# Patient Record
Sex: FEMALE | Race: WHITE | NOT HISPANIC OR LATINO | Employment: FULL TIME | URBAN - METROPOLITAN AREA
[De-identification: names, ages, dates, MRNs, and addresses within clinical notes are randomized per-mention and may not be internally consistent; named-entity substitution may affect disease eponyms.]

---

## 2017-03-07 ENCOUNTER — MYC MEDICAL ADVICE (OUTPATIENT)
Dept: FAMILY MEDICINE | Facility: CLINIC | Age: 52
End: 2017-03-07

## 2017-03-08 NOTE — TELEPHONE ENCOUNTER
Writer notes patient response    Closing encounter - no further actions needed at this time    Shawn Metzger RN

## 2017-03-08 NOTE — TELEPHONE ENCOUNTER
Please give her information on zipnosis (inexpensive about $30) .     I would also be willing to do e-visits if she desires.  Also about $30 usually I believe.     Joel Wegener,MD]

## 2017-03-08 NOTE — TELEPHONE ENCOUNTER
Pt not seen on clinic since 9/9/14. Has had many MyChart encounters since.   See note I sent her and if you have further comments please let us know.  I did tell her she stills needs to be seen once a year at a minimum in the clinic.    Jaylene Joseph, RN, BSN

## 2017-10-22 ENCOUNTER — HEALTH MAINTENANCE LETTER (OUTPATIENT)
Age: 52
End: 2017-10-22

## 2018-10-02 ENCOUNTER — TRANSFERRED RECORDS (OUTPATIENT)
Dept: HEALTH INFORMATION MANAGEMENT | Facility: CLINIC | Age: 53
End: 2018-10-02

## 2018-10-02 ENCOUNTER — SURGERY - HEALTHEAST (OUTPATIENT)
Dept: CARDIOLOGY | Facility: CLINIC | Age: 53
End: 2018-10-02

## 2018-10-02 ASSESSMENT — MIFFLIN-ST. JEOR
SCORE: 1423.25
SCORE: 1423.25

## 2018-10-03 ASSESSMENT — MIFFLIN-ST. JEOR
SCORE: 1422.33
SCORE: 1422.33

## 2018-10-04 ASSESSMENT — MIFFLIN-ST. JEOR
SCORE: 1434.12
SCORE: 1434.12

## 2018-10-05 ENCOUNTER — ANESTHESIA - HEALTHEAST (OUTPATIENT)
Dept: SURGERY | Facility: CLINIC | Age: 53
End: 2018-10-05

## 2018-10-06 ASSESSMENT — MIFFLIN-ST. JEOR
SCORE: 1444.1
SCORE: 1444.1

## 2018-10-07 ASSESSMENT — MIFFLIN-ST. JEOR
SCORE: 1423.69
SCORE: 1423.69

## 2018-10-08 ENCOUNTER — SURGERY - HEALTHEAST (OUTPATIENT)
Dept: SURGERY | Facility: CLINIC | Age: 53
End: 2018-10-08

## 2018-10-08 ASSESSMENT — MIFFLIN-ST. JEOR
SCORE: 1421.42
SCORE: 1421.42

## 2018-10-09 ENCOUNTER — TRANSFERRED RECORDS (OUTPATIENT)
Dept: HEALTH INFORMATION MANAGEMENT | Facility: CLINIC | Age: 53
End: 2018-10-09

## 2018-10-09 ASSESSMENT — MIFFLIN-ST. JEOR
SCORE: 1482.66
SCORE: 1482.66

## 2018-10-10 ASSESSMENT — MIFFLIN-ST. JEOR
SCORE: 1503.98
SCORE: 1503.98

## 2018-10-12 ASSESSMENT — MIFFLIN-ST. JEOR
SCORE: 1435.03
SCORE: 1435.03

## 2018-10-13 ASSESSMENT — MIFFLIN-ST. JEOR
SCORE: 1421.42
SCORE: 1421.42

## 2018-10-14 ASSESSMENT — MIFFLIN-ST. JEOR
SCORE: 1401.92
SCORE: 1401.92

## 2018-10-15 ASSESSMENT — MIFFLIN-ST. JEOR
SCORE: 1401.01
SCORE: 1401.01

## 2018-10-16 ENCOUNTER — COMMUNICATION - HEALTHEAST (OUTPATIENT)
Dept: ADMINISTRATIVE | Facility: CLINIC | Age: 53
End: 2018-10-16

## 2018-10-17 ASSESSMENT — MIFFLIN-ST. JEOR
SCORE: 1378.33
SCORE: 1378.33

## 2018-10-18 ENCOUNTER — HOSPITAL ENCOUNTER (INPATIENT)
Facility: CLINIC | Age: 53
LOS: 7 days | Discharge: HOME | End: 2018-10-25
Attending: PHYSICAL MEDICINE & REHABILITATION | Admitting: PHYSICAL MEDICINE & REHABILITATION
Payer: MEDICAID

## 2018-10-18 DIAGNOSIS — I10 HYPERTENSION GOAL BP (BLOOD PRESSURE) < 140/90: ICD-10-CM

## 2018-10-18 DIAGNOSIS — Z95.1 S/P CABG (CORONARY ARTERY BYPASS GRAFT): ICD-10-CM

## 2018-10-18 DIAGNOSIS — E78.5 HYPERLIPIDEMIA WITH TARGET LDL LESS THAN 100: ICD-10-CM

## 2018-10-18 DIAGNOSIS — K59.00 CONSTIPATION, UNSPECIFIED CONSTIPATION TYPE: ICD-10-CM

## 2018-10-18 DIAGNOSIS — E11.21 TYPE 2 DIABETES MELLITUS WITH DIABETIC NEPHROPATHY, WITHOUT LONG-TERM CURRENT USE OF INSULIN (H): ICD-10-CM

## 2018-10-18 DIAGNOSIS — I63.49 CEREBROVASCULAR ACCIDENT (CVA) DUE TO EMBOLISM OF OTHER CEREBRAL ARTERY (H): Primary | ICD-10-CM

## 2018-10-18 PROBLEM — I63.9 STROKE (H): Status: ACTIVE | Noted: 2018-10-18

## 2018-10-18 LAB
GLUCOSE BLDC GLUCOMTR-MCNC: 252 MG/DL (ref 70–99)
GLUCOSE BLDC GLUCOMTR-MCNC: 292 MG/DL (ref 70–99)

## 2018-10-18 PROCEDURE — 00000146 ZZHCL STATISTIC GLUCOSE BY METER IP

## 2018-10-18 PROCEDURE — 12800006 ZZH R&B REHAB

## 2018-10-18 PROCEDURE — 25000132 ZZH RX MED GY IP 250 OP 250 PS 637: Performed by: PHYSICAL MEDICINE & REHABILITATION

## 2018-10-18 RX ORDER — ACETAMINOPHEN 325 MG/1
325-650 TABLET ORAL EVERY 6 HOURS PRN
Status: DISCONTINUED | OUTPATIENT
Start: 2018-10-18 | End: 2018-10-25 | Stop reason: HOSPADM

## 2018-10-18 RX ORDER — FUROSEMIDE 20 MG
20 TABLET ORAL
Status: DISCONTINUED | OUTPATIENT
Start: 2018-10-18 | End: 2018-10-18

## 2018-10-18 RX ORDER — POTASSIUM CHLORIDE 750 MG/1
10 TABLET, EXTENDED RELEASE ORAL 2 TIMES DAILY
Status: DISCONTINUED | OUTPATIENT
Start: 2018-10-18 | End: 2018-10-18

## 2018-10-18 RX ORDER — POLYETHYLENE GLYCOL 3350 17 G/17G
17 POWDER, FOR SOLUTION ORAL DAILY
Status: DISCONTINUED | OUTPATIENT
Start: 2018-10-18 | End: 2018-10-18

## 2018-10-18 RX ORDER — LOSARTAN POTASSIUM 100 MG/1
100 TABLET ORAL DAILY
Status: DISCONTINUED | OUTPATIENT
Start: 2018-10-19 | End: 2018-10-25 | Stop reason: HOSPADM

## 2018-10-18 RX ORDER — HYDRALAZINE HYDROCHLORIDE 10 MG/1
10 TABLET, FILM COATED ORAL EVERY 6 HOURS PRN
Status: DISCONTINUED | OUTPATIENT
Start: 2018-10-18 | End: 2018-10-25 | Stop reason: HOSPADM

## 2018-10-18 RX ORDER — IPRATROPIUM BROMIDE AND ALBUTEROL SULFATE 2.5; .5 MG/3ML; MG/3ML
3 SOLUTION RESPIRATORY (INHALATION) EVERY 4 HOURS PRN
Status: DISCONTINUED | OUTPATIENT
Start: 2018-10-18 | End: 2018-10-25 | Stop reason: HOSPADM

## 2018-10-18 RX ORDER — FUROSEMIDE 20 MG
20 TABLET ORAL
Status: DISCONTINUED | OUTPATIENT
Start: 2018-10-18 | End: 2018-10-20

## 2018-10-18 RX ORDER — ASPIRIN 81 MG/1
81 TABLET, CHEWABLE ORAL DAILY
Status: DISCONTINUED | OUTPATIENT
Start: 2018-10-19 | End: 2018-10-25 | Stop reason: HOSPADM

## 2018-10-18 RX ORDER — POLYETHYLENE GLYCOL 3350 17 G/17G
17 POWDER, FOR SOLUTION ORAL DAILY PRN
Status: DISCONTINUED | OUTPATIENT
Start: 2018-10-18 | End: 2018-10-25 | Stop reason: HOSPADM

## 2018-10-18 RX ORDER — ONDANSETRON 4 MG/1
4 TABLET, FILM COATED ORAL EVERY 6 HOURS PRN
Status: DISCONTINUED | OUTPATIENT
Start: 2018-10-18 | End: 2018-10-25 | Stop reason: HOSPADM

## 2018-10-18 RX ORDER — BISACODYL 10 MG
10 SUPPOSITORY, RECTAL RECTAL DAILY PRN
Status: DISCONTINUED | OUTPATIENT
Start: 2018-10-18 | End: 2018-10-25 | Stop reason: HOSPADM

## 2018-10-18 RX ORDER — DOCUSATE SODIUM 100 MG/1
100 CAPSULE, LIQUID FILLED ORAL 2 TIMES DAILY PRN
Status: DISCONTINUED | OUTPATIENT
Start: 2018-10-18 | End: 2018-10-25 | Stop reason: HOSPADM

## 2018-10-18 RX ORDER — WARFARIN SODIUM 3 MG/1
6 TABLET ORAL
Status: COMPLETED | OUTPATIENT
Start: 2018-10-18 | End: 2018-10-18

## 2018-10-18 RX ORDER — POTASSIUM CHLORIDE 750 MG/1
10 TABLET, EXTENDED RELEASE ORAL 2 TIMES DAILY
Status: DISCONTINUED | OUTPATIENT
Start: 2018-10-18 | End: 2018-10-20

## 2018-10-18 RX ORDER — ATORVASTATIN CALCIUM 80 MG/1
80 TABLET, FILM COATED ORAL AT BEDTIME
Status: DISCONTINUED | OUTPATIENT
Start: 2018-10-18 | End: 2018-10-25 | Stop reason: HOSPADM

## 2018-10-18 RX ORDER — SENNOSIDES 8.6 MG
2 TABLET ORAL 2 TIMES DAILY PRN
Status: DISCONTINUED | OUTPATIENT
Start: 2018-10-18 | End: 2018-10-25 | Stop reason: HOSPADM

## 2018-10-18 RX ORDER — AMLODIPINE BESYLATE 10 MG/1
10 TABLET ORAL DAILY
Status: DISCONTINUED | OUTPATIENT
Start: 2018-10-19 | End: 2018-10-25 | Stop reason: HOSPADM

## 2018-10-18 RX ADMIN — POTASSIUM CHLORIDE 10 MEQ: 750 TABLET, EXTENDED RELEASE ORAL at 21:44

## 2018-10-18 RX ADMIN — WARFARIN SODIUM 6 MG: 3 TABLET ORAL at 18:07

## 2018-10-18 RX ADMIN — FUROSEMIDE 20 MG: 20 TABLET ORAL at 16:37

## 2018-10-18 RX ADMIN — ATORVASTATIN CALCIUM 80 MG: 80 TABLET, FILM COATED ORAL at 21:44

## 2018-10-18 RX ADMIN — METOPROLOL TARTRATE 125 MG: 50 TABLET, FILM COATED ORAL at 21:44

## 2018-10-18 ASSESSMENT — MIFFLIN-ST. JEOR
SCORE: 1370.62
SCORE: 1370.62

## 2018-10-18 NOTE — IP AVS SNAPSHOT
[facility]    [address] [place] [id number]    Phone:  [phone]                                       After Visit Summary   10/18/2018    Christina Sanchez    MRN: [id number]           After Visit Summary Signature Page     I have received my discharge instructions, and my questions have been answered. I have discussed any challenges I see with this plan with the nurse or doctor.    ..........................................................................................................................................  Patient/Patient Representative Signature      ..........................................................................................................................................  Patient Representative Print Name and Relationship to Patient    ..................................................               ................................................  Date                                   Time    ..........................................................................................................................................  Reviewed by Signature/Title    ...................................................              ..............................................  Date                                               Time          22EPIC Rev 08/18

## 2018-10-18 NOTE — H&P
"    [facility]   Acute Rehabilitation Unit  Admission History and Physical    chief complaint   \"Not myself but getting better\"    REHAB DIAGNOSIS  Bilateral embolic strokes    History of Present illness  Christina Sanchez is a 53 year old right hand dominant woman with a PMH including but not limited to HTN, uncontrolled diabetes (HbA1c 9.1), CAD, CHF, bilateral foot drop (pt states due to Atenolol, prior notes state could be due to medication side effect vs. Viral infection sequelae), and medication non-compliance who presented to [facility] on 10/1/18 for shortness of breath, chest pain, productive cough, and nausea.  She was diagnosed with a CHF exacerbation and hypertensive emergency, as BP was 221/95.  Treatment was initiated, and was also started on Zosyn for bacteremia and UTI.  Troponins were then found to be elevated and uptrending with subsequent EKG changes, and she was then transferred to [facility] for further evaluation.  At [facility], a LHC was performed which showed severe 2 vessel disease and she subsequently underwent a CABG x3 on 10/8 by Dr. Whitney.  Intra-operatively she required pressor support and transfusion of 3 units PRBCs.  Post-operative developed SILVERIO and hyponatremia for which nephrology was consulted and felt was due to diabetic nephropathy and fluid overload, and responded well to diuretics.  On POD 5 she complained of left arm numbness and other non-specific neurological symptoms, and an MRI showed multiple bilateral cerebral embolic infarctions.  She was started on ASA and Coumadin for this, with Plavix discontinued.  Workup included a CTA of the head and neck which showed occlusion of the left PCA and 50-70% stenosis of the proximal R ICA, and an echo showed an EF of 65-70% without vegetations.  Other notable events include surgical consultation for a large umbilical hernia, which will be re-evaluated in 1-2 months.  Of note, " the patient is very particular about diabetes medications including refusal of insulin and Metformin due to prior adverse effects, and also blames her current deficits on insulin she received during her hospitalization.  She says she is very brittle, and sensitive to medications in general.  She is currently on Januvia only.  She also complained of L arm numbness, and an MRI C-spine showed moderate to severe spinal canal and left foraminal narrowing at C5-6.  Outpatient EMG was recommended.    Currently she feels like she is at her baseline cognitive status since insulin was discontinued.  She denies pain or trouble swallowing.  Has baseline decreased vision and was worse a few days ago, but now back to baseline.  No problems with bowel or bladder and appetite is good.  Per nursing staff has expressed feelings of depression and would like to see health psychology.      FUNCTIONAL HISTORY  Current Functional Status:  Transfers  Bed Mobility: Min A, Assist of 1  Bed to Chair: CGA, Assist of 1  Chair to Stand: CGA, Assist of 1  Transfer Comments: Needs reminders to follow sternal precautions    Walking/Marching  Distance: 200 ft  Met Level: 3  Peak HR: 90  Peak BP: 148/70  Ambulation Status: CGA, Assist of 1  Device: Walker  Comment: bilateral foot drop    Prior Functional Status:  Ambulates with a Rolling walker.  Has bilateral AFOs, but does not use them.  Independent with all ADLs and most IADLs, but does not drive due to her foot drop.     PAST Medical History   Reviewed and updated in Epic.  Past Medical History:   Diagnosis Date     Idiopathic progressive polyneuropathy 8/14/2014       Surgical History  Reviewed and updated in Epic.  No past surgical history on file.    SOCIAL HISTORY  Reviewed and updated in Epic.  Marital Status:   Living situation: Lives in a house with 2 KJ.  All needs met on the main level.  Has a basement, but she does not go down there.  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     Social History Main Topics     Smoking status: Never Smoker     Smokeless tobacco: Never Used     Alcohol use No     Drug use: No     Sexual activity: Not on file     Other Topics Concern     Parent/Sibling W/ Cabg, Mi Or Angioplasty Before 65f 55m? Yes     Social History Narrative       FAMILY HISTORY  Reviewed and updated in Epic.  Family History   Problem Relation Age of Onset     Neurologic Disorder No family hx of            Medications  Scheduled meds  Prescriptions Prior to Admission   Medication Sig Dispense Refill Last Dose     ACETAMINOPHEN PO Take 650 mg by mouth every 6 hours as needed for pain   Not Taking     azithromycin (ZITHROMAX) 250 MG tablet Two tablets first day, then one tablet daily for four days. 6 tablet 0      Blood Glucose Monitoring Suppl (BLOOD GLUCOSE METER) KIT 1 kit 6 times daily Meter covered by insurance 1 kit 1 Not Taking     glucagon 1 MG injection Inject 1 mg into the muscle once for 1 dose 1 mg 0      Glucose Blood (BLOOD GLUCOSE TEST STRIPS) STRP Strips that go with meter covered by insurance test 6-8 times dailiy 200 strip 11 Not Taking     LANCETS ULTRA THIN 30G MISC Lancets that go with meter covered by insurance test 6-8 times daily 200 each 3 Not Taking     ORDER FOR DME Equipment being ordered: Walker with four wheels, brakes and seat with basket. 1 each 0        ALLERGIES     Allergies   Allergen Reactions     Shrimp Anaphylaxis     Atenolol Other (See Comments)     Loss of muscle and ability to walk     Dust Mites      Mold      Other [Seasonal Allergies]      Pollens and Flowers     Perfume      Peanuts [Nuts] GI Disturbance         Review of Systems  A 10 point ROS was performed and negative unless otherwise noted in  "HPI.     Constitutional: Negative for fevers, chills  Eyes: +Decreased vision  Ears, Nose, Throat: Negative  Cardiovascular: +dyspnea, CAD, s/p CABG  Respiratory: +BERMUDEZ  Gastrointestinal: Negative for constipation or diarrhea  Genitourinary: Negative for urinary frequency or incontinence  Musculoskeletal: +bilateral foot drop  Neurologic: +embolic strokes  Psychiatric: Depressed mood      Physical Exam  VITAL SIGNS:  /63 (BP Location: Left arm)  Pulse 70  Temp 97.5  F (36.4  C) (Oral)  Resp 14  Ht 1.575 m (5' 2\")  Wt 82.6 kg (182 lb 3.2 oz)  SpO2 96%  BMI 33.32 kg/m2  BMI:  Estimated body mass index is 28.35 kg/(m^2) as calculated from the following:    Height as of 8/21/14: 1.575 m (5' 2\").    Weight as of 9/9/14: 70.3 kg (155 lb).     General: NAD  HEENT: NC/AT  Pulmonary: Non-labored breathing, CTA b/l, no w/r/r  Cardiovascular: RRR, S1+S2, no m/r/g  Abdominal: Soft, NT/ND, BS+  Lower Extremities: No LE edema or calf tenderness b/l  MSK/neuro:   Mental Status:  alert and oriented x3   Cranial Nerves: grossly normal   1. 2nd CN: Pupils equal, round, reactive to light and accomodation. and visual fields intact to confrontation.   2. 3rd,4th,6th CN:  EOMI, appropriate pupillary responses  3. 5th CN: facial sensation intact   4. 7th CN: face symmetrical   5. 8th CN: functional hearing bilaterally  6. 9th, 10th CN: palate elevates symmetrically   7. 11th CN: sternocleidomastoids and trapezii strong   8. 12th CN: tongue midline and without fasciculations    Sensory: Normal to light touch in bilateral upper and lower extremities except the ulnar aspect of left hand and forearm which is decreased.   Strength:   Shoulder abd  EF  WE  EE    Finger abd  R NT 5 5 5 5 5  L NT 5 5 5 5 4     HF  KE  DF  EHL  PF   R  5 5 1 1 5  L  5  5 1 1 5     Reflexes:    Biceps BR Triceps Patella Achilles  R 2+ 2+ 2+ 2+ 0  L 2+ 2+ 2+ 2+ 0     Avalos's test: negative bilaterally    Coordination: No dysmetria on finger to nose " b/l     Cognition:   Orientation: AAOx3  President: Correctly named current and past president  WORLD <--> DLROW - Correctly spelled without difficulty  Memory: 1/3 with and without cues    Speech: Fluent  Comprehension: In tact  Repetition: In tact  -->: No aphasia    No evidence of neglect with finger extinguishing      Labs  BMP (10/17/18): Na 136, K 4, Cl 97, CO2 28, glu 230, BUN 22, Cr 1.00, Ca 8.6  Mg 2.0  CBC (10/17): Hgb 8.9, plts 342  INR (10/17): 1.19  Lipid panel (10/2/18): , Chol 197, , HDL 31     Imaging  CTA Head/Neck (10/15/18)  HEAD CT:  1.  Transcortical hypoattenuation in the right parietotemporal and left occipital temporal lobes as well as small foci of hypoattenuation in the bilateral frontoparietal cerebral white matter, which corresponded the acute infarction seen on the recent   MRI. No hemorrhagic transformation. No significant mass effect. No interval new acute large artery territory infarction. The multi-territorial distribution of infarction is suggestive of a central embolic source    HEAD CTA:   1.  Abrupt occlusion of the left V3 segment posterior cerebral artery.  2.  Mild narrowing of the bilateral supraclinoid internal carotid arteries and left cavernous internal carotid artery, likely due to atherosclerosis.    NECK CTA:  1.  Atherosclerosis causes approximately 50-70% stenosis in the proximal right internal carotid artery and less than 40% stenosis in the proximal left internal carotid artery    2.  Postoperative appearance prior CABG. Small left and trace right pleural effusions.    MRI C-Spine (10/14/18)  CONCLUSION:  1.  Moderate to severe spinal canal narrowing with flattening of the ventral cord and narrowing of the left lateral recess at C5-C6. No convincing associated cord edema.  2.  Mild spinal canal narrowing at C4-C5 and C6-C7.  3.  Moderate to severe left and mild to moderate right neuroforaminal narrowing at C5-C6.  4.  Moderate right and mild left  neuroforaminal narrowing at C4-C5.    MRI Brain (10/14/18)  CONCLUSION:  1.  There are several areas of acute to subacute ischemia involving both cerebral hemispheres as detailed above. The largest areas involve the right posterior temporal lobe and right occipital lobe, and the posteromedial left temporal lobe and occipital   lobe. Other areas of acute to subacute ischemia involve the medial left occipital lobe, and the bilateral frontoparietal corona radiata as well as couple of punctate foci of cortical acute to subacute ischemia in the posterior left frontal lobe and left   parietal lobe.  2.  Given the multiple vascular distributions, embolic phenomena is suspected.   3.  There are several small areas of patchy enhancement intracranially as detailed above. These are nonspecific but are favored to represent subacute ischemia. Recommend follow-up MRI in 6-8 weeks to document expected evolution.    IMPRESSION  Christina Sanchez is a 53 year old RHD womanwith a PMH including but not limited to HTN, uncontrolled diabetes (HbA1c 9.1), CAD, CHF, bilateral foot drop, and medication non-compliance who presented to [facility] on 10/1/18 for shortness of breath, chest pain, productive cough, and nausea, and found to be in CHF exacerbation and hypertensive emergency.  Hospital course was complicated by UTI and bacteremia, and found NSTEMI requiring transfer to [facility] for further treatment and workup.  Found to have 2 vessel disease, s/p CABG which was complicated by embolic CVA.  Now on Aspirin and coumadin.       Impairment group code: 01.4 No Paresis, bilateral hemispheric strokes following CABG x3    PLAN  1)Rehabiliation  -The patient has impairments in balance, sensation, coordination, and endurance, which are leading to activity limitations in ambulation, mobility, and ADLs.  She will be admitted into a comprehensive, interdisciplinary, inpatient rehabilitation program including  PT and OT.  Will ask speech  pathology to also evaluate the patient given stroke and memory deficits seen on exam.  She will receive 60 minutes of each on a daily basis.  The patient requires close supervision by a physiatrist for management and monitoring of active and chronic medical co-morbidities, and to ensure the patient's ability to fully participate and benefit from the rigorous program.  Additionally, the patient requires specialized rehabilitation nursing for monitoring of vital signs, medication administration, patient training and education, monitoring of bowel and bladder, and wound care.  The assistance of the social work team is also needed to collaborate and achieve the identified discharge goals.   A comprehensive individualized Care Plan will be formulated by the attending Physiatrist after initial evaluations by each Rehabilitation Team member at the initial team conference within four days of admission and updated/modified at subsequent team conferences.   This level of care and multidisciplinary approach is medically necessary and only available at the inpatient rehabilitation facility level of care.  The patient is willing to participate in 3 hours of therapy per day and has the potential for improvement.       2)Neurology  -Bilateral multiple hemispheric CVAs due to embolism in the setting of CABG   -MRI in 6-8 weeks to follow up evolution of strokes   -Follow up with neurology after discharge   -ASA 81 mg and Coumadin (goal 2-3) for secondary stroke prevention.  In addition will provide ongoing education regarding risk factor modification including controlling HTN and DM, medication compliance, lifestyle and diet changes, exercise, and physician follow up  -L arm numbness   -EMG as an outpatient.  MRI shows stenosis at the C5-6 level, however her symptoms are in the ulnar or C8/T1 distribution    3)Cardiology  -CAD s/p NSTEMI and CABG x3   -ASA 81 mg daily   -Sternal precautions 3 months, no lifting >10 lbs for 6-8  weeks   -May wash incision with soap   -Follow up with CV surgery (Olga Lidia Mcclendon NP, appointment on 11?/3)  -HTN/CHF   -Norvasc 10 mg daily, Cozaar 100 mg daily, Metoprolol 125 mg BID   -Lasix 20 mg BID with potassium supplementation x4 days.  Will monitor daily weights and re-evaluate need at that time.   -Hydralazine PRN for SBP >160 mmHg  -HLD: Lipitor 80 mg daily  -Follow up with cardiology made on December 6 with Dr. Cantrell    4)Pulm  -Acute respiratory failure post op due to volume overload, now off supplemental oxygen  -Chest tubes removed 10/5  -Duonebs q4h PRN  -Encourage incentive spirometry    5)FENGI  -Diet: Diabetic/Cardiac, regular consistency solids and thin liquids  -Monitor sodium and potassium levels  -Bowel meds PRN  -Zofran PRN  -Prilosec daily for GI prophylaxis and GERD    6)  -PVRs x3, IC if >250 ml  -Prior UTI and bacteremia, now completed treatment  -Prior acute on chronic kidney injury, now resolved.  Monitor kidney function.    7)DVT prophylaxis  -Coumadin, pharmacy to assist with INR monitoring (goal 2-3) and dosing    8)Pain  -Has not complained of pain, will order Tylenol PRN    9)Endocrine  -Uncontrolled DM  -Patient started on Januvia 50 mg daily, however has refused insulin and Metformin and insists this is the etiology of her symptoms.  Check glucose levels qAC and at bedtime, and if continues to be elevated will need Endocrine assistance for medication optimization.  Per report, had previously followed with an endocrinologist but has not seen one in several years    10)ID  -Prior bacteremia and UTI, now completed treatment  -Monitor for signs of infection    11)Psych  -Has expressed depression and difficulty coping.  Will ask health psychology to evaluate.    12)Heme  -Acute blood loss anemia post-operatively, Hgb 8.9 on last check.  Overall stable.  -No evidence of active bleeding, monitor peripherally    13)MSK  -Umbilical hernia: Needs follow up with general surgery (  [facility]) for repair in 1-2 months    14)Dispo:  -Anticipate discharge home  -Goals: Modified independent for ambulation with walker, and Mod I for all ADLs  -Rehab Prognosis: Good  -ELOS: 7 days    Code status: Full (discussed with patient)    Shan Merlos MD  Department of Rehabilitation Medicine  Pager: [phone]    Time Spent on this Encounter   I, Shan Merlos, spent a total of 75 minutes bedside and on the inpatient unit today managing the care of Christina Sanchez.  Over 50% of my time on the unit was spent counseling the patient and /or coordinating care. See note for details.

## 2018-10-18 NOTE — PHARMACY-ANTICOAGULATION SERVICE
Clinical Pharmacy - Warfarin Dosing Consult     Pharmacy has been consulted to manage this patient s warfarin therapy.  Indication: Other - specify in comments (CVA, s/p CABG)  Therapy Goal: INR 2-3  Warfarin Prior to Admission: Yes  Warfarin PTA Regimen: 10/15 5 mg, 10/16 5 mg, 10/17 6 mg while in [facility]  Significant drug interactions: Aspirin, atrovastatin    INR on 10/18 =1.42 per [facility]    Recommend warfarin 6 mg today.  Pharmacy will monitor Christina Sanchez daily and order warfarin doses to achieve specified goal.      Please contact pharmacy as soon as possible if the warfarin needs to be held for a procedure or if the warfarin goals change.

## 2018-10-18 NOTE — H&P
Post Admission Physician Evaluation:    I have compared Christina Sanchez's condition on admission to acute rehabilitation to that outlined in the preadmission screen. History and physical exam performed by me.  No significant differences are identified and the patient remains appropriate for an inpatient rehabilitation facility level of care to manage medical issues and address functional impairments due to bilateral embolic CVAs in the setting of CAD s/p CABG x3.    Comorbid medical conditions being managed: HTN, anticoagulation, HLD, diabetes mellitus, HLD, GERD, CHF/volume overload, hypokalemia, hyponatremia, SILVERIO, CKD    Current Functional Status:  Transfers  Bed Mobility: Min A, Assist of 1  Bed to Chair: CGA, Assist of 1  Chair to Stand: CGA, Assist of 1  Transfer Comments: Needs reminders to follow sternal precautions    Walking/Marching  Distance: 200 ft  Met Level: 3  Peak HR: 90  Peak BP: 148/70  Ambulation Status: CGA, Assist of 1  Device: Walker  Comment: bilateral foot drop     Prior Functional Status:  Ambulates with a Rolling walker.  Has bilateral AFOs, but does not use them.  Independent with all ADLs and most IADLs, but does not drive due to her foot drop.        Anticipated rehabilitation course: Anticipate will be able to discharge home with family.  Goals for modified independent for ambulation and all ADLs using a rolling walker.      Will benefit from intensive rehabilitation includin minutes each of PT, OT and SLP  Rehabilitation nursing  Close management by physiatry    Prognosis: Good    Estimated length of stay: 7 days    Shan Merlos MD  Department of Rehabilitation Medicine  Pager: [phone]

## 2018-10-18 NOTE — PROGRESS NOTES
Patient arrived to unit at approx 13:00 in private vehicle with significant other.  Writer and 2nd staff assisted patient out of back seat using gait belt and sternal precautions.  Patient sat in wheelchair and was wheeled onto unit into her room.  Patient denies pain.  Is continent of bowel and bladder per her report.  Declined flu and pneumonia vaccines.  Patient ordered on own lunch and ate 100%.  Will self select and order her own meals.  Patient has own rolling walker from home in room.  Was educated on importance of calling for help for any transfers or ambulation. Was able to use call light.  Patient was provided a chair in her room and she is sitting up in chair at this time visiting with significant other.  Patient agreed with all allergies listed and added Atenolol and writer updated in Epic.    Patient verbalized to writer her father passed a couple months ago and she also lost her job and was tearful while talking with writer about it.  MD notified.

## 2018-10-18 NOTE — PROGRESS NOTES
"   10/18/18 1500   Values Beliefs and Spiritual Care   C: Community: In support of your spiritual health, is there someone we may contact for you? (identify all that apply) (SHS/10-18)   Visit Information   Visit Made By Staff    Type of Visit Initial   Visited Patient;Family   Interventions   Plan of Care Review   With patient/family/proxy   Basic Spiritual Interventions    introduction/orientation to Spiritual Health Services;Assessment of spiritual needs/resources;Reflective conversation;Life Review   Advanced Assessments/Interventions   Presenting Concerns/Issues Spiritual/Mormonism/emotional support;Grief and adjustment issues;Challenged coping;Stress/self-care   SPIRITUAL HEALTH SERVICES   Spiritual Assessment Progress Note   [facility] ([facility] 5R   REFERRAL SOURCE: New on unit.   On this visit, introduced SH to Christina and her long time partner, Av.     EXPERIENCE OF ILLNESS/HOSPITALIZATION: Christina explained that she'd had a heart condition for a long time that she ignored. Then she had a heart attack, heart surgery and during the surgery she had a couple of small strokes.   MEANING-MAKING: Christina named as her primary spiritual pain right now is the recent loss of her father, who fell, hurt himself badly and  after 31 days in ICU at [facility].  Also, Av's dad is declining badly just now.  Christina became teary as she named these things.   SPIRITUALITY/VALUES/Presybeterian: Christina said that she is Mormon, but no Congregation. Av said that he is Zoroastrianism.  COPING/SPIRITUAL PRACTICES: n/d  Dr. Merlos came to welcome pt and we ended our visit.   SUPPORT SYSTEMS: Christina and Av have been together for fifteen years. \"We haven't gotten around to getting \", said Av. Christina said, \"He's the love of my life.\" Christina lives with Av, her mom, her daughter (32) and her granddtr (11)/  PLAN: Will see Christina next week on ARU.    Rev. Joshua-O Beatris-Cristina  Staff   Spiritual Health Services  Pager: " [phone]  Office: [phone]  * [facility] remains available 24/7 for emergent requests/referrals, either by having the switchboard page the on-call  or by entering an ASAP/STAT consult in Epic (this will also page the on-call ).*

## 2018-10-19 LAB
ANION GAP SERPL CALCULATED.3IONS-SCNC: 8 MMOL/L (ref 3–14)
BASOPHILS # BLD AUTO: 0.1 10E9/L (ref 0–0.2)
BASOPHILS NFR BLD AUTO: 0.9 %
BUN SERPL-MCNC: 28 MG/DL (ref 7–30)
CALCIUM SERPL-MCNC: 8.5 MG/DL (ref 8.5–10.1)
CHLORIDE SERPL-SCNC: 99 MMOL/L (ref 94–109)
CO2 SERPL-SCNC: 28 MMOL/L (ref 20–32)
CREAT SERPL-MCNC: 1.07 MG/DL (ref 0.52–1.04)
DIFFERENTIAL METHOD BLD: ABNORMAL
EOSINOPHIL # BLD AUTO: 0.7 10E9/L (ref 0–0.7)
EOSINOPHIL NFR BLD AUTO: 9.1 %
ERYTHROCYTE [DISTWIDTH] IN BLOOD BY AUTOMATED COUNT: 15.4 % (ref 10–15)
GFR SERPL CREATININE-BSD FRML MDRD: 54 ML/MIN/1.7M2
GLUCOSE BLDC GLUCOMTR-MCNC: 225 MG/DL (ref 70–99)
GLUCOSE BLDC GLUCOMTR-MCNC: 232 MG/DL (ref 70–99)
GLUCOSE BLDC GLUCOMTR-MCNC: 239 MG/DL (ref 70–99)
GLUCOSE SERPL-MCNC: 263 MG/DL (ref 70–99)
HCT VFR BLD AUTO: 30.4 % (ref 35–47)
HGB BLD-MCNC: 9.6 G/DL (ref 11.7–15.7)
IMM GRANULOCYTES # BLD: 0 10E9/L (ref 0–0.4)
IMM GRANULOCYTES NFR BLD: 0.4 %
INR PPP: 1.63 (ref 0.86–1.14)
LYMPHOCYTES # BLD AUTO: 2 10E9/L (ref 0.8–5.3)
LYMPHOCYTES NFR BLD AUTO: 24.3 %
MCH RBC QN AUTO: 29.4 PG (ref 26.5–33)
MCHC RBC AUTO-ENTMCNC: 31.6 G/DL (ref 31.5–36.5)
MCV RBC AUTO: 93 FL (ref 78–100)
MONOCYTES # BLD AUTO: 0.4 10E9/L (ref 0–1.3)
MONOCYTES NFR BLD AUTO: 5.3 %
NEUTROPHILS # BLD AUTO: 4.9 10E9/L (ref 1.6–8.3)
NEUTROPHILS NFR BLD AUTO: 60 %
NRBC # BLD AUTO: 0 10*3/UL
NRBC BLD AUTO-RTO: 0 /100
PLATELET # BLD AUTO: 333 10E9/L (ref 150–450)
POTASSIUM SERPL-SCNC: 4.1 MMOL/L (ref 3.4–5.3)
RBC # BLD AUTO: 3.27 10E12/L (ref 3.8–5.2)
SODIUM SERPL-SCNC: 135 MMOL/L (ref 133–144)
WBC # BLD AUTO: 8.1 10E9/L (ref 4–11)

## 2018-10-19 PROCEDURE — 97167 OT EVAL HIGH COMPLEX 60 MIN: CPT | Mod: GO | Performed by: OCCUPATIONAL THERAPIST

## 2018-10-19 PROCEDURE — 97112 NEUROMUSCULAR REEDUCATION: CPT | Mod: GP

## 2018-10-19 PROCEDURE — 97162 PT EVAL MOD COMPLEX 30 MIN: CPT | Mod: GP

## 2018-10-19 PROCEDURE — 97530 THERAPEUTIC ACTIVITIES: CPT | Mod: GO | Performed by: OCCUPATIONAL THERAPIST

## 2018-10-19 PROCEDURE — 97110 THERAPEUTIC EXERCISES: CPT | Mod: GO | Performed by: OCCUPATIONAL THERAPIST

## 2018-10-19 PROCEDURE — 85610 PROTHROMBIN TIME: CPT | Performed by: PHYSICAL MEDICINE & REHABILITATION

## 2018-10-19 PROCEDURE — 97530 THERAPEUTIC ACTIVITIES: CPT | Mod: GP

## 2018-10-19 PROCEDURE — G0463 HOSPITAL OUTPT CLINIC VISIT: HCPCS

## 2018-10-19 PROCEDURE — 97535 SELF CARE MNGMENT TRAINING: CPT | Mod: GO | Performed by: OCCUPATIONAL THERAPIST

## 2018-10-19 PROCEDURE — 40000133 ZZH STATISTIC OT WARD VISIT: Performed by: OCCUPATIONAL THERAPIST

## 2018-10-19 PROCEDURE — 80048 BASIC METABOLIC PNL TOTAL CA: CPT | Performed by: PHYSICAL MEDICINE & REHABILITATION

## 2018-10-19 PROCEDURE — 36415 COLL VENOUS BLD VENIPUNCTURE: CPT | Performed by: PHYSICAL MEDICINE & REHABILITATION

## 2018-10-19 PROCEDURE — 25000132 ZZH RX MED GY IP 250 OP 250 PS 637: Performed by: PHYSICAL MEDICINE & REHABILITATION

## 2018-10-19 PROCEDURE — 00000146 ZZHCL STATISTIC GLUCOSE BY METER IP

## 2018-10-19 PROCEDURE — 40000193 ZZH STATISTIC PT WARD VISIT

## 2018-10-19 PROCEDURE — 85025 COMPLETE CBC W/AUTO DIFF WBC: CPT | Performed by: PHYSICAL MEDICINE & REHABILITATION

## 2018-10-19 PROCEDURE — 12800006 ZZH R&B REHAB

## 2018-10-19 PROCEDURE — 40000225 ZZH STATISTIC SLP WARD VISIT

## 2018-10-19 PROCEDURE — 92523 SPEECH SOUND LANG COMPREHEN: CPT | Mod: GN

## 2018-10-19 RX ORDER — WARFARIN SODIUM 3 MG/1
6 TABLET ORAL
Status: COMPLETED | OUTPATIENT
Start: 2018-10-19 | End: 2018-10-19

## 2018-10-19 RX ADMIN — ATORVASTATIN CALCIUM 80 MG: 80 TABLET, FILM COATED ORAL at 20:42

## 2018-10-19 RX ADMIN — SITAGLIPTIN 50 MG: 50 TABLET, FILM COATED ORAL at 08:14

## 2018-10-19 RX ADMIN — LOSARTAN POTASSIUM 100 MG: 100 TABLET, FILM COATED ORAL at 08:14

## 2018-10-19 RX ADMIN — ASPIRIN 81 MG CHEWABLE TABLET 81 MG: 81 TABLET CHEWABLE at 08:16

## 2018-10-19 RX ADMIN — POTASSIUM CHLORIDE 10 MEQ: 750 TABLET, EXTENDED RELEASE ORAL at 08:13

## 2018-10-19 RX ADMIN — POTASSIUM CHLORIDE 10 MEQ: 750 TABLET, EXTENDED RELEASE ORAL at 20:43

## 2018-10-19 RX ADMIN — FUROSEMIDE 20 MG: 20 TABLET ORAL at 16:33

## 2018-10-19 RX ADMIN — METOPROLOL TARTRATE 125 MG: 50 TABLET, FILM COATED ORAL at 20:42

## 2018-10-19 RX ADMIN — METOPROLOL TARTRATE 125 MG: 50 TABLET, FILM COATED ORAL at 08:14

## 2018-10-19 RX ADMIN — WARFARIN SODIUM 6 MG: 3 TABLET ORAL at 18:11

## 2018-10-19 RX ADMIN — AMLODIPINE BESYLATE 10 MG: 10 TABLET ORAL at 08:13

## 2018-10-19 RX ADMIN — HYDRALAZINE HYDROCHLORIDE 10 MG: 10 TABLET, FILM COATED ORAL at 01:10

## 2018-10-19 RX ADMIN — FUROSEMIDE 20 MG: 20 TABLET ORAL at 08:13

## 2018-10-19 ASSESSMENT — ACTIVITIES OF DAILY LIVING (ADL): PREVIOUS_RESPONSIBILITIES: MEAL PREP;HOUSEKEEPING;LAUNDRY;SHOPPING;MEDICATION MANAGEMENT;FINANCES;WORK

## 2018-10-19 NOTE — PROGRESS NOTES
10/19/18 0800   Quick Adds   Type of Visit Initial Occupational Therapy Evaluation   Living Environment   Lives With spouse;grandchild(praveen);child(praveen), adult;other (see comments)   Living Arrangements house   Home Accessibility stairs to enter home;grab bars present (bathtub)   Number of Stairs to Enter Home 2   Number of Stairs Within Home 0  (Stairs to basement but does not need to access)   Stair Railings at Home outside, present at both sides   Transportation Available family or friend will provide;car   Living Environment Comment OT: Pt lives at home with spouse, mom, daughter, and grand daughter.  2 KJ and all living spaces on main level. Bathrom set up: standard toilet, tub shower with grab bars no chair. Bedroom: Standard bed height, no rails or moveable features. Spouse working during the day but mom home during day. Pt reports family is very supportive and able to provide assist.   Self-Care   Dominant Hand right   Usual Activity Tolerance moderate   Current Activity Tolerance fair   Regular Exercise no   Equipment Currently Used at Home walker, rolling;grab bar   Activity/Exercise/Self-Care Comment OT: Pt works full time selling advertising on phone. Pt reports being active at home and work. Pt owns 4ww (present on the unit), cane, and bilateral AFOs. Pt reports not using any of the equipment.     Functional Level Prior   Ambulation 0-->independent   Transferring 0-->independent   Toileting 0-->independent   Bathing 0-->independent   Dressing 0-->independent   Eating 0-->independent   Communication 0-->understands/communicates without difficulty   Swallowing 0-->swallows foods/liquids without difficulty   Cognition 0 - no cognition issues reported   Which of the above functional risks had a recent onset or change? transferring;ambulation;toileting;bathing;dressing   Prior Functional Level Comment OT: Pt was IND transfers/mobility, IND ADLs, and shared responsibility with family for IADLs.        Present no   Language English   General Information   Onset of Illness/Injury or Date of Surgery - Date 10/01/18   Referring Physician Shan Merlos MD   Patient/Family Goals Statement Pt goal to get stronger and return home as soon as possible   Additional Occupational Profile Info/Pertinent History of Current Problem Per medical charting Christina Sanchez is a 53 year old RHD womanwith a PMH including but not limited to HTN, uncontrolled diabetes (HbA1c 9.1), CAD, CHF, bilateral foot drop, and medication non-compliance who presented to [facility] on 10/1/18 for shortness of breath, chest pain, productive cough, and nausea, and found to be in CHF exacerbation and hypertensive emergency.  Hospital course was complicated by UTI and bacteremia, and found NSTEMI requiring transfer to [facility] for further treatment and workup.  Found to have 2 vessel disease, s/p CABG which was complicated by embolic CVA.   Precautions/Limitations fall precautions   Weight-Bearing Status - LUE full weight-bearing   Weight-Bearing Status - RUE full weight-bearing   Weight-Bearing Status - LLE full weight-bearing   Weight-Bearing Status - RLE full weight-bearing   Heart Disease Risk Factors Medical history;Diabetes  (Medical non compliance)   Cognitive Status Examination   Orientation orientation to person, place and time   Level of Consciousness alert   Able to Follow Commands WNL/WFL   Personal Safety (Cognitive) at risk behaviors demonstrated   Memory intact   Attention No deficits were identified   Cognitive Comment Will continue to assess from functional stand point   Visual Perception   Visual Perception Wears glasses  (Glasses broken)   Visual Perception Comments Reports blurry vision from hospital, reports difficulties from taking insulin. Reports these issues have resolved since stopping taking insulin .   Sensory Examination   Sensory Comments L hand 4th/5th digit mild numbness but improving.    Pain  Assessment   Patient Currently in Pain No   Integumentary/Edema   Integumentary/Edema no deficits were identifed   Range of Motion (ROM)   ROM Comment Tightness in LUE 4th/5th digit and wrist extension, otherwise demonstrating AROM WFL.   Strength   Strength Comments Mild LUE weakness, more distal than proximal. Weakness especially with intrinsic hand muscles.   Hand Strength   Left hand  (pounds) 20 pounds   Right hand  (pounds) 51 pounds   Muscle Tone Assessment   Muscle Tone Comments Bilateral foot drop. Increased tone L hand   Coordination   Upper Extremity Coordination Left UE impaired   Left hand, nine hole peg test (seconds) 37   Right hand, nine hole peg test (seconds) 26   ARC Assessment Only   Acute Rehab FIM See FIM scores for Mobility/ADL Assessment   Transfer Skill: Bed to Chair/Chair to Bed   Level of [place]: Bed to Chair contact guard   Physical Assist/Nonphysical Assist: Bed to Chair 1 person assist   Weight-Bearing Restrictions full weight-bearing   Assistive Device - Transfer Skill Bed to Chair Chair to Bed Rehab Eval rolling walker   Transfer Skill: Toilet Transfer   Level of [place]: Toilet contact guard   Physical Assist/Nonphysical Assist: Toilet 1 person assist   Weight-Bearing Restrictions: Toilet full weight-bearing   Assistive Device rolling walker   Upper Body Dressing   Level of [place]: Dress Upper Body stand-by assist   Physical Assist/Nonphysical Assist: Dress Upper Body set-up required   Lower Body Dressing   Level of [place]: Dress Lower Body moderate assist (50% patients effort)   Physical Assist/Nonphysical Assist: Dress Lower Body 1 person assist   Grooming   Level of [place]: Grooming stand-by assist   Physical Assist/Nonphysical Assist: Grooming supervision   Instrumental Activities of Daily Living (IADL)   Previous Responsibilities meal prep;housekeeping;laundry;shopping;medication management;finances;work   Activities of Daily Living Analysis    Impairments Contributing to Impaired Activities of Daily Living balance impaired;coordination impaired;fear and anxiety;muscle tone abnormal;post surgical precautions;ROM decreased;strength decreased   General Therapy Interventions   Planned Therapy Interventions ADL retraining;IADL retraining;balance training;fine motor coordination training;ROM;strengthening;stretching;transfer training;home program guidelines;progressive activity/exercise;motor coordination training;neuromuscular re-education   Clinical Impression   Criteria for Skilled Therapeutic Interventions Met yes, treatment indicated   OT Diagnosis Decreased IND ADLs/IADLs   Influenced by the following impairments Deconditioning, sternal precautions, mild LUE weakness, impaired balance, multiple medical co-morbidities, medical non compliance.   Assessment of Occupational Performance 5 or more Performance Deficits   Identified Performance Deficits Performance deficits include mobility, transfers, dressing, g/h, toileting, bathing, home management, community transportation, and work.   Clinical Decision Making (Complexity) High complexity   Therapy Frequency daily   Predicted Duration of Therapy Intervention (days/wks) 7 days   Anticipated Equipment Needs at Discharge other (see comments);dressing equipment;reacher;shower chair  (4ww)   Anticipated Discharge Disposition Home with Outpatient Therapy   Risks and Benefits of Treatment have been explained. Yes   Patient, Family & other staff in agreement with plan of care Yes   Clinical Impression Comments The pt will benefit from skilled OT intervention to address goals in POC and maximize functional IND and safety in d/c environment.   Total Evaluation Time   Total Evaluation Time (Minutes) 30

## 2018-10-19 NOTE — PLAN OF CARE
Orientation    Pt is alert and oriented X4 and able to make needs known     VS VSS.     at dinner  Denies chills and fever  No shortness of breath or chest pain reported    Pain Denies pain   Intake    Output Regular diet + 2gm NA diet, has good appetite    Voiding without difficulties in bathroom      Activity and    Equipment   Assist of one with walker     CMS and Neuro s   Intact, denies numbness and tingling in all extremities      Skin Sternal incision is WDL and ADWOA       GI Denies nausea/vomiting, abdominal discomfort  Passing flatus, LBM - yesterday    Plan  Stroke class tomorrow at 12:30 pm. Pt and family aware   Continue with POC

## 2018-10-19 NOTE — PROGRESS NOTES
10/19/18 1500   General Information, SLP   Type of Evaluation Cognitive-Linguistic;Speech and Language   Type of Visit Initial   Start of Care Date 10/18/18   Referring Physician Aide Merlos MD   Patient/Family Goals Statement Go home    Pertinent History of Current Problem Christina Sanchez is a 53 year old RHD womanwith a PMH including but not limited to HTN, uncontrolled diabetes (HbA1c 9.1), CAD, CHF, bilateral foot drop, and medication non-compliance who presented to [facility] on 10/1/18 for shortness of breath, chest pain, productive cough, and nausea, and found to be in CHF exacerbation and hypertensive emergency.  Hospital course was complicated by UTI and bacteremia, and found NSTEMI requiring transfer to [facility] for further treatment and workup.  Found to have 2 vessel disease, s/p CABG which was complicated by embolic CVA.  Now on Aspirin and coumadin.    General Info Comments Pt is alert and agreeable. She does not notice any signficant cognitive changes, but admits that when she was receiving insulin in the hospital she would become very fuzzy and was unable to concentrate or complete daily tasks. She notes this is much improved since stopping insulin. 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 She would like to get back to work at some point, not necessarily the same job but a job. She enjoys her 9 grandchildren, doing puzzles, watching hoarse racing and is very appreciative of her significant other of 15+ years    Speech   Deficits in Speech Respiration None   Deficits in Phonation None   Deficits in Articulation None   Deficits in Resonance None   Deficits in [place] None   Speech Comments Speech is within function limits across domains    Language: Auditory Comprehension (understanding of spoken language)   Two Step Commands (out of 5 total) 5    Yes/No Sentence and Simple Paragraph; [place] Diagnostic Aphasia Exam 3 short (out of 6 total) 6   Paragraph; Discourse Comprehension Test (out of 8 total; less than 7 is below mean) 4   Comments (Auditory Comprehension) Difficulty recalling information from paragraphs    Language: Verbal Expression (use of spoken language to express information)   Picture Description; [place] Diagnostic Aphasia Exam rating/[place] Test for Differential Diagnosis Of Aphasia Picture (out of 5 total) 6   Generative Naming Score; Cognitive Linguistic Quick Test (concrete)   Comments (Verbal Expression) Pt stated 12 items during concrete naming tasks and 16 during abstract tasks. She was able to describe a picture with fluent speech, no evidence of word finding, expressive language in tact based on informal evaluatoin    Reading Comprehension (understanding of written language)   Match Letters/Match Words (out of 8 total) 8   Commands (out of 5 total) 5   Comments (Reading Comprehension) pt able to read at the sentence level and short paragraph level. Pt is having some difficulty with vision given that at baseline she requires perscription glasses and they are currently broken.    Written Expression (use of writing to express information)   Comments (Written Expression) writing completed at the short phrase level, ongoing assessment warranted    Pragmatics (the social or functional use of a language)   Deficits noted in Nonverbal None   Deficits noted in Conversational Skills None   Deficits noted in the Use of Linguistic Context None   Comments (Pragmatics) Pt was appropriate t/o the session. Became emotional when talking about her s/o   Cognitive Status Examination   Attention intact   Behavioral Observations WFL   Orientation Fully oriented    Visual Impairments Include (glasses at baseline - not here - they are broken )   Short Term Memory impaired   Long Term Memory intact   Reasoning intact   Organization impaired   Executive  Function Deficits Noted organization;planning   Additional cognitive-linguistic evaluation indicated  yes;RBLOGAN   General Therapy Interventions   Planned Therapy Interventions Cognitive Treatment   Clinical Impression, SLP Eval   Criteria for Skilled Therapeutic Interventions Met Yes   SLP Diagnosis Cognitive/ling deficits below baseline    Rehab Potential Good, to achieve stated therapy goals   Therapy Frequency Daily   Predicted Duration of Therapy Intervention (days/wks) 2 weeks   Anticipated Discharge Disposition Home   Risks and Benefits of Treatment have been explained. Yes   Patient, Family & other staff in agreement with plan of care Yes   Clinical Impression Comments Pt completed cognitive/linguistic evaluation with SLP. Pt presented with adequate attention t/o evaluation, she admitted to being nervous intermittently and some anxiety related to testing may have impacted scores to some extend - regardless ongoing speech services are recommend to target cognitive deficits. Pt was fully oriented, provided a detailed and accurate hx of hospitalization. Pt presented with adequate comprehension across tasks, increased difficulty with longer paragraphs, however these deficits are felt related to memory vs comprehension. No expressive language errors identified, speech is appropriate, fluent, and without word finding errors or paraphasias. Cognitive deficits identified during memory tasks involving more complex information and in larger quantities, difficult also noted when being required to make inferences given information. Pt also had difficulty during reasoning tasks, she was able to identify the correct answer in 75% of trials, however was unable to explain her reasoning. Initiate speech services to address cognitive/linguistic deficits targeting short term memory, problem solving and reasoning (executive functions) and assessment of reading and writing    Total Evaluation Time      Total Evaluation Time  (Minutes) 60

## 2018-10-19 NOTE — PROGRESS NOTES
"  [facility]   Acute Rehabilitation Unit  Daily progress note    INTERVAL HISTORY  No acute events overnight.  This morning Christina has no concerns or complaints.  Therapy evaluations ongoing, was seen this morning ambulating with a rolling walker, CGA.  She says she is easily fatigued.  Denies chest pain or shortness of breath.  Glucose levels in the 200s.        MEDICATIONS  Scheduled:    amLODIPine  10 mg Oral Daily     aspirin  81 mg Oral Daily     atorvastatin  80 mg Oral At Bedtime     furosemide  20 mg Oral BID     losartan  100 mg Oral Daily     metoprolol tartrate  125 mg Oral BID     omeprazole  20 mg Oral QAM AC     potassium chloride  10 mEq Oral BID     sitagliptin  50 mg Oral Daily     warfarin  6 mg Oral ONCE at 18:00        PRN:  acetaminophen, bisacodyl, docusate sodium, hydrALAZINE, ipratropium - albuterol 0.5 mg/2.5 mg/3 mL, magnesium hydroxide, ondansetron, - MEDICATION INSTRUCTIONS -, polyethylene glycol, sennosides, Warfarin Therapy Reminder       PHYSICAL EXAM  Patient Vitals for the past 24 hrs:   BP Temp Temp src Pulse Resp SpO2 Height Weight   10/19/18 0812 135/58 98.4  F (36.9  C) Oral 70 14 97 % - -   10/19/18 0639 - - - - - - - 82 kg (180 lb 12.8 oz)   10/19/18 0339 160/67 - - 72 - - - -   10/18/18 2354 166/75 98.8  F (37.1  C) Oral 74 16 92 % - -   10/18/18 1917 152/67 98.4  F (36.9  C) Oral 71 14 96 % - -   10/18/18 1536 151/63 97.5  F (36.4  C) Oral 70 14 96 % - -   10/18/18 1300 162/68 97.4  F (36.3  C) Oral 66 12 96 % 1.575 m (5' 2\") 82.6 kg (182 lb 3.2 oz)       GEN: NAD, flat affect  HEENT: NC/AT  CVS: RRR, S1+S2, no m/r/g.  +Sternal incision healing well.  PULM: CTA b/l, no w/r/r  ABD: Soft, NT, ND, bowel sounds present.  +Umbilical hernia present.  EXT: No LE edema or calf tenderness b/l  Neuro: Answers appropriately, follows commands    LABS  CBC RESULTS:   Recent Labs   Lab Test  10/19/18   0532   WBC  8.1   RBC  3.27*   HGB  9.6*   HCT  " 30.4*   MCV  93   MCH  29.4   MCHC  31.6   RDW  15.4*   PLT  333       Last Comprehensive Metabolic Panel:  Sodium   Date Value Ref Range Status   10/19/2018 135 133 - 144 mmol/L Final     Potassium   Date Value Ref Range Status   10/19/2018 4.1 3.4 - 5.3 mmol/L Final     Chloride   Date Value Ref Range Status   10/19/2018 99 94 - 109 mmol/L Final     Carbon Dioxide   Date Value Ref Range Status   10/19/2018 28 20 - 32 mmol/L Final     Anion Gap   Date Value Ref Range Status   10/19/2018 8 3 - 14 mmol/L Final     Glucose   Date Value Ref Range Status   10/19/2018 263 (H) 70 - 99 mg/dL Final     Urea Nitrogen   Date Value Ref Range Status   10/19/2018 28 7 - 30 mg/dL Final     Creatinine   Date Value Ref Range Status   10/19/2018 1.07 (H) 0.52 - 1.04 mg/dL Final     GFR Estimate   Date Value Ref Range Status   10/19/2018 54 (L) >60 mL/min/1.7m2 Final     Comment:     Non  GFR Calc     Calcium   Date Value Ref Range Status   10/19/2018 8.5 8.5 - 10.1 mg/dL Final     INR   Date Value Ref Range Status   10/19/2018 1.63 (H) 0.86 - 1.14 Final          Recent Labs  Lab 10/19/18  1140 10/19/18  0532 10/18/18  2100 10/18/18  1718   GLC  --  263*  --   --    *  --  252* 292*       ASSESSMENT  Christina Sanchez is a 53 year old RHD womanwith a PMH including but not limited to HTN, uncontrolled diabetes (HbA1c 9.1), CAD, CHF, bilateral foot drop, and medication non-compliance who presented to [facility] on 10/1/18 for shortness of breath, chest pain, productive cough, and nausea, and found to be in CHF exacerbation and hypertensive emergency.  Hospital course was complicated by UTI and bacteremia, and found NSTEMI requiring transfer to [facility] for further treatment and workup.  Found to have 2 vessel disease, s/p CABG which was complicated by embolic CVA.  Now on Aspirin and coumadin.       Impairment group code: 01.4 No Paresis, bilateral hemispheric strokes following CABG  x3      PLAN  Rehabilitation  -Continue inpatient rehabilitation including PT, OT, and speech therapies.  Evaluations ongoing.    -Needs multidisciplinary approach including therapies, rehab nursing, social work, and physiatry.    Medical  1)Neurology  -Bilateral multiple hemispheric CVAs due to embolism in the setting of CABG                         -MRI in 6-8 weeks to follow up evolution of strokes                         -Follow up with neurology after discharge                         -ASA 81 mg and Coumadin (goal 2-3) for secondary stroke prevention.  In addition will provide ongoing education regarding risk factor modification including controlling HTN and DM, medication compliance, lifestyle and diet changes, exercise, and physician follow up  -L arm numbness                         -EMG as an outpatient.  MRI shows stenosis at the C5-6 level, however her symptoms are in the ulnar or C8/T1 distribution     2)Cardiology  -CAD s/p NSTEMI and CABG x3                         -ASA 81 mg daily                         -Sternal precautions 3 months, no lifting >10 lbs for 6-8 weeks                         -May wash incision with soap                         -Follow up with CV surgery (Olga Lidia Mcclendon NP, appointment on 11?/3)  -HTN/CHF                         -Norvasc 10 mg daily, Cozaar 100 mg daily, Metoprolol 125 mg BID                         -Lasix 20 mg BID with potassium supplementation x4 days.  Will monitor daily weights and re-evaluate need at that time.  Monitor kidney function, if worsens then will discontinue Lasix earlier.                         -Hydralazine PRN for SBP >160 mmHg  -HLD: Lipitor 80 mg daily  -Follow up with cardiology made on December 6 with Dr. Cantrell     3)Pulm  -Acute respiratory failure post op due to volume overload, now off supplemental oxygen  -Chest tubes removed 10/5  -Duonebs q4h PRN  -Encourage incentive spirometry     4)FENGI  -Diet: Diabetic/Cardiac, regular consistency  solids and thin liquids  -Monitor sodium and potassium levels  -Bowel meds PRN  -Zofran PRN  -Prilosec daily for GI prophylaxis and GERD     5)  -PVRs WNL, may discontinue checks.  -Prior UTI and bacteremia, now completed treatment  -Prior acute on chronic kidney injury, Creatinine slowly increasing again.  Will re-check BMP tomorrow and if continues to rise will discontinue Lasix.     6)DVT prophylaxis  -On Coumadin for embolic CVA, pharmacy to assist with INR monitoring (goal 2-3) and dosing  -INR this morning subtherapeutic at 1.63    7)Pain  -Has not complained of pain, will order Tylenol PRN     8)Endocrine  -Uncontrolled DM  -Patient started on Januvia 50 mg daily, however has refused insulin and Metformin and insists this is the etiology of her symptoms.  Check glucose levels qAC and at bedtime  -Glucose levels consistently in the 200s.  Will consult endocrinology for assistance with glucose control in the setting of patient's hesitance/refusal of many medications including insulin.     9)ID  -Prior bacteremia and UTI, now completed treatment  -Monitor for signs of infection     10)Psych  -Has expressed depression and difficulty coping.  Will ask health psychology to evaluate.     11)Heme  -Acute blood loss anemia post-operatively, Hgb 9.6 on 10/19.  Overall stable.  -No evidence of active bleeding, monitor peripherally     12)MSK  -Umbilical hernia: Needs follow up with general surgery (Dr. Ambrocio) for repair in 1-2 months     13)Dispo:  -Anticipate discharge home  -Goals: Modified independent for ambulation with walker, and Mod I for all ADLs  -Rehab Prognosis: Good  -ELOS: 7 days     Code status: Full (discussed with patient)     Shan Merlos MD  Department of Rehabilitation Medicine  Pager: [phone]      Time Spent on this Encounter   I, Shan Merlos, spent a total of 25 minutes bedside and on the inpatient unit today managing the care of Christina Sanchez.  Over 50% of my time on the unit was spent  counseling the patient and /or coordinating care regarding glucose levels, and medical and functional progress. See note for details.

## 2018-10-19 NOTE — PROGRESS NOTES
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 Apply a thin layer of Crit[employer] Paste. Do not scrub paste to remove! Use a soft cloth and baby/mineral oil and gently wipe away.    Orders Written  WOC Nurse follow-up plan:weekly  Nursing to notify the Provider(s) and re-consult the WOC Nurse if wound(s) deteriorates or new skin concern.    Patient History  According to provider note(s):  Christina Sanchez is a 53 year old right hand dominant woman with a PMH including but not limited to HTN, uncontrolled diabetes (HbA1c 9.1), CAD, CHF, bilateral foot drop (pt states due to Atenolol, prior notes state could be due to medication side effect vs. Viral infection sequelae), and medication non-compliance who presented to [facility] on 10/1/18 for shortness of breath, chest pain, productive cough, and nausea.  She was diagnosed with a CHF exacerbation and hypertensive emergency, as BP was 221/95.  Treatment was initiated, and was also started on Zosyn for bacteremia and UTI.  Troponins were then found to be elevated and uptrending with subsequent EKG changes, and she was then transferred to [facility] for further evaluation.  At [facility], a LHC was performed which showed severe 2 vessel disease and she subsequently underwent a CABG x3 on 10/8 by Dr. Whitney.  Intra-operatively she required pressor support and transfusion of 3 units PRBCs.  Post-operative developed SILVERIO and hyponatremia for which nephrology was consulted and felt was due to diabetic nephropathy and fluid overload, and responded well to diuretics.  On POD 5 she complained of left arm numbness and other non-specific neurological symptoms,  and an MRI showed multiple bilateral cerebral embolic infarctions.  She was started on ASA and Coumadin for this, with Plavix discontinued.  Workup included a CTA of the head and neck which showed occlusion of the left PCA and 50-70% stenosis of the proximal R ICA, and an echo showed an EF of 65-70% without vegetations.  Other notable events include surgical consultation for a large umbilical hernia, which will be re-evaluated in 1-2 months.  Of note, the patient is very particular about diabetes medications including refusal of insulin and Metformin due to prior adverse effects, and also blames her current deficits on insulin she received during her hospitalization.  She says she is very brittle, and sensitive to medications in general.  She is currently on Januvia only.  She also complained of L arm numbness, and an MRI C-spine showed moderate to severe spinal canal and left foraminal narrowing at C5-6.  Outpatient EMG was recommended.     Currently she feels like she is at her baseline cognitive status since insulin was discontinued.  She denies pain or trouble swallowing.  Has baseline decreased vision and was worse a few days ago, but now back to baseline.  No problems with bowel or bladder and appetite is good.  Per nursing staff has expressed feelings of depression and would like to see health psychology.    Patient transferred to ARU on 10/18/18.    Objective Data  Containment of urine/stool: Incontinence Protocol    Active Diet Order    Active Diet Order      Combination Diet Regular Diet Adult; 4928-5267 Calories: Moderate Consistent CHO (4-6 CHO units/meal); 2 gm NA Diet    Output:   I/O last 3 completed shifts:  In: 150 [P.O.:150]  Out: -     Risk Assessment:   Sensory Perception: 3-->slightly limited  Moisture: 4-->rarely moist  Activity: 3-->walks occasionally  Mobility: 3-->slightly limited  Nutrition: 3-->adequate  Friction and Shear: 3-->no apparent problem  Mirza Score: 19                           Labs:   Recent Labs  Lab 10/19/18  0532   HGB 9.6*   INR 1.63*   WBC 8.1       Physical Exam  Skin inspection: focused buttock, perianal, perineal    Wound Location:  Perianal, jimenez cleft  Date of last photo 10/19/18      Wound History: Patient is incontinent of urine and smears of stool throughout the day. Patient not always aware of incontinence. Photo shows mild irritation on buttock and in the  cleft. More extensive injury close to the anal opening.   Measurements (length x width x depth, in cm) not measured today   Wound Base: dermis and mucosal membrane close to the anal opening, healing epidermis on buttock  Palpation of the wound bed: normal   Periwound skin: denuded  Color: pink  Temperature: normal   Drainage:, none  Description of drainage: none  Odor: none  Pain: pain during cleansing, burning    Interventions  Current support surface: Standard  Atmos Air mattress  Current off-loading measures: Chair cushion available in room  Visual inspection of wound(s) completed  Wound Care: done per plan of care  Supplies: floor stock  Education provided: plan of care  Discussed plan of care with Patient and Nurse    Sara Rao RN, CWOCN

## 2018-10-19 NOTE — CONSULTS
Please consult endocrinology for work up of hypoglycemia disorder. Discussed with endo fellow .  They would rather it be worked up in clinic because not hypoglycemic at this time   She will try sitagliptin for now.for PP hyperglcemia; but will not take any agents that may cause hypoglycemia  Ms. Siu is very focused on her hypoglycemic history rather than the hyperglycemia she is experiencing now after discussing how sitagliptin works and very low likelihood of causing sitagliptin she is willing to try this for now she is very interested in trying to have a workup for her hypoglycemic issues that occur when she does not take medication and has been present for years she described an episode in which she felt like she was going to convulse and that this went on for several hours also states that her daughter and sister suffer from the same phenomena    Full consult to follow tomorrow continue with Januvia 50 today will consider increase in next couple of days  Continue with current blood glucose monitoring schedule      Griselda Javier CNP   No charge for this visit

## 2018-10-19 NOTE — PLAN OF CARE
Problem: Patient Care Overview  Goal: Plan of Care/Patient Progress Review  Outcome: No Change  A & O x 4. Denies SOB. Lung sounds clear. /75, prn Hydralazine given. BP reassessed, 160/67 and prn medication parameters not met - continued to monitor, patient asymptomatic upon assessments. Denies pain. L hand numbness on 4th and 5th fingers. Foot drop on both ft. Chest incision ADWOA, no drainage noted. 2 g sodium diet. LBM 10/18/2018. Moves w/ gait belt and FWW. Call light in reach.

## 2018-10-19 NOTE — PLAN OF CARE
Problem: Patient Care Overview  Goal: Plan of Care/Patient Progress Review  SLP: Cognitive/linguistic evaluation completed - Pt completed cognitive/linguistic evaluation with SLP. Pt presented with adequate attention t/o evaluation, she admitted to being nervous intermittently and some anxiety related to testing may have impacted scores to some extend - regardless ongoing speech services are recommend to target cognitive deficits. Pt was fully oriented, provided a detailed and accurate hx of hospitalization. Pt presented with adequate comprehension across tasks, increased difficulty with longer paragraphs, however these deficits are felt related to memory vs comprehension. No expressive language errors identified, speech is appropriate, fluent, and without word finding errors or paraphasias. Cognitive deficits identified during memory tasks involving more complex information and in larger quantities, difficult also noted when being required to make inferences given information. Pt also had difficulty during reasoning tasks, she was able to identify the correct answer in 75% of trials, however was unable to explain her reasoning.     Initiate speech services to address cognitive/linguistic deficits targeting short term memory, problem solving and reasoning (executive functions) and additional assessment of reading and writing

## 2018-10-19 NOTE — PLAN OF CARE
Problem: Goal/Outcome  Goal: Goal Outcome Summary  Patient A&O x4, bowel sound active, denied CP, lightheadedness, dizziness and SOB. BG monitored, continent of bowel and bladder, voiding spontaneously without difficulties, LBM yesterday, no complain of pain during the shift, upright in chair, self repositioned and turned in bed, able to use call light appropriately and make needs known, pt will have stroke class tomorrow at 1230 and pt updated. Will continue to monitor patient as POC.

## 2018-10-19 NOTE — PHARMACY-MEDICATION REGIMEN REVIEW
Pharmacy Medication Regimen Review  Christina Sanchez is a 53 year old female who is currently in the Acute Rehab Unit.    Assessment: All medications have an appropriate indications, durations and no unnecessary use was found  INR goal 2-3    Plan:   Continue current therapy. Blood glucose is not controlled due to patient medication preferences- provider is aware.  Attending provider will be sent this note for review.  If there are any emergent issues noted above, pharmacist will contact provider directly by phone.      Pharmacy will periodically review the resident's medication regimen for any PRN medications not administered in > 72 hours and discontinue them. The pharmacist will discuss gradual dose reductions of psychopharmacologic medications with interdisciplinary team on a regular basis.    Please contact pharmacy if the above does not answer specific medication questions/concerns.    Background:  A pharmacist has reviewed all medications and pertinent medical history today.  Medications were reviewed for appropriate use and any irregularities found are listed with recommendations.      Current Facility-Administered Medications:      acetaminophen (TYLENOL) tablet 325-650 mg, 325-650 mg, Oral, Q6H PRN, Aide Merlos MD     amLODIPine (NORVASC) tablet 10 mg, 10 mg, Oral, Daily, Aide Merlos MD, 10 mg at 10/19/18 0813     aspirin chewable tablet 81 mg, 81 mg, Oral, Daily, Aide Merlos MD, 81 mg at 10/19/18 0816     atorvastatin (LIPITOR) tablet 80 mg, 80 mg, Oral, At Bedtime, Aide Merlos MD, 80 mg at 10/18/18 2144     bisacodyl (DULCOLAX) Suppository 10 mg, 10 mg, Rectal, Daily PRN, Aide Merlos MD     docusate sodium (COLACE) capsule 100 mg, 100 mg, Oral, BID PRN, Aide Merlos MD     furosemide (LASIX) tablet 20 mg, 20 mg, Oral, BID, Aide Merlos MD, 20 mg at 10/19/18 0813     hydrALAZINE (APRESOLINE) tablet 10 mg, 10 mg, Oral, Q6H PRN, Aide Merlos  MD Shan, 10 mg at 10/19/18 0110     ipratropium - albuterol 0.5 mg/2.5 mg/3 mL (DUONEB) neb solution 3 mL, 3 mL, Nebulization, Q4H PRN, Aide Merlos MD     losartan (COZAAR) tablet 100 mg, 100 mg, Oral, Daily, Aide Merlos MD, 100 mg at 10/19/18 0814     magnesium hydroxide (MILK OF MAGNESIA) suspension 30 mL, 30 mL, Oral, Daily PRN, Aide Merlos MD     metoprolol tartrate (LOPRESSOR) tablet 125 mg, 125 mg, Oral, BID, Aide Merlos MD, 125 mg at 10/19/18 0814     omeprazole (priLOSEC) CR capsule 20 mg, 20 mg, Oral, QAM AC, Aide Merlos MD     ondansetron (ZOFRAN) tablet 4 mg, 4 mg, Oral, Q6H PRN, Aide Merlos MD     Patient is already receiving anticoagulation with heparin, enoxaparin (LOVENOX), warfarin (COUMADIN)  or other anticoagulant medication, , Does not apply, Continuous PRN, Aide Merlos MD     polyethylene glycol (MIRALAX/GLYCOLAX) Packet 17 g, 17 g, Oral, Daily PRN, Aide Merlos MD     potassium chloride SA (K-DUR/KLOR-CON M) CR tablet 10 mEq, 10 mEq, Oral, BID, Aide Merlos MD, 10 mEq at 10/19/18 0813     sennosides (SENOKOT) tablet 2 tablet, 2 tablet, Oral, BID PRN, Aide Merlos MD     sitagliptin (JANUVIA) tablet 50 mg, 50 mg, Oral, Daily, Aide Merlos MD, 50 mg at 10/19/18 0814     warfarin (COUMADIN) tablet 6 mg, 6 mg, Oral, ONCE at 18:00, Aide Merlos MD     Warfarin Therapy Reminder (Check START DATE - warfarin may be starting in the FUTURE), 1 each, Does not apply, Continuous PRN, Aide Merlos MD  No current outpatient prescriptions on file.   PMH: CAD s/p NSTEMI, CABG x3 on 10/8/18, Bilateral multiple hemispheric CVAs due to embolism in the setting of CABG, HTN, HLD, uncontrolled diabetes (HbA1c 9.1), CAD, CHF, bilateral foot drop (pt states due to Atenolol, prior notes state could be due to medication side effect vs. Viral infection sequelae), and medication non-compliance

## 2018-10-19 NOTE — PLAN OF CARE
Problem: Goal/Outcome  Goal: Goal Outcome Summary  Outcome: No Change  FOCUS/GOAL  Medical management, Mobility and Skin integrity    ASSESSMENT, INTERVENTIONS AND CONTINUING PLAN FOR GOAL:  Pt used call light appropriately to make needs known.   Denies pain, chest pain. Pt eports occasional SOB with exertion. Ambulated to bathroom with CGA and walker without SOB. Using IS with encouragement.   BP elevated but no prn hydralazine needed per parameter. Scheduled metoprolol given at HS.    before supper and  at HS. Not on insulin since pt had s/e per pt.   Voiding spontaneously. PVR 5cc. LBM today per pt.   Sternal incision CDI, ADWOA. Coccyx area has erythema, pt reports it is related with pressure ulcer at hospital. WOCN ordered to see the area tomorrow.   Bed alarm on.

## 2018-10-19 NOTE — PROGRESS NOTES
10/19/18 1300   Signing Clinician's Name / Credentials   Signing clinician's name / credentials Razia Collazo DPT   Payton Balance Scale (MADELINE PEREZ, RAKESH S, ADIA URIARTE, SAVANNAH BECK: MEASURING BALANCE IN THE ELDERLY: VALIDATION OF AN INSTRUMENT. CAN. J. PUB. HEALTH, JULY/AUGUST SUPPLEMENT 2:S7-11, 1992.)   Sit To Stand 4   Standing Unsupported 4   Sitting Unsupported 4   Stand to Sit 4   Transfers 3   Standing with Eyes Closed 3   Standing Unsupported, Feet Together 4   Reach Forward With Outstretched Arm 2   Retrieve Object From Floor 1   Turning to Look Behind 4   Turn 360 Degrees 0   Placing Alternate Foot on Stool (4-6 inches) 0   Unsupported Tandem Stand (Demonstrate to Subject) 0   One Leg Stand 1   Total Score (A score of 45 or less has been correlated with an increased risk of falls)   Total Score (out of 56) 34   Payton Balance Scale (BBS) Cutoff Scores: A score of ? 45/56 indicates an increased risk for falls.   Patient Score: 34/56    The BBS is a measure of static and dynamic standing balance that has been validated in community dwelling elderly individuals and individuals who have Parkinson's Disease, MS, and those who are s/p CVA and TBI. The test is administered without an assistive device. Scores from the Payton are used to determine the probability of falling based on the patient's previous history of falls and their test performance.     Minimal Detectable Change = 6.5   & Minimal Detectable Change (Parkinson's Disease) = 5 according to Robert & Sammyey 2008    Assessment (rationale for performing, application to patient s function & care plan): Assessment performed to determine static balance w/o support. Pt scored 34/56, indicating a medium risk for falls. Pt reports inconsistent use of 4WW at home prior to admission and hospital course. This test combined with clinical reasoning based on observation of functional movement indicate need for assistive device at this time for safe  mobility.

## 2018-10-19 NOTE — PLAN OF CARE
Problem: Goal/Outcome  Goal: Goal Outcome Summary  Discharge Planner OT   Patient plan for discharge: Home with OP OT/PT  Current status: CGA functional transfers/mobility with 4ww, SBA standing g/h, Mod A LB dressing, s/u UB dressing, and Min A toileting.   Barriers to return to prior living situation: Current level of assistance and medical non-compliance  Recommendations for discharge: Anticipate 7 days to address goals in POC. Goal to progress to Mod IND/IND ADLs and IADLs.   Rationale for recommendations: Prior level of function is IND ADL/IADL and working full time. Pt has good family support and appropriate home set up. Pt will benefit from skilled OT intervention to address deconditioning, balance, LUE strengthening, and safety awareness with ADL/IADL.       Entered by: Jael Busby 10/19/2018 12:43 PM

## 2018-10-19 NOTE — PLAN OF CARE
Problem: Patient Care Overview  Goal: Plan of Care/Patient Progress Review  Discharge Planner PT   Patient plan for discharge: home w/ assist and OP PT  Current status: pt amb w/ 4WW 150' x2, sit<>stands Espinoza-CGA depending on surface height and cardiac pillow for sternal precautions. Pt presents w/ some balance deficits w/o UE support. Recommend use of 4WW at this time. Pt uses B AFOs for foot drop.  Barriers to return to prior living situation: mobility, precautions, safety   Recommendations for discharge: home w/ assist and OP PT  Rationale for recommendations: estimate 7 day LOS w/ return home w/ support of family as needed. Potential for pt to progress to no assistive device depending on progress and safety.        Entered by: Razia Collazo 10/19/2018 5:45 PM

## 2018-10-19 NOTE — PROGRESS NOTES
10/19/18 1009   Quick Adds   Type of Visit Initial PT Evaluation       Present no   Language english   Living Environment   Lives With spouse;grandchild(praveen);child(praveen), adult;other (see comments)   Living Arrangements house   Home Accessibility stairs to enter home;grab bars present (bathtub)   Number of Stairs to Enter Home 2   Number of Stairs Within Home 0  (flight to basement, does not need to use)   Stair Railings at Home outside, present at both sides   Transportation Available family or friend will provide   Self-Care   Dominant Hand right   Usual Activity Tolerance moderate   Current Activity Tolerance fair   Regular Exercise no   Equipment Currently Used at Home walker, rolling;grab bar   Activity/Exercise/Self-Care Comment PT: pt works, reports using 4WW at work but not at home. Self reports furniture walking/ holding on to  for support at times   Functional Level Prior   Ambulation 0-->independent   Transferring 0-->independent   Toileting 0-->independent   Bathing 0-->independent   Dressing 0-->independent   Eating 0-->independent   Communication 0-->understands/communicates without difficulty   Swallowing 0-->swallows foods/liquids without difficulty   Cognition 0 - no cognition issues reported   Fall history within last six months no   Which of the above functional risks had a recent onset or change? transferring;ambulation;toileting;bathing;dressing   Prior Functional Level Comment PT: Pt was IND transfers/mobility, IND ADLs, and shared responsibility with family for IADLs.   General Information   Onset of Illness/Injury or Date of Surgery - Date 10/01/18   Referring Physician Aide Merlos MD   Patient/Family Goals Statement PT: to return home and get back to normal again   Pertinent History of Current Problem (include personal factors and/or comorbidities that impact the POC) PT: pt presented to 10/01/18 to hospital w/ SOB, HTN and found to have CHF  exacerbation and hypertensive emergency. Pt underwent CABG x3 on 10/08/18. POD 5 pt sustained bilateral emoblic strokes (L PCA, R ICA). Pt w/ significant PMH of DM w/ insulin and medical non-compliance, uncontrolled HTN, Bilateral foot drop (pt reports this is from Atenolol, prior notes state could be due to medication side effect vs. Viral infection sequelae).   Precautions/Limitations sternal precautions;fall precautions   General Observations PT: pt generally untrusting of medical cares, frequently statind that her deficits are from diabetes, medications and general medical care. Pt has B foot drop and uses posterior leaf spring AFOs. Pt reports that she just recently started wearing them again, but has had them for 5 years   Cognitive Status Examination   Orientation orientation to person, place and time   Level of Consciousness alert   Follows Commands and Answers Questions 100% of the time   Personal Safety and Judgment intact   Memory intact   Pain Assessment   Patient Currently in Pain No   Integumentary/Edema   Integumentary/Edema other (describe)   Integumentary/Edema Comments PT: noted to have buttock wound being managed by nursing staff   Posture    Posture Protracted shoulders;Kyphosis   Range of Motion (ROM)   ROM Comment PT: mild heel cord decreased ROM bilaterally, otherwise WFL   MMT: Hip, Rehab Eval   Hip Flexion - Left Side (4-/5) good minus, left   Hip ABduction - Left Side (4/5) good, left   Hip ADduction - Left Side (4/5) good, left   Hip Flexion - Right Side (4/5) good, right   Hip ABduction - Right Side (4/5) good, right   Hip ADduction - Right Side (4/5) good, right   MMT: Knee, Rehab Eval   Knee Flexion - Left Side (5/5) normal,left   Knee Flexion - Right Side (5/5) normal,right   MMT: Ankle, Rehab Eval   Ankle Dorsiflexion - Left Side (0/5) zero, left   Ankle Dorsiflexion - Right Side (0/5) zero, left   ARC Assessment Only   Acute Rehab FIM See FIM scores for Mobility/ADL Assessment    Balance   Balance Comments PT: Payton Balance Assessment 34/56. Good dynamic balance when using device.    Sensory Examination   Sensory Perception Comments PT: WFL BLE light touch, deep pressure.    Coordination   Coordination Comments PT: WFL   Muscle Tone   Muscle Tone Comments PT: WFL   Modality Interventions   Planned Modality Interventions TENS;Microcurrent Electrical Stimulation   Planned Modality Interventions Comments PT: for sensation/ foot drop   General Therapy Interventions   Planned Therapy Interventions bed mobility training;gait training;neuromuscular re-education;strengthening;stretching;ROM;transfer training;risk factor education;home program guidelines;progressive activity/exercise   Clinical Impression   Criteria for Skilled Therapeutic Intervention yes, treatment indicated   PT Diagnosis PT: impaired gait, impaired functional mobility   Influenced by the following impairments PT: LE weakness, balance deficits, decreased activity tolerance   Functional limitations due to impairments PT: impaired gait, transfers. Decreased independence with functional mobility   Clinical Presentation Evolving/Changing   Clinical Presentation Rationale PT: multifocal and multifactorila deficits in all three domains of complexity. Pt w/ unsafe and complicated PMH and PLOF, current comorbidities, complex hospital course, mobilizing well   Clinical Decision Making (Complexity) Moderate complexity   Therapy Frequency` daily   Predicted Duration of Therapy Intervention (days/wks) 7 days   Anticipated Discharge Disposition Home with Outpatient Therapy   Risk & Benefits of therapy have been explained Yes   Patient, Family & other staff in agreement with plan of care Yes   Clinical Impression Comments PT: pt presents to ARU w/ strength, balance and activity tolerance deficits, as well as current and past extensive comorbidities impacting function. Pt would benefit from skilled therapy in order to improve functional  independence safety.    Total Evaluation Time   Total Evaluation Time (Minutes) 20

## 2018-10-20 LAB
ANION GAP SERPL CALCULATED.3IONS-SCNC: 8 MMOL/L (ref 3–14)
BUN SERPL-MCNC: 35 MG/DL (ref 7–30)
CALCIUM SERPL-MCNC: 8.5 MG/DL (ref 8.5–10.1)
CHLORIDE SERPL-SCNC: 99 MMOL/L (ref 94–109)
CO2 SERPL-SCNC: 29 MMOL/L (ref 20–32)
CREAT SERPL-MCNC: 1.27 MG/DL (ref 0.52–1.04)
GFR SERPL CREATININE-BSD FRML MDRD: 44 ML/MIN/1.7M2
GLUCOSE BLDC GLUCOMTR-MCNC: 208 MG/DL (ref 70–99)
GLUCOSE BLDC GLUCOMTR-MCNC: 218 MG/DL (ref 70–99)
GLUCOSE BLDC GLUCOMTR-MCNC: 235 MG/DL (ref 70–99)
GLUCOSE BLDC GLUCOMTR-MCNC: 239 MG/DL (ref 70–99)
GLUCOSE BLDC GLUCOMTR-MCNC: 247 MG/DL (ref 70–99)
GLUCOSE SERPL-MCNC: 254 MG/DL (ref 70–99)
INR PPP: 1.75 (ref 0.86–1.14)
POTASSIUM SERPL-SCNC: 4.3 MMOL/L (ref 3.4–5.3)
SODIUM SERPL-SCNC: 136 MMOL/L (ref 133–144)

## 2018-10-20 PROCEDURE — 36415 COLL VENOUS BLD VENIPUNCTURE: CPT | Performed by: PHYSICAL MEDICINE & REHABILITATION

## 2018-10-20 PROCEDURE — 25000132 ZZH RX MED GY IP 250 OP 250 PS 637: Performed by: PHYSICAL MEDICINE & REHABILITATION

## 2018-10-20 PROCEDURE — 40000193 ZZH STATISTIC PT WARD VISIT

## 2018-10-20 PROCEDURE — 97535 SELF CARE MNGMENT TRAINING: CPT | Mod: GO

## 2018-10-20 PROCEDURE — 12800006 ZZH R&B REHAB

## 2018-10-20 PROCEDURE — 00000146 ZZHCL STATISTIC GLUCOSE BY METER IP

## 2018-10-20 PROCEDURE — 97110 THERAPEUTIC EXERCISES: CPT | Mod: GP

## 2018-10-20 PROCEDURE — 40000225 ZZH STATISTIC SLP WARD VISIT: Performed by: SPEECH-LANGUAGE PATHOLOGIST

## 2018-10-20 PROCEDURE — 80048 BASIC METABOLIC PNL TOTAL CA: CPT | Performed by: PHYSICAL MEDICINE & REHABILITATION

## 2018-10-20 PROCEDURE — 40000133 ZZH STATISTIC OT WARD VISIT

## 2018-10-20 PROCEDURE — 97127 ZZHC SP THERAPEUTIC INTERVENTION W/FOCUS ON COGNITIVE FUNCTION,EA 15 MIN: CPT | Mod: GN | Performed by: SPEECH-LANGUAGE PATHOLOGIST

## 2018-10-20 PROCEDURE — 85610 PROTHROMBIN TIME: CPT | Performed by: PHYSICAL MEDICINE & REHABILITATION

## 2018-10-20 RX ADMIN — WARFARIN SODIUM 7.5 MG: 2.5 TABLET ORAL at 17:01

## 2018-10-20 RX ADMIN — POTASSIUM CHLORIDE 10 MEQ: 750 TABLET, EXTENDED RELEASE ORAL at 08:03

## 2018-10-20 RX ADMIN — ATORVASTATIN CALCIUM 80 MG: 80 TABLET, FILM COATED ORAL at 20:14

## 2018-10-20 RX ADMIN — ASPIRIN 81 MG CHEWABLE TABLET 81 MG: 81 TABLET CHEWABLE at 08:03

## 2018-10-20 RX ADMIN — LOSARTAN POTASSIUM 100 MG: 100 TABLET, FILM COATED ORAL at 08:03

## 2018-10-20 RX ADMIN — SITAGLIPTIN 50 MG: 50 TABLET, FILM COATED ORAL at 08:03

## 2018-10-20 RX ADMIN — METOPROLOL TARTRATE 125 MG: 50 TABLET, FILM COATED ORAL at 08:02

## 2018-10-20 RX ADMIN — METOPROLOL TARTRATE 125 MG: 50 TABLET, FILM COATED ORAL at 20:14

## 2018-10-20 RX ADMIN — AMLODIPINE BESYLATE 10 MG: 10 TABLET ORAL at 08:03

## 2018-10-20 RX ADMIN — FUROSEMIDE 20 MG: 20 TABLET ORAL at 08:02

## 2018-10-20 NOTE — PROGRESS NOTES
10/19/18 2200   Living Arrangements   Lives With grandchild(praveen);parent(s);significant other   Living Arrangements house   Home Safety   Patient Feels Safe Living in Home? yes   Discharge Needs Assessment   Equipment Currently Used at Home grab bar;walker, rolling   Transportation Available family or friend will provide   Values Beliefs and Spiritual Care   F: Flory: Do you have a connection with a flory community, Mormon, or Oriental orthodox group? yes   The name of the flory community, Mormon, or Oriental orthodox group is: (Taoism)     Social Work: Initial Assessment with Discharge Plan    Patient Name: Christina Sanchez  : 1965  Age: 53 year old  MRN: [id number]  Completed assessment with: patient, significant other & mother  Admitted to ARU: 10/19/18    Presenting Information   Date of SW assessment: 10/19/18  Health Care Directive: none  Primary Health Care Agent: next-of-kin (adult children) would be surrogate decision-maker if necessary  Secondary Health Care Agent: n/a  Living Situation: lives in house with significant other, daughter, mother & 11 yr old granddaughter  Previous Functional Status: independent, working  DME available: see above  Patient and family understanding of hospitalization: heart surgery  Cultural/Language/Spiritual Considerations: speaks English, Taoism    Physical Health  Reason for admission: bilateral hemispheric strokes following CABG x 3, DMII (stopped taking medications about 5 years ago)    Provider Information   Primary Care Physician: Dr. 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  Income Source: none; applying for unemployment benefits  Education: high school  Financial Concerns: relying on significant other's wages currently  Insurance: MA recently eligible    Discharge Plan   Patient and family discharge goal: home with significant other & family with recommended services  Provided education on discharge plan: discussed home care/therapy services, waiver services  Patient agreeable to discharge plan: tbd  Provided education and attained signature for Medicare IM and IRF Patient Rights and Privacy Information provided to patient : n/a  Provided patient with [place] [employer] resources: have information  Barriers to discharge: none identified    Discharge Recommendations   Disposition: home with significant other & family with recommended services  Transportation Needs: family of significant other  Name of Transportation Company and Phone: n/a    Additional comments   D:  See H&P for full medical background.  I:  Met with patient, significant other and mother to complete assessment and social work consult.  A:  Patient is doing well and has good support.    P:  SW will follow and assist as needed.    Please invite to Care Conference:  Av (s.o.) [phone]      [facility]  Weekend   Phone: [phone]  Pager: [phone]

## 2018-10-20 NOTE — PLAN OF CARE
Problem: Goal/Outcome  Goal: Goal Outcome Summary  FOCUS/GOAL  Medical management    ASSESSMENT, INTERVENTIONS AND CONTINUING PLAN FOR GOAL:  Patient is alert and oriented and able to make her needs known. She transfers with CGA and a walker and is continent of bowel and bladder. She denies difficulty with pain. This morning, patient's blood glucose level was 218 prior to breakfast. She did not call for her blood glucose to be taken prior to lunch. Writer modified room service order so staff are aware when patient gets her meal. She is only taking oral medications for Diabetes. Patient did meet with Endocrinology NP this morning and the regimen will stay as is for now. Writer noted new order to discontinue Lasix and Potassium. Cr 1.27 this morning. Patient has medical paperwork that needs to be completed for work; provider is gone for the day, so paperwork to be completed tomorrow. Patient's appetite is poor and she needs encouragement and reminders to order her meals. Patient participated in PLC classes for CVA and heart failure today. Writer noted that Coumadin is new for her, so writer entered PLC for that as well. Staff to continue with plan of care .

## 2018-10-20 NOTE — PROGRESS NOTES
"PM&R PROGRESS NOTE     Patient seen and examined today.  Coordinated with team.      HPI/ACTIVE ISSUES   Christina Sanchez is a 53 year old female, admitted to [facility] ARU on 10/18/2018    53 year old woman s/p CABG which was complicated by embolic CVA.    Now on Aspirin and coumadin.    I had a long discussion with Bell about her blood sugars, her blood sugar is elevated today. We talked about complications of diabetes, she states that she is limited by brittle sugar levels, which either are low in 30-40's or too high. I am concerned that there is mild cognitive deficits which contribute to the poor insight as well   Creatinine increased from 1.07 to 1.27 today. Will hold off lasix for now. hols potassium supplements as well. And monitor closely       Functionally, Christina Sanchez is participating in the therapies in a motivated fashion. She is making good progress. She is CGA with transfers with nursing. Amb with WW going upto 300 feet with rest breaks.       REVIEW OF THE SYSTEMS   Total of ten systems reviewed, pertinent positives and negatives as follows  Denies chest pain fever chills rigors  Denies any shortness of breath   Denies any nausea or vomiting   Appetite good, drank orange juice this morning   Denies any lightheadedness or dizziness   On regular diet with thins  Denies any pain   Denies any new rashes   Voiding without any problems, no incontinence   Slept well last night   Remainder of the review of the systems was negative      Physical exam    Vital signs:  Temp: 97.2  F (36.2  C) Temp src: Oral BP: 152/71 Pulse: 70 Heart Rate: 72 Resp: 16 SpO2: 96 % O2 Device: None (Room air)   Height: 157.5 cm (5' 2\") Weight: 82 kg (180 lb 12.8 oz)  Estimated body mass index is 33.07 kg/(m^2) as calculated from the following:    Height as of this encounter: 1.575 m (5' 2\").    Weight as of this encounter: 82 kg (180 lb 12.8 oz).  HEENT NC AT PRTL EOM good   Neck supple  Heart S1S2  Lungs CTA  Abdomen  benign BS positive " NT NR   LE no edema            REVIEWED THE MEDICATIONS BELOW   Current Facility-Administered Medications   Medication     acetaminophen (TYLENOL) tablet 325-650 mg     amLODIPine (NORVASC) tablet 10 mg     aspirin chewable tablet 81 mg     atorvastatin (LIPITOR) tablet 80 mg     bisacodyl (DULCOLAX) Suppository 10 mg     docusate sodium (COLACE) capsule 100 mg     furosemide (LASIX) tablet 20 mg     hydrALAZINE (APRESOLINE) tablet 10 mg     ipratropium - albuterol 0.5 mg/2.5 mg/3 mL (DUONEB) neb solution 3 mL     losartan (COZAAR) tablet 100 mg     magnesium hydroxide (MILK OF MAGNESIA) suspension 30 mL     metoprolol tartrate (LOPRESSOR) tablet 125 mg     omeprazole (priLOSEC) CR capsule 20 mg     ondansetron (ZOFRAN) tablet 4 mg     Patient is already receiving anticoagulation with heparin, enoxaparin (LOVENOX), warfarin (COUMADIN)  or other anticoagulant medication     polyethylene glycol (MIRALAX/GLYCOLAX) Packet 17 g     potassium chloride SA (K-DUR/KLOR-CON M) CR tablet 10 mEq     sennosides (SENOKOT) tablet 2 tablet     sitagliptin (JANUVIA) tablet 50 mg     Warfarin Therapy Reminder (Check START DATE - warfarin may be starting in the FUTURE)       Results for orders placed or performed during the hospital encounter of 10/18/18 (from the past 24 hour(s))   Basic metabolic panel   Result Value Ref Range    Sodium 135 133 - 144 mmol/L    Potassium 4.1 3.4 - 5.3 mmol/L    Chloride 99 94 - 109 mmol/L    Carbon Dioxide 28 20 - 32 mmol/L    Anion Gap 8 3 - 14 mmol/L    Glucose 263 (H) 70 - 99 mg/dL    Urea Nitrogen 28 7 - 30 mg/dL    Creatinine 1.07 (H) 0.52 - 1.04 mg/dL    GFR Estimate 54 (L) >60 mL/min/1.7m2    GFR Estimate If Black 65 >60 mL/min/1.7m2    Calcium 8.5 8.5 - 10.1 mg/dL   CBC with platelets differential   Result Value Ref Range    WBC 8.1 4.0 - 11.0 10e9/L    RBC Count 3.27 (L) 3.8 - 5.2 10e12/L    Hemoglobin 9.6 (L) 11.7 - 15.7 g/dL    Hematocrit 30.4 (L) 35.0 - 47.0 %    MCV 93 78 - 100 fl    MCH  29.4 26.5 - 33.0 pg    MCHC 31.6 31.5 - 36.5 g/dL    RDW 15.4 (H) 10.0 - 15.0 %    Platelet Count 333 150 - 450 10e9/L    Diff Method Automated Method     % Neutrophils 60.0 %    % Lymphocytes 24.3 %    % Monocytes 5.3 %    % Eosinophils 9.1 %    % Basophils 0.9 %    % Immature Granulocytes 0.4 %    Nucleated RBCs 0 0 /100    Absolute Neutrophil 4.9 1.6 - 8.3 10e9/L    Absolute Lymphocytes 2.0 0.8 - 5.3 10e9/L    Absolute Monocytes 0.4 0.0 - 1.3 10e9/L    Absolute Eosinophils 0.7 0.0 - 0.7 10e9/L    Absolute Basophils 0.1 0.0 - 0.2 10e9/L    Abs Immature Granulocytes 0.0 0 - 0.4 10e9/L    Absolute Nucleated RBC 0.0    INR   Result Value Ref Range    INR 1.63 (H) 0.86 - 1.14   Glucose by meter   Result Value Ref Range    Glucose 239 (H) 70 - 99 mg/dL   Glucose by meter   Result Value Ref Range    Glucose 225 (H) 70 - 99 mg/dL   Glucose by meter   Result Value Ref Range    Glucose 232 (H) 70 - 99 mg/dL       Total of 25 min spent in the encounter, more than 50% in coordination and counseling on topics listed in the HPI

## 2018-10-20 NOTE — PROGRESS NOTES
"CLINICAL NUTRITION SERVICES - ASSESSMENT NOTE     Nutrition Prescription    RECOMMENDATIONS FOR MDs/PROVIDERS TO ORDER:  None today    Malnutrition Status:    Patient does not meet two of the above criteria necessary for diagnosing malnutrition    Recommendations already ordered by Registered Dietitian (RD):  None today    Future/Additional Recommendations:  Continue to encourage a moderate carb diet/2g Na+ diet post discharge. Consider carb counting education pending insulin plan?     REASON FOR ASSESSMENT  Christina Sanchez is a/an 53 year old female assessed by the dietitian for Admission Nutrition Risk Screen for reduced oral intake over the last month    NUTRITION HISTORY  - Pt was recently admitted at [facility] where she disliked the food and had a poor appetite (RD notes report she was eating 25-75% of meals). She felt the food tasted like medicine and oxygen. Boost Glucose Control was ordered for pt but she reports she never drank it d/t it raising her BG in the past.   - She follows a regular diet, stating she eats meat and veggies. When asked about her consistent carb and low sodium diet, she stated she avoids potatoes, bread and rice d/t increasing BG. She also doesn't use salt. See endo note from 10/20 for more information pertaining to pts T2DM.     CURRENT NUTRITION ORDERS  Diet: 2 g Sodium and Moderate Consistent Carbohydrate, RSA  Intake/Tolerance: 75% of meals per flowsheet. Pt ate 100% of lunch today, reporting it \"actually tasted really good\"    LABS  Labs reviewed  - BG x24 hrs: 218-254  - A1C 11.8 (10/2 -- [facility])    MEDICATIONS  Medications reviewed  - Januvia (started 10/19)    ANTHROPOMETRICS  Ht Readings from Last 1 Encounters:   10/18/18 1.575 m (5' 2\")   Most Recent Weight: 77.1 kg (170 lb)  IBW: 50 kg (154% IBW)  BMI: Obesity Grade I BMI 30-34.9  Weight History: pt gained ~17 lbs between during her admission to [facility] and edema was noted by RD on 10/11. Difficult to assess " true wt loss given unknown dry wt surrounding CHF. Wt has now dropped below previous admit wt and pt may have lost 23 lbs (12%), true wt loss vs fluid?  Wt Readings from Last 10 Encounters:   10/20/18  10/18/18  10/15/18  10/11/18  10/02/18 77.1 kg (170 lb)  82.2 kg (181 lb 4.8 oz) -- OSH  85.3 kg (188 lb) -- OSH  95.6 kg (210 lb 11.2 oz) -- OSH  87.5 kg (193 lb) -- OSH   09/09/14 70.3 kg (155 lb)     Dosing Weight: 57 kg - adjusted from most recent/driest wt    ASSESSED NUTRITION NEEDS  Estimated Energy Needs: 0213-2381 kcals/day (25 - 30 kcals/kg)  Justification: Maintenance  Estimated Protein Needs: 57-68 grams protein/day (1 - 1.2 grams of pro/kg)  Justification: Increased needs  Estimated Fluid Needs: 1 mL/kcal  Justification: Per provider pending fluid status    PHYSICAL FINDINGS  See malnutrition section below.    MALNUTRITION  % Intake: Decreased intake does not meet criteria  % Weight Loss: Weight loss does not meet criteria - likely d/t fluid  Subcutaneous Fat Loss: None observed  Muscle Loss: None observed  Fluid Accumulation/Edema: None noted  Malnutrition Diagnosis: Patient does not meet two of the above criteria necessary for diagnosing malnutrition    NUTRITION DIAGNOSIS  Predicted inadequate protein-energy intake related to variable appetite and altered taste as evidenced by pt reliant on PO intakes to meet 100% of nutritional needs with potential for variation     INTERVENTIONS  Implementation  Nutrition Education: discussed a mod carb and low Na+ diet and pt reports she is familiar with these diets and follows them at home. She denies any difficulty ordering and denies any education needs at this time. Feels she is eating better and declined BGC. No questions or concerns at this time.      Goals  1. DM med compliant.   2. Patient to consume % of nutritionally adequate meal trays TID, or the equivalent with supplements/snacks.     Monitoring/Evaluation  Progress toward goals will be monitored  and evaluated per protocol.      Shayna Lim RD, LD  Unit Pager: [phone]

## 2018-10-20 NOTE — PLAN OF CARE
Problem: Goal/Outcome  Goal: Goal Outcome Summary  Outcome: Improving  FOCUS/GOAL  Medical management    ASSESSMENT, INTERVENTIONS AND CONTINUING PLAN FOR GOAL:  Patient slept well, no c/o pain. She called for assistance to the bathroom, she transferred and ambulated with a walker and CGA.  She is compliant with sternal precautions. Encouraged her to reposition to her sided to promote coccyx sores healing, she agreed, She was able to turn to her sides with SBA, staff placed pillows behind her back and buttocks. Bed alarm on.

## 2018-10-20 NOTE — CONSULTS
"Diabetes/Hyperglycemia Management Consult    Chief Complaint: uncontrolled type 2 diabetes  Consult requested by: Dr. Shan Merlos  History of Present Illness Ms. Christina Sanchez is a 52 yo woman with a history of uncontrolled type 2 diabetes, HTN, HLD, CHF, CKD, CAD with recent CABG x3 on 10/8/18, post operative course complicated by multiple bilateral cerebral embolic infarctions, she transferred to  [facility] on 10/18/18.  Pt is very particular about her diabetes treatment and reports several adverse reactions to different DM agents in the past (metformin, glipizide, pioglitazone and insulin).  We were consulted to help determine an appropriate regimen for pt to control glucoses.    Christina was diagnosed with type 2 diabetes ~20 years ago.  She denies having complications but per past notes she has severe peripheral neuropathy (she attributes this to adverse reaction to atenolol- not diabetes), diabetic nephropathy, and she admits that she has not had a dilated eye exam in several years.  She has been on metformin in the past and she reports that glucoses stayed in the 200s-300s and it made her \"feel aggressive, my muscles grew, and my chest was tight\". She was on pioglitazone for some of this time and associates these symptoms with that medication as well. She was on glipizide in the past and experienced sudden drops in glucose that would last for several hours.  She has been on insulin in the past and reports: \"I can't think when I am on insulin and I feel horrible... My body feels buzzy\".  She received insulin while in the OR for CABG and reports that she felt horrible after the surgery because of the insulin.  She experienced some visual hallucinations following surgery that she attributes to the insulin.  On review of past notes in chart, she did follow with Dr. Thakkar at [facility] in 2014 and he started her on glargine 10 units, aspart 1unit/15g CHO and medium correction.  Her hemoglobin A1c on this " "regimen dropped from 8.9% to 7.5%.  In Dr. Thakkar's note from 5/28/14 he included the following:     \"She believes that she has a metabolic disorder contributing to her diabetes and hypertension, and that several family members have the same undiagnosed disorder.  I explained to her that even though she did not have suspicion of either Cushing's or a pheochromocytoma, at her request, I screened her for these, both the screens did not suggest presence of these.  I further explained, that I did not believe that she has an undiagnosed metabolic disorder other than uncontrolled type 2 diabetes, hypertension and dyslipidemia, and urged her to use treatment for these.  After a lengthy discussion, she agreed to continue the treatment offered.\"    However, a telephone message in 6/2014 documented that they had a conversation via phone where she explained that she stopped taking insulin because she felt bad while using it.  Dr. Thakkar again reiterated his belief that her symptoms were related to years of untreated diabetes and that they would improve eventually with better glucose control.  She had asked him to check her insulin levels, but he informed her that this would not provide any information to help her symptoms.  She did not return to the clinic after this conversation, per our conversation today she says that her insurance changed, thus she did not follow-up.    Today she repeated much of the same information that she had discussed with Dr. Thakkar in the past.  She continues to believe that she and her family have an undiagnosed disorder that causes hypoglycemia but eventually also leads to hyperglycemia.  Her hypoglycemia is not consistent, she has not had an episode in over 12 months.  Sometimes it seems to occur after she has a meal of simple carbohydrates.  She has no history of gastric bypass surgery.  At other times she has had low blood sugars after eating a burger.  She says that the low blood sugars can last " for several hours even if she eats a large amount of food.  We have no record of these hypoglycemic episodes, and Dr. Thakkar noted in 2014 that she had no hypoglycemic readings on her glucometer.  She stopped taking all diabetes medications in June 2014.  She has checked her blood sugar occasionally since then.  Readings are always in the 200s.  Her hemoglobin A1c on admission to outside hospital was 11.8%, postoperatively (when anemic) it was down to 9.1%.    She did agree to try  Januvia 50 mg daily.  She got the first dose yesterday.  Today her blood sugar in the morning was down to 215, somewhat lower than yesterday morning.  Given her current renal function we cannot go up on the dose of Januvia.  I checked the test claims for Victoza, she does have full coverage.      Christina reports that her appetite is below baseline but is improving now.  She complains that she has been losing a lot of weight since surgery.  She brought up the severe neuropathy she developed after being on atenolol, and says that this had finally improved in the past months, now she feels like it is worse again.      Recent Labs  Lab 10/20/18  0808 10/20/18  0623 10/20/18  0305 10/19/18  2236 10/19/18  1641 10/19/18  1319 10/19/18  1140 10/19/18  0532   GLC  --  254*  --   --   --   --   --  263*   *  --  239* 247* 232* 225* 239*  --          Diabetes Type:   Type 2 Diabetes  Diabetes Duration: ~20 years  Usual Diabetes Regimen:   No medication since 6/2014.    In the past she tried (and did not tolerate):  Metformin  Pioglitazone  Glipizide  Insulin    Occasionally checks glucoses.    Ability to [place] Prescribed Regimen: poor- due to perceived adverse reactions.  Diabetes Control: 11.8% on 10/2/18 (prior to CABG)    Diabetes Complications: overdue for dilated eye exame, peripheral neuropathy, nephropathy  Able to Detect Hypoglycemia: yes- symptomatic with  or less  Usual Diabetes Care Provider: Seen by Dr. Thakkar in the  "remote past- she has not seen anyone for diabetes since 6/2014  Factors Impacting Glucose Control: Pt's perception of adverse reactions to most medications, CKD      Review of Systems  10 point ROS completed with pertinent positives and negatives noted in the HPI    Past medical, family and social histories are reviewed and updated.    Past Medical History  Past Medical History:   Diagnosis Date     Idiopathic progressive polyneuropathy 8/14/2014   Type 2 diabetes- with peripheral neuropathy and nephropathy  HLD  HTN    Family History  Family History   Problem Relation Age of Onset     Neurologic Disorder No family hx of     type 2 diabetes- father, brother, sister    Social History  Social History     Social History     Marital status: Single     Spouse name: N/A     Number of children: N/A     Years of education: N/A     Social History Main Topics     Smoking status: Never Smoker     Smokeless tobacco: Never Used     Alcohol use No     Drug use: No     Sexual activity: Not on file     Other Topics Concern     Parent/Sibling W/ Cabg, Mi Or Angioplasty Before 65f 55m? Yes     Social History Narrative   Christina lives with her significant other in [place]. She has 3 children.  She works in advertising sales.      Physical Exam  /62 (BP Location: Left arm)  Pulse 73  Temp 98.3  F (36.8  C) (Oral)  Resp 16  Ht 1.575 m (5' 2\")  Wt 77.1 kg (170 lb)  SpO2 96%  BMI 31.09 kg/m2    General:  Pleasant woman, sitting up in chair, in no distress.   HEENT: NC/AT, PER and anicteric, non-injected, oral mucous membranes moist.   Lungs: unlabored respiration, no cough  Skin: warm and dry, no obvious lesions  MSK:  fluid movement of all extremities  Lymp: bilateral trace LE edema   Mental status:  alert, oriented x3, communicating clearly  Psych:  Mood is somewhat depressed.    Laboratory  Recent Labs   Lab Test  10/20/18   0623  10/19/18   0532   NA  136  135   POTASSIUM  4.3  4.1   CHLORIDE  99  99   CO2  29  28 "   ANIONGAP  8  8   GLC  254*  263*   BUN  35*  28   CR  1.27*  1.07*   ADIEL  8.5  8.5     CBC RESULTS:   Recent Labs   Lab Test  10/19/18   0532   WBC  8.1   RBC  3.27*   HGB  9.6*   HCT  30.4*   MCV  93   MCH  29.4   MCHC  31.6   RDW  15.4*   PLT  333       Liver Function Studies -   Recent Labs   Lab Test  07/28/14   1658   PROTTOTAL  7.1   ALBUMIN  4.0   BILITOTAL  0.5   ALKPHOS  56   AST  21   ALT  28       Active Medications  Current Facility-Administered Medications   Medication     acetaminophen (TYLENOL) tablet 325-650 mg     amLODIPine (NORVASC) tablet 10 mg     aspirin chewable tablet 81 mg     atorvastatin (LIPITOR) tablet 80 mg     bisacodyl (DULCOLAX) Suppository 10 mg     docusate sodium (COLACE) capsule 100 mg     furosemide (LASIX) tablet 20 mg     hydrALAZINE (APRESOLINE) tablet 10 mg     ipratropium - albuterol 0.5 mg/2.5 mg/3 mL (DUONEB) neb solution 3 mL     losartan (COZAAR) tablet 100 mg     magnesium hydroxide (MILK OF MAGNESIA) suspension 30 mL     metoprolol tartrate (LOPRESSOR) tablet 125 mg     omeprazole (priLOSEC) CR capsule 20 mg     ondansetron (ZOFRAN) tablet 4 mg     Patient is already receiving anticoagulation with heparin, enoxaparin (LOVENOX), warfarin (COUMADIN)  or other anticoagulant medication     polyethylene glycol (MIRALAX/GLYCOLAX) Packet 17 g     potassium chloride SA (K-DUR/KLOR-CON M) CR tablet 10 mEq     sennosides (SENOKOT) tablet 2 tablet     sitagliptin (JANUVIA) tablet 50 mg     warfarin (COUMADIN) tablet 7.5 mg     Warfarin Therapy Reminder (Check START DATE - warfarin may be starting in the FUTURE)     No current outpatient prescriptions on file.       Current Diet    Active Diet Order      Combination Diet Regular Diet Adult; 1738-7277 Calories: Moderate Consistent CHO (4-6 CHO units/meal); 2 gm NA Diet      Assessment   Ms. Christina Sanchez is a 54 yo woman with a history of uncontrolled type 2 diabetes, HTN, HLD, CHF, CKD, CAD with recent CABG x3 on 10/8/18, post  operative course complicated by multiple bilateral cerebral embolic infarctions, she transferred to  [facility] on 10/18/18.    Type 2 diabetes has been untreated for 4+ years.  Pt perceives adverse reactions to many diabetes agents (insulin, metformin, pioglitazone, glipizide).  Also has ongoing belief that she has a metabolic disorder that causes both hypoglycemia and hyperglycemia-- this was addressed by Dr. Thakkar outpatient in the past (see HPI).  Hypoglycemia is quite sporadic (last episode >12mo ago) and there was no record of this on glucometer in the past.  No obvious pattern with meals/food.      Plan  We had a long discussion about the medications she has tried in the past and what she has experienced on these medications.  We discussed the dangers of hypoglycemia but I then discussed the complications of long standing hyperglycemia and pointed out that she has now developed some of these complications (nephropathy, CAD), and she has not experienced hypoglycemia in over 12mo.  We discussed the need for improved glucose control in order for her to heal and decrease risk of infection.    Christina is ok with continuing on sitagliptin (Januvia) 50mg daily for now.  If glucoses are not improved (consistently <200) in the next 1-2 days then I will recommend we try switching to Victoza (she has full coverage with no copay per test claim).  Given recent CABG, and her nephropathy, this would be a good option that would likely be more effective than Januvia.    Continue monitoring BG ac, hs and 0200.  No ssi insulin-- pt refuses to be on any insulin at this time.    We will continue to follow.    Beatriz Collazo PA-C [phone]  Diabetes Management Team Job Code [id number]  I spent a total of 80 minutes bedside and on the inpatient unit managing the glycemic care of Christina Sanchez. Over 50% of my time on the unit was spent counseling the patient and/or coordinating care regarding treatment for uncontrolled type 2 diabetes in context  of recent CABG and stroke, and pt reporting many interactions with past medications.  See note for details.

## 2018-10-20 NOTE — PLAN OF CARE
Problem: Patient Care Overview  Goal: Plan of Care/Patient Progress Review  Compelted remainder of the complex reading comprehension portion of eval- pt at 1005 accuracy at paragraph level- takes a long time to read- but appears to b 2/2 that pt's glasses are broken and so vision impacting. Completed a written language assessment- generating sentences- 6/6 - 100% accuracy. Completed a mod to complex level problem solving- reordering letters to unscrmable words- pt 100% accuracy - able to complete independently. Completed a visual recall task- recall of a pictured scene- pt at 8/10 accuracy

## 2018-10-20 NOTE — PLAN OF CARE
"Problem: Patient Care Overview  Goal: Plan of Care/Patient Progress Review  Discharge Planner PT   Patient plan for discharge: home with intermittent assist at OP PT. Pt has 4WW to use at home.  Current status: STS with 4WW at [facility], amb 150+ ft with 4WW and B AFOs at SBA. Up/down 12x6\" steps with B rails at CGA. Need to progress independence with STSs and stairs.  Barriers to return to prior living situation: independence with stairs, STS consistently from home surfaces  Recommendations for discharge: continue to improve BLE strength, endurance, and technique with transfers to be able to safely navigate room at [facility] mod I before returning home.  Rationale for recommendations: to safely navigate home environment with 4WW and B AFOs       Entered by: Alirio Lynch 10/20/2018 5:11 PM           "

## 2018-10-21 LAB
GLUCOSE BLDC GLUCOMTR-MCNC: 226 MG/DL (ref 70–99)
GLUCOSE BLDC GLUCOMTR-MCNC: 227 MG/DL (ref 70–99)
GLUCOSE BLDC GLUCOMTR-MCNC: 249 MG/DL (ref 70–99)
GLUCOSE BLDC GLUCOMTR-MCNC: 263 MG/DL (ref 70–99)
INR PPP: 2.38 (ref 0.86–1.14)

## 2018-10-21 PROCEDURE — 97127 ZZHC SP THERAPEUTIC INTERVENTION W/FOCUS ON COGNITIVE FUNCTION,EA 15 MIN: CPT | Mod: GN | Performed by: SPEECH-LANGUAGE PATHOLOGIST

## 2018-10-21 PROCEDURE — 00000146 ZZHCL STATISTIC GLUCOSE BY METER IP

## 2018-10-21 PROCEDURE — 12800006 ZZH R&B REHAB

## 2018-10-21 PROCEDURE — 97530 THERAPEUTIC ACTIVITIES: CPT | Mod: GP

## 2018-10-21 PROCEDURE — 85610 PROTHROMBIN TIME: CPT | Performed by: PHYSICAL MEDICINE & REHABILITATION

## 2018-10-21 PROCEDURE — 97535 SELF CARE MNGMENT TRAINING: CPT | Mod: GO

## 2018-10-21 PROCEDURE — 25000132 ZZH RX MED GY IP 250 OP 250 PS 637: Performed by: PHYSICAL MEDICINE & REHABILITATION

## 2018-10-21 PROCEDURE — 97110 THERAPEUTIC EXERCISES: CPT | Mod: GP

## 2018-10-21 PROCEDURE — 40000225 ZZH STATISTIC SLP WARD VISIT: Performed by: SPEECH-LANGUAGE PATHOLOGIST

## 2018-10-21 PROCEDURE — 36415 COLL VENOUS BLD VENIPUNCTURE: CPT | Performed by: PHYSICAL MEDICINE & REHABILITATION

## 2018-10-21 PROCEDURE — 40000133 ZZH STATISTIC OT WARD VISIT

## 2018-10-21 PROCEDURE — 97110 THERAPEUTIC EXERCISES: CPT | Mod: GO

## 2018-10-21 PROCEDURE — 40000193 ZZH STATISTIC PT WARD VISIT

## 2018-10-21 RX ORDER — WARFARIN SODIUM 5 MG/1
5 TABLET ORAL
Status: COMPLETED | OUTPATIENT
Start: 2018-10-21 | End: 2018-10-21

## 2018-10-21 RX ORDER — LIRAGLUTIDE 6 MG/ML
0.6 INJECTION SUBCUTANEOUS DAILY
Status: DISCONTINUED | OUTPATIENT
Start: 2018-10-22 | End: 2018-10-25 | Stop reason: HOSPADM

## 2018-10-21 RX ADMIN — ATORVASTATIN CALCIUM 80 MG: 80 TABLET, FILM COATED ORAL at 20:59

## 2018-10-21 RX ADMIN — METOPROLOL TARTRATE 125 MG: 50 TABLET, FILM COATED ORAL at 20:59

## 2018-10-21 RX ADMIN — ASPIRIN 81 MG CHEWABLE TABLET 81 MG: 81 TABLET CHEWABLE at 07:47

## 2018-10-21 RX ADMIN — METOPROLOL TARTRATE 125 MG: 50 TABLET, FILM COATED ORAL at 07:47

## 2018-10-21 RX ADMIN — LOSARTAN POTASSIUM 100 MG: 100 TABLET, FILM COATED ORAL at 07:47

## 2018-10-21 RX ADMIN — WARFARIN SODIUM 5 MG: 5 TABLET ORAL at 17:05

## 2018-10-21 RX ADMIN — SITAGLIPTIN 50 MG: 50 TABLET, FILM COATED ORAL at 07:47

## 2018-10-21 RX ADMIN — AMLODIPINE BESYLATE 10 MG: 10 TABLET ORAL at 07:47

## 2018-10-21 NOTE — PLAN OF CARE
Problem: Goal/Outcome  Goal: Goal Outcome Summary  FOCUS/GOAL  Medical management    ASSESSMENT, INTERVENTIONS AND CONTINUING PLAN FOR GOAL:  Patient is alert and oriented, but forgetful. She utilizes the call light appropriately for her needs. Patient transfers with CGA and a walker. She is continent of bowel and bladder and did have a bowel movement today. This morning, patient had a blood pressure of 169/75 prior to morning medications. Upon re-check, /59. Patient believes that this blood pressure is too low for her, and she feels dizzy when her SBP is in the 130s. Blood glucose this morning was 226 and it was 263 prior to lunch. Patient received her Januvia this morning, but did meet with Endo NP again today. Plan will be to discontinue Januvia and start Victoza tomorrow; patient is agreeable to this plan. Patient's sternal incision and drain sites are open to air and healing. She denies any discomfort to areas. She denies overall pain as well. Patient will require MAP prior to discharge, as well as Diabetic teaching. She has not called for her blood glucose to be obtained; nursing staff has monitored when meals arrive. Victoza will be a new medication for her as well. Patient remains on sternal precautions and does maintain this well with her pillow. Staff will continue with plan of care.

## 2018-10-21 NOTE — PROGRESS NOTES
Diabetes Consult Daily  Progress Note          Assessment/Plan:     Ms. Christina Sanchez is a 52 yo woman with a history of uncontrolled type 2 diabetes, HTN, HLD, CHF, CKD, CAD with recent CABG x3 on 10/8/18, post operative course complicated by multiple bilateral cerebral embolic infarctions, she transferred to  [facility] on 10/18/18.  Pt refuses to take several diabetic agents (including insulin) because of past adverse reactions.    BG in the low to mid 200s on Januvia 50mg daily.  Renal impairment inhibits further dose increase.    Plan:  -sitagliptin (Januvia) 50mg today- then discontinue  -liraglutide (Victoza) 0.6mg daily to start tomorrow.  -monitor glucose ac, hs and 0200       Outpatient diabetes follow up: TBD-- recommend she establish with PCP who can regularly follow for diabetes.  Plan discussed with patient, bedside RN.           Interval History:     The last 24 hours progress and nursing notes reviewed.  Christina continues to feel better and appetite is improving.  She is tolerating Januvia 50mg and wonders if dose should be increased to 100mg.  I re-explained that given current GFR (44) we need to keep her at the 50mg dose.  BG are running low to mid 200s lately- so only slight improvement with Januvia.  We discussed Victoza and GLP-1 agonists in detail (how they work, common adverse effects), and I explained that I think this would be more effective at lowering her glucose than Januvia and doesn't need to be renally dosed.  Also the LEADER study showed that Victoza can have cardiovascular and renal benefits for diabetics.  Christina is interested in trying this- she would like to start tomorrow.  Test claim yesterday showed 100% coverage with no copay.  She has not personal history of pancreatitis and no personal or family history of thyroid cancer.    PTA Regimen:  No medication since 6/2014.    In the past she tried (and did not  "tolerate):  Metformin  Pioglitazone  Glipizide  Insulin     Occasionally checks glucoses.      Recent Labs  Lab 10/20/18  2054 10/20/18  1709 10/20/18  0808 10/20/18  0623 10/20/18  0305 10/19/18  2236 10/19/18  1641  10/19/18  0532   GLC  --   --   --  254*  --   --   --   --  263*   * 208* 218*  --  239* 247* 232*  < >  --    < > = values in this interval not displayed.            Review of Systems:   See interval hx          Medications:       Active Diet Order      Combination Diet Regular Diet Adult; 4186-6001 Calories: Moderate Consistent CHO (4-6 CHO units/meal); 2 gm NA Diet    Physical Exam:  Gen: Alert, sitting up in chair, in NAD   HEENT: NC/AT, mucous membranes are moist  Resp: Unlabored  Neuro:oriented x3, communicating clearly  /75 (BP Location: Left arm)  Pulse 63  Temp 98  F (36.7  C) (Oral)  Resp 12  Ht 1.575 m (5' 2\")  Wt 82.1 kg (181 lb)  SpO2 93%  BMI 33.11 kg/m2           Data:   No results found for: A1C           CBC RESULTS:   Recent Labs   Lab Test  10/19/18   0532   WBC  8.1   RBC  3.27*   HGB  9.6*   HCT  30.4*   MCV  93   MCH  29.4   MCHC  31.6   RDW  15.4*   PLT  333     Recent Labs   Lab Test  10/20/18   0623  10/19/18   0532   NA  136  135   POTASSIUM  4.3  4.1   CHLORIDE  99  99   CO2  29  28   ANIONGAP  8  8   GLC  254*  263*   BUN  35*  28   CR  1.27*  1.07*   ADIEL  8.5  8.5     GFR Estimate   Date Value Ref Range Status   10/20/2018 44 (L) >60 mL/min/1.7m2 Final     Comment:     Non  GFR Calc   10/19/2018 54 (L) >60 mL/min/1.7m2 Final     Comment:     Non  GFR Calc   07/28/2014 >90 >60 mL/min/1.7m2 Final     Liver Function Studies -   Recent Labs   Lab Test  07/28/14   1658   PROTTOTAL  7.1   ALBUMIN  4.0   BILITOTAL  0.5   ALKPHOS  56   AST  21   ALT  28     Lab Results   Component Value Date    INR 2.38 10/21/2018    INR 1.75 10/20/2018    INR 1.63 10/19/2018     I spent a total of 35 minutes bedside and on the inpatient unit " managing the glycemic care of Christina Bartholomewy. Over 50% of my time on the unit was spent counseling the patient and/or coordinating care regarding glucose management- discussing Januvia and Victoza in detail.  See note for details.    Beatriz Collazo PA-C [phone]  Diabetes Management job code [id number]

## 2018-10-21 NOTE — PLAN OF CARE
Problem: Goal/Outcome  Goal: Goal Outcome Summary  FOCUS/GOAL  Medication management, Pain management and Skin integrity    ASSESSMENT, INTERVENTIONS AND CONTINUING PLAN FOR GOAL:  Pt alert and oriented x4, no complaints of nausea or SOB. Pt ambulating with CGA and walker. Pt asking about her Januvia dosage she is receiving and wondering if it needs to be increased to better manage her diabetes. Nurse left sticky note for MD to assess this with pt. Pt stating she has some soreness to her neck, heat pack given with good effect. Pt's coccyx is red and blanchable with dry skin, barrier cream applied and pt encouraged to reposition and offered assistance if needed. BP elevated to 165 systolic prior to receiving scheduled metoprolol, recheck 1 hour later it was 142 systolic.

## 2018-10-21 NOTE — PLAN OF CARE
Problem: Goal/Outcome  Goal: Goal Outcome Summary  Outcome: Improving  FOCUS/GOAL  Medical management    ASSESSMENT, INTERVENTIONS AND CONTINUING PLAN FOR GOAL:  Patient slept well, she called for assistance to the bathroom at 0430, she need minimal assistance from Lying to sitting at the edge of the bed, she needs both legs lifted up in bed due to sternal precautions, she is compliant. She stood up and ambulated with CGA.  No c/o pain. Bed alarm is on.

## 2018-10-21 NOTE — PLAN OF CARE
Problem: Patient Care Overview  Goal: Plan of Care/Patient Progress Review  Outcome: Therapy, progress toward functional goals as expected  OT: pt was CGA for functional mobility with 4WW, pt reported feeling light headed during session but otherwise unsymptomatic.

## 2018-10-21 NOTE — PLAN OF CARE
Problem: Patient Care Overview  Goal: Plan of Care/Patient Progress Review  Completed deductive reasoning puzzle-- pt able to compelte with increased time and occasaional cues to aid in attention to detail with this complex task; Compelted complex numerical reasoning- word porblems - some occasional  cues to aid in setting up the problem. Compelted memory recallt ask involving learning 12 new words- when attempted to recall them randomly- pt recalled with 8/12 accuracy but then when reordering the words into categories- pt able to recall with 111/2 accuracy

## 2018-10-21 NOTE — PROGRESS NOTES
"PM&R PROGRESS NOTE     Patient seen and examined today.  Coordinated with team.      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    Now on Aspirin and coumadin.   Appreciate Endo note and recommendations. Blood sugars are in 200 range.       Functionally, Christina Sanchez is participating in the therapies in a motivated fashion. She is making good progress. She is CGA with transfers with nursing. Amb with WW going upto 300 feet with rest breaks. Her assistance is variable from min assist to CGA for transfers and mobility.     She reports that at baseline, she tends to be hypertensive. Reviewed blood pressures, she ranges from 130-150/50-70. Continue amlodipine, losartan, and metoprolol.       REVIEW OF THE SYSTEMS   Total of ten systems reviewed, pertinent positives and negatives as follows  Denies chest pain fever chills rigors  Denies any shortness of breath   Denies any nausea or vomiting   Appetite good, drank orange juice this morning   Denies any lightheadedness or dizziness   On regular diet with thins  Denies any new rashes    Slept well last night   Remainder of the review of the systems was negative      Physical exam    Vital signs:  Temp: 98  F (36.7  C) Temp src: Oral BP: 138/59 Pulse: 63 Heart Rate: 70 Resp: 12 SpO2: 93 % O2 Device: None (Room air)   Height: 157.5 cm (5' 2\") Weight: 82.1 kg (181 lb)  Estimated body mass index is 33.11 kg/(m^2) as calculated from the following:    Height as of this encounter: 1.575 m (5' 2\").    Weight as of this encounter: 82.1 kg (181 lb).  HEENT NC AT PRTL EOM good   Neck supple  Heart S1S2  Lungs CTA  Abdomen  benign BS positive NT NR   LE no edema            REVIEWED THE MEDICATIONS BELOW   Current Facility-Administered Medications   Medication     acetaminophen (TYLENOL) tablet 325-650 mg     amLODIPine (NORVASC) tablet 10 mg     aspirin " chewable tablet 81 mg     atorvastatin (LIPITOR) tablet 80 mg     bisacodyl (DULCOLAX) Suppository 10 mg     docusate sodium (COLACE) capsule 100 mg     hydrALAZINE (APRESOLINE) tablet 10 mg     ipratropium - albuterol 0.5 mg/2.5 mg/3 mL (DUONEB) neb solution 3 mL     [START ON 10/22/2018] liraglutide (VICTOZA) injection 0.6 mg     losartan (COZAAR) tablet 100 mg     magnesium hydroxide (MILK OF MAGNESIA) suspension 30 mL     metoprolol tartrate (LOPRESSOR) tablet 125 mg     omeprazole (priLOSEC) CR capsule 20 mg     ondansetron (ZOFRAN) tablet 4 mg     Patient is already receiving anticoagulation with heparin, enoxaparin (LOVENOX), warfarin (COUMADIN)  or other anticoagulant medication     polyethylene glycol (MIRALAX/GLYCOLAX) Packet 17 g     sennosides (SENOKOT) tablet 2 tablet     warfarin (COUMADIN) tablet 5 mg     Warfarin Therapy Reminder (Check START DATE - warfarin may be starting in the FUTURE)       Results for orders placed or performed during the hospital encounter of 10/18/18 (from the past 24 hour(s))   Glucose by meter   Result Value Ref Range    Glucose 208 (H) 70 - 99 mg/dL   Glucose by meter   Result Value Ref Range    Glucose 235 (H) 70 - 99 mg/dL   INR   Result Value Ref Range    INR 2.38 (H) 0.86 - 1.14       Total of 25 min spent in the encounter, more than 50% in coordination and counseling on topics listed in the HPI

## 2018-10-22 LAB
ANION GAP SERPL CALCULATED.3IONS-SCNC: 7 MMOL/L (ref 3–14)
BUN SERPL-MCNC: 35 MG/DL (ref 7–30)
CALCIUM SERPL-MCNC: 8.5 MG/DL (ref 8.5–10.1)
CHLORIDE SERPL-SCNC: 103 MMOL/L (ref 94–109)
CO2 SERPL-SCNC: 27 MMOL/L (ref 20–32)
CREAT SERPL-MCNC: 1.03 MG/DL (ref 0.52–1.04)
GFR SERPL CREATININE-BSD FRML MDRD: 56 ML/MIN/1.7M2
GLUCOSE BLDC GLUCOMTR-MCNC: 260 MG/DL (ref 70–99)
GLUCOSE BLDC GLUCOMTR-MCNC: 281 MG/DL (ref 70–99)
GLUCOSE SERPL-MCNC: 243 MG/DL (ref 70–99)
INR PPP: 2.6 (ref 0.86–1.14)
POTASSIUM SERPL-SCNC: 3.8 MMOL/L (ref 3.4–5.3)
SODIUM SERPL-SCNC: 137 MMOL/L (ref 133–144)

## 2018-10-22 PROCEDURE — 97110 THERAPEUTIC EXERCISES: CPT | Mod: GP

## 2018-10-22 PROCEDURE — 80048 BASIC METABOLIC PNL TOTAL CA: CPT | Performed by: PHYSICAL MEDICINE & REHABILITATION

## 2018-10-22 PROCEDURE — 25000125 ZZHC RX 250: Performed by: PHYSICIAN ASSISTANT

## 2018-10-22 PROCEDURE — 40000193 ZZH STATISTIC PT WARD VISIT

## 2018-10-22 PROCEDURE — 36415 COLL VENOUS BLD VENIPUNCTURE: CPT | Performed by: PHYSICAL MEDICINE & REHABILITATION

## 2018-10-22 PROCEDURE — 85610 PROTHROMBIN TIME: CPT | Performed by: PHYSICAL MEDICINE & REHABILITATION

## 2018-10-22 PROCEDURE — 25000132 ZZH RX MED GY IP 250 OP 250 PS 637: Performed by: PHYSICAL MEDICINE & REHABILITATION

## 2018-10-22 PROCEDURE — 40000133 ZZH STATISTIC OT WARD VISIT

## 2018-10-22 PROCEDURE — 00000146 ZZHCL STATISTIC GLUCOSE BY METER IP

## 2018-10-22 PROCEDURE — 12800006 ZZH R&B REHAB

## 2018-10-22 PROCEDURE — 40000225 ZZH STATISTIC SLP WARD VISIT

## 2018-10-22 PROCEDURE — 97535 SELF CARE MNGMENT TRAINING: CPT | Mod: GO

## 2018-10-22 PROCEDURE — 97530 THERAPEUTIC ACTIVITIES: CPT | Mod: GP

## 2018-10-22 PROCEDURE — 97127 ZZHC SP THERAPEUTIC INTERVENTION W/FOCUS ON COGNITIVE FUNCTION,EA 15 MIN: CPT | Mod: GN

## 2018-10-22 RX ORDER — WARFARIN SODIUM 5 MG/1
5 TABLET ORAL
Status: COMPLETED | OUTPATIENT
Start: 2018-10-22 | End: 2018-10-22

## 2018-10-22 RX ADMIN — LIRAGLUTIDE 0.6 MG: 6 INJECTION SUBCUTANEOUS at 09:20

## 2018-10-22 RX ADMIN — METOPROLOL TARTRATE 125 MG: 50 TABLET, FILM COATED ORAL at 21:09

## 2018-10-22 RX ADMIN — LOSARTAN POTASSIUM 100 MG: 100 TABLET, FILM COATED ORAL at 08:12

## 2018-10-22 RX ADMIN — AMLODIPINE BESYLATE 10 MG: 10 TABLET ORAL at 08:11

## 2018-10-22 RX ADMIN — ATORVASTATIN CALCIUM 80 MG: 80 TABLET, FILM COATED ORAL at 21:08

## 2018-10-22 RX ADMIN — ASPIRIN 81 MG CHEWABLE TABLET 81 MG: 81 TABLET CHEWABLE at 08:11

## 2018-10-22 RX ADMIN — WARFARIN SODIUM 5 MG: 5 TABLET ORAL at 18:26

## 2018-10-22 RX ADMIN — METOPROLOL TARTRATE 125 MG: 50 TABLET, FILM COATED ORAL at 08:11

## 2018-10-22 NOTE — PROGRESS NOTES
"  [facility]   Acute Rehabilitation Unit  Daily progress note    INTERVAL HISTORY  Over the weekend Lasix was discontinued as creatinine had increased to 1.27.  This morning is improved to 1.03.  She says her blood pressure is \"low\" for her at 130s/60s and when this happens her \"body gets cold and she can't think\".  Symptoms are improved if systolic is in the 140s.  I explained to her that targeting 140/90 or below is ideal to prevent another stroke, and hypertension is in fact the number one risk factor.  At this time she denies any shortness of breath, but says she has had some intermittent episodes.  She has been refusing to take Prilosec.  She denies any GI history and was not taking it prior to admission.  She tells me she is eating well.             MEDICATIONS  Scheduled:    amLODIPine  10 mg Oral Daily     aspirin  81 mg Oral Daily     atorvastatin  80 mg Oral At Bedtime     liraglutide  0.6 mg Subcutaneous Daily     losartan  100 mg Oral Daily     metoprolol tartrate  125 mg Oral BID     omeprazole  20 mg Oral QAM AC        PRN:  acetaminophen, bisacodyl, docusate sodium, hydrALAZINE, ipratropium - albuterol 0.5 mg/2.5 mg/3 mL, magnesium hydroxide, ondansetron, - MEDICATION INSTRUCTIONS -, polyethylene glycol, sennosides, Warfarin Therapy Reminder       PHYSICAL EXAM  Patient Vitals for the past 24 hrs:   BP Temp Temp src Pulse Resp SpO2 Weight   10/22/18 0811 145/62 - - 70 - - -   10/22/18 0621 152/70 97.7  F (36.5  C) Oral 69 18 92 % -   10/22/18 0410 - - - - - 98 % 83.4 kg (183 lb 14.4 oz)   10/21/18 2058 149/69 - - - - - -   10/21/18 1605 147/64 97.2  F (36.2  C) Oral 68 16 96 % -   10/21/18 1057 138/59 - - - - - -       GEN: NAD, flat affect  HEENT: NC/AT  CVS: RRR, S1+S2, no m/r/g.  +Sternal incision healing well.  PULM: CTA b/l.  No crackles auscultated.  ABD: Soft, NT, ND, bowel sounds present.  +Umbilical hernia present.  EXT: No LE edema or calf tenderness " b/l  Neuro: Answers appropriately, follows commands    LABS  CBC RESULTS:   Recent Labs   Lab Test  10/19/18   0532   WBC  8.1   RBC  3.27*   HGB  9.6*   HCT  30.4*   MCV  93   MCH  29.4   MCHC  31.6   RDW  15.4*   PLT  333       Last Comprehensive Metabolic Panel:  Sodium   Date Value Ref Range Status   10/22/2018 137 133 - 144 mmol/L Final     Potassium   Date Value Ref Range Status   10/22/2018 3.8 3.4 - 5.3 mmol/L Final     Chloride   Date Value Ref Range Status   10/22/2018 103 94 - 109 mmol/L Final     Carbon Dioxide   Date Value Ref Range Status   10/22/2018 27 20 - 32 mmol/L Final     Anion Gap   Date Value Ref Range Status   10/22/2018 7 3 - 14 mmol/L Final     Glucose   Date Value Ref Range Status   10/22/2018 243 (H) 70 - 99 mg/dL Final     Urea Nitrogen   Date Value Ref Range Status   10/22/2018 35 (H) 7 - 30 mg/dL Final     Creatinine   Date Value Ref Range Status   10/22/2018 1.03 0.52 - 1.04 mg/dL Final     GFR Estimate   Date Value Ref Range Status   10/22/2018 56 (L) >60 mL/min/1.7m2 Final     Comment:     Non  GFR Calc     Calcium   Date Value Ref Range Status   10/22/2018 8.5 8.5 - 10.1 mg/dL Final     INR   Date Value Ref Range Status   10/22/2018 2.60 (H) 0.86 - 1.14 Final          Recent Labs  Lab 10/22/18  0551 10/21/18  2119 10/21/18  1709 10/21/18  1235 10/21/18  0750 10/20/18  2054 10/20/18  1709  10/20/18  0623  10/19/18  0532   *  --   --   --   --   --   --   --  254*  --  263*   BGM  --  227* 249* 263* 226* 235* 208*  < >  --   < >  --    < > = values in this interval not displayed.    ASSESSMENT  Christina Sanchez is a 53 year old RHD womanwith a PMH including but not limited to HTN, uncontrolled diabetes (HbA1c 9.1), CAD, CHF, bilateral foot drop, and medication non-compliance who presented to [facility] on 10/1/18 for shortness of breath, chest pain, productive cough, and nausea, and found to be in CHF exacerbation and hypertensive emergency.  Hospital  course was complicated by UTI and bacteremia, and found NSTEMI requiring transfer to [facility] for further treatment and workup.  Found to have 2 vessel disease, s/p CABG which was complicated by embolic CVA.  Now on Aspirin and coumadin.       Impairment group code: 01.4 No Paresis, bilateral hemispheric strokes following CABG x3      PLAN  Rehabilitation  -Continue inpatient rehabilitation including PT, OT, and speech therapies.  -Needs multidisciplinary approach including therapies, rehab nursing, social work, and physiatry.    Medical  1)Neurology  -Bilateral multiple hemispheric CVAs due to embolism in the setting of CABG                         -MRI in 6-8 weeks to follow up evolution of strokes                         -Follow up with neurology after discharge                         -ASA 81 mg and Coumadin (goal 2-3) for secondary stroke prevention.  In addition will provide ongoing education regarding risk factor modification including controlling HTN and DM, medication compliance, lifestyle and diet changes, exercise, and physician follow up  -L arm numbness                         -EMG as an outpatient.  MRI shows stenosis at the C5-6 level, however her symptoms are in the ulnar or C8/T1 distribution     2)Cardiology  -CAD s/p NSTEMI and CABG x3                         -ASA 81 mg daily                         -Sternal precautions 3 months, no lifting >10 lbs for 6-8 weeks                         -May wash incision with soap                         -Follow up with CV surgery (Olga Lidia Mcclendon NP, appointment on 11?/3)  -HTN/CHF                         -Norvasc 10 mg daily, Cozaar 100 mg daily, Metoprolol 125 mg BID                         -Lasix discontinued due to increasing creatinine.  Today is back down to 1.03.  She has reported intermittent dyspnea, but denies any at the current time.  She also expresses her concern or lowering her BP as she gets subjective symptoms.  Education provided for risk factor  modification including BP control.  We will target 140 systolic for her.  No crackles auscultated in lungs and no LE edema on exam.  Given not fluid overloaded on exam and her concern for lowering BP, will hold off from re-starting Lasix at this time.  Can re-start if needed at a later time if needed.                         -Hydralazine PRN for SBP >160 mmHg  -HLD: Lipitor 80 mg daily  -Follow up with cardiology made on December 6 with Dr. Cantrell     3)Pulm  -Acute respiratory failure post op due to volume overload, now off supplemental oxygen.  Monitor respiratory and volume status.  -Chest tubes removed 10/5  -Duonebs q4h PRN  -Encourage incentive spirometry     4)FENGI  -Diet: Diabetic/Cardiac, regular consistency solids and thin liquids  -Monitor sodium and potassium levels  -Bowel meds PRN  -Zofran PRN  -Discontinue Prilosec.  She denies any GI history and was not taking previously.      5)  -PVRs WNL, may discontinue checks.  -Prior UTI and bacteremia, now completed treatment  -Prior acute on chronic kidney injury, Creatinine slowly increasing again.  Will re-check BMP tomorrow and if continues to rise will discontinue Lasix.     6)DVT prophylaxis  -On Coumadin for embolic CVA, pharmacy to assist with INR monitoring (goal 2-3) and dosing  -INR now therapeutic at 2.60 this morning    7)Pain  -Has not complained of pain, continue Tylenol PRN     8)Endocrine  -Uncontrolled DM  -Endocrine assistance greatly appreciated.  Januvia now discontinued and Liraglutide (Victoza) is now started.  Continue to monitor glucose levels, but not covering with sliding scale due to patient refusal.     9)ID  -Prior bacteremia and UTI, now completed treatment  -Monitor for signs of infection     10)Psych  -Has expressed depression and difficulty coping.  Have asked health psychology to evaluate.     11)Heme  -Acute blood loss anemia post-operatively, Hgb 9.6 on 10/19.  Overall stable.  -No evidence of active bleeding, monitor  peripherally     12)MSK  -Umbilical hernia: Needs follow up with general surgery (Dr. Ambrocio) for repair in 1-2 months     13)Dispo:  -Anticipate discharge home  -Goals: Modified independent for ambulation with walker, and Mod I for all ADLs  -Rehab Prognosis: Good  -ELOS: 7 days     Code status: Full (discussed with patient)     Shan Merlos MD  Department of Rehabilitation Medicine  Pager: [phone]      Time Spent on this Encounter   I, Shan Merlos, spent a total of 35 minutes bedside and on the inpatient unit today managing the care of Christina Sanchez.  Over 50% of my time on the unit was spent counseling the patient and /or coordinating care regarding blood pressure, modification of stroke risk factors, medication side effects, pulmonary symptoms, medications, kidney function, and medical and functional progress. See note for details.

## 2018-10-22 NOTE — PLAN OF CARE
Problem: Goal/Outcome  Goal: Goal Outcome Summary  Outcome: Improving  FOCUS/GOAL  Medical management    ASSESSMENT, INTERVENTIONS AND CONTINUING PLAN FOR GOAL:  Patient slept until around 0400 when she called for assistance to the bathroom. She transferred in and out of bed with supervision, compliant with sternal precaution, She ambulated to and from the bathroom, transferred to toilet with a walker and CGA. Transferred off the toilet, performed perineal hygiene, clothing management with supervision. After transferring to bed, she said she felt bloated, dry mouth. Hands and feet are swollen, occasional SOB, O2 sat % on room air. 2 lbs weight gain since yesterday, no cough. She asked if she was taking Lasix, she is not.

## 2018-10-22 NOTE — PLAN OF CARE
Problem: Patient Care Overview  Goal: Plan of Care/Patient Progress Review    Patient engaged in variety of moderate to complex problem solving tasks. Patient had mild to moderate difficulties completing tasks and needed intermittent clinician support. Noted that during tasks, patient was unaware of errors but was able to self-correct errors when told there were errors on the task. Patient able to verbalize different strategies for tasks that she found beneficial.  Patient stated that she noticed these types of tasks are more challenging now, compared to her baseline.

## 2018-10-22 NOTE — PROGRESS NOTES
"                          Diabetes Consult Daily  Progress Note          Assessment/Plan:     Ms. Christina Sanchez is a 54 yo woman with a history of uncontrolled type 2 diabetes, HTN, HLD, CHF, CKD, CAD with recent CABG x3 on 10/8/18, post operative course complicated by multiple bilateral cerebral embolic infarctions, she transferred to  ARU on 10/18/18.  Pt refuses to take several diabetic agents (including insulin) because of past adverse reactions.    BG staying in the 200s on sitaglitpin, we will try switching to liraglutide today (pt ok with this switch- discussed GLP-1 agonists with pt at length on 10/21).    Plan:  -sitagliptin discontinued  -liraglutide (Victoza) 0.6mg daily starting today.  -monitor glucose ac, hs and 0200       Outpatient diabetes follow up: TBD-- recommend she establish with PCP who can regularly follow for diabetes.  Plan discussed with patient, bedside RN.           Interval History:     The last 24 hours progress and nursing notes reviewed.  Christina reports that she is feeling pretty good \"so far\" today.  Appetite is good.  She was willing to switch from Januvia to Victoza today.  Glucoses on Januvia 50mg stayed in the low to high 200s- only minimal improvement at times from baseline.  We did not go up on dose b/c GFR was in the 40s over weekend.  Today creatinine is improved.  She has 100% coverage for Victoza.      PTA Regimen:  No medication since 6/2014.    In the past she tried (and did not tolerate):  Metformin  Pioglitazone  Glipizide  Insulin     Occasionally checks glucoses.      Recent Labs  Lab 10/22/18  1235 10/22/18  0818 10/22/18  0551 10/21/18  2119 10/21/18  1709 10/21/18  1235 10/21/18  0750  10/20/18  0623  10/19/18  0532   GLC  --   --  243*  --   --   --   --   --  254*  --  263*   * 260*  --  227* 249* 263* 226*  < >  --   < >  --    < > = values in this interval not displayed.            Review of Systems:   See interval hx          Medications:       Active " "Diet Order      Combination Diet Regular Diet Adult; [id number] Calories: Moderate Consistent CHO (4-6 CHO units/meal); 2 gm NA Diet    Physical Exam:  Gen: Alert, sitting up in chair, in NAD   HEENT: NC/AT, mucous membranes are moist  Resp: Unlabored  Neuro:oriented x3, communicating clearly  /62 (BP Location: Left arm)  Pulse 70  Temp 97.7  F (36.5  C) (Oral)  Resp 18  Ht 1.575 m (5' 2\")  Wt 83.4 kg (183 lb 14.4 oz)  SpO2 92%  BMI 33.64 kg/m2           Data:   No results found for: A1C         Recent Labs   Lab Test  10/22/18   0551  10/20/18   0623   NA  137  136   POTASSIUM  3.8  4.3   CHLORIDE  103  99   CO2  27  29   ANIONGAP  7  8   GLC  243*  254*   BUN  35*  35*   CR  1.03  1.27*   ADIEL  8.5  8.5     CBC RESULTS:   Recent Labs   Lab Test  10/19/18   0532   WBC  8.1   RBC  3.27*   HGB  9.6*   HCT  30.4*   MCV  93   MCH  29.4   MCHC  31.6   RDW  15.4*   PLT  333       Beatriz Collazo PA-C [phone]  Diabetes Management job code 0243              "
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
## 2018-10-22 NOTE — PLAN OF CARE
Problem: Goal/Outcome  Goal: Goal Outcome Summary  FOCUS/GOAL  Medication management and Medical management    ASSESSMENT, INTERVENTIONS AND CONTINUING PLAN FOR GOAL:  Patient is alert and oriented and able to make her needs known. She is now modified independent with her rollator in her room. She is continent of bowel and bladder and did have a bowel movement today. This morning, it was noted that patient gained two pounds and was reporting increased shortness of breath. Patient has non-pitting edema to BLE. Patient declined compression stockings, but was encouraged to elevate BLE throughout the day. Of note, Lasix was discontinued over the weekend. Upon auscultation, patient's lungs clear bilaterally throughout, but diminished in LLL. Writer left note for provider, who did meet with patient. Lasix not re-started, but patient was encouraged to let staff know if symptoms worsened. Prilosec discontinued this shift per patient request. Patient's first dose of Victoza was administered this morning. Blood glucose prior to breakfast was 260 and it was 281 prior to lunch. Patient will need to practice obtaining own blood glucose with her glucometer, which is located in her room. Writer will pass onto oncoming shift and also encouraged patient to do so. Patient will start MAP tomorrow, 10/23. Writer provided medication list and explained process to patient. Writer provided education on Coumadin and INR; patient has yet to participate in Coumadin PLC. Incision to sternum is open to air, as well as drain sites. Patient has denied any difficulty with pain or discomfort this shift. Staff will continue to provide discharge education in relation to medications and Diabetes management.

## 2018-10-22 NOTE — PLAN OF CARE
Problem: Patient Care Overview  Goal: Plan of Care/Patient Progress Review  OT: pt is mod I using rollator. Updated board and requested orders. Alarms only at night time.

## 2018-10-22 NOTE — PLAN OF CARE
Problem: Goal/Outcome  Goal: Goal Outcome Summary  FOCUS/GOAL  Wound care management, Medical management and Skin integrity    ASSESSMENT, INTERVENTIONS AND CONTINUING PLAN FOR GOAL:  Pt alert and oriented, no complaints of pain, VSS. Pt states she feels light headed and not well when her blood pressure is down around 130/50. States that she feels the diastolic is too low at that point. Nurse educated pt that a blood pressure of that reading is not particularly low, but her body could have become accustomed to high blood pressures and needs to readjust. Pt had a shower this evening. After the shower when the pt was in her room she told another nurse she felt like she couldn't catch her breath and asked that nurse to spot check her oxygen saturation. Oxygen at 97% per nurse. Pt encouraged to do some deep breathing and incentive spirometry. Symptoms resolved with rest per pt. Pt noted blood on toilet paper when wiping, perianal slightly excoriated, barrier cream applied. No other concerns at this time.

## 2018-10-23 ENCOUNTER — AMBULATORY - HEALTHEAST (OUTPATIENT)
Dept: ADMINISTRATIVE | Facility: REHABILITATION | Age: 53
End: 2018-10-23

## 2018-10-23 DIAGNOSIS — I63.9 STROKE (H): ICD-10-CM

## 2018-10-23 LAB
ANION GAP SERPL CALCULATED.3IONS-SCNC: 9 MMOL/L (ref 3–14)
BUN SERPL-MCNC: 30 MG/DL (ref 7–30)
CALCIUM SERPL-MCNC: 8.4 MG/DL (ref 8.5–10.1)
CHLORIDE SERPL-SCNC: 103 MMOL/L (ref 94–109)
CO2 SERPL-SCNC: 26 MMOL/L (ref 20–32)
CREAT SERPL-MCNC: 1.03 MG/DL (ref 0.52–1.04)
ERYTHROCYTE [DISTWIDTH] IN BLOOD BY AUTOMATED COUNT: 16 % (ref 10–15)
GFR SERPL CREATININE-BSD FRML MDRD: 56 ML/MIN/1.7M2
GLUCOSE BLDC GLUCOMTR-MCNC: 158 MG/DL (ref 70–99)
GLUCOSE BLDC GLUCOMTR-MCNC: 167 MG/DL (ref 70–99)
GLUCOSE BLDC GLUCOMTR-MCNC: 169 MG/DL (ref 70–99)
GLUCOSE BLDC GLUCOMTR-MCNC: 179 MG/DL (ref 70–99)
GLUCOSE BLDC GLUCOMTR-MCNC: 191 MG/DL (ref 70–99)
GLUCOSE BLDC GLUCOMTR-MCNC: 226 MG/DL (ref 70–99)
GLUCOSE SERPL-MCNC: 187 MG/DL (ref 70–99)
HCT VFR BLD AUTO: 28.5 % (ref 35–47)
HGB BLD-MCNC: 9.3 G/DL (ref 11.7–15.7)
INR PPP: 2.76 (ref 0.86–1.14)
MCH RBC QN AUTO: 29.6 PG (ref 26.5–33)
MCHC RBC AUTO-ENTMCNC: 32.6 G/DL (ref 31.5–36.5)
MCV RBC AUTO: 91 FL (ref 78–100)
PLATELET # BLD AUTO: 307 10E9/L (ref 150–450)
POTASSIUM SERPL-SCNC: 3.8 MMOL/L (ref 3.4–5.3)
RBC # BLD AUTO: 3.14 10E12/L (ref 3.8–5.2)
SODIUM SERPL-SCNC: 138 MMOL/L (ref 133–144)
WBC # BLD AUTO: 8.3 10E9/L (ref 4–11)

## 2018-10-23 PROCEDURE — 97535 SELF CARE MNGMENT TRAINING: CPT | Mod: GO

## 2018-10-23 PROCEDURE — 40000133 ZZH STATISTIC OT WARD VISIT

## 2018-10-23 PROCEDURE — 85027 COMPLETE CBC AUTOMATED: CPT | Performed by: PHYSICAL MEDICINE & REHABILITATION

## 2018-10-23 PROCEDURE — 99366 TEAM CONF W/PAT BY HC PROF: CPT

## 2018-10-23 PROCEDURE — 97535 SELF CARE MNGMENT TRAINING: CPT | Mod: GO | Performed by: STUDENT IN AN ORGANIZED HEALTH CARE EDUCATION/TRAINING PROGRAM

## 2018-10-23 PROCEDURE — 97110 THERAPEUTIC EXERCISES: CPT | Mod: GP

## 2018-10-23 PROCEDURE — 80048 BASIC METABOLIC PNL TOTAL CA: CPT | Performed by: PHYSICAL MEDICINE & REHABILITATION

## 2018-10-23 PROCEDURE — 97530 THERAPEUTIC ACTIVITIES: CPT | Mod: GO

## 2018-10-23 PROCEDURE — 97530 THERAPEUTIC ACTIVITIES: CPT | Mod: GP

## 2018-10-23 PROCEDURE — 36415 COLL VENOUS BLD VENIPUNCTURE: CPT | Performed by: PHYSICAL MEDICINE & REHABILITATION

## 2018-10-23 PROCEDURE — 40000133 ZZH STATISTIC OT WARD VISIT: Performed by: STUDENT IN AN ORGANIZED HEALTH CARE EDUCATION/TRAINING PROGRAM

## 2018-10-23 PROCEDURE — 85610 PROTHROMBIN TIME: CPT | Performed by: PHYSICAL MEDICINE & REHABILITATION

## 2018-10-23 PROCEDURE — 12800006 ZZH R&B REHAB

## 2018-10-23 PROCEDURE — 00000146 ZZHCL STATISTIC GLUCOSE BY METER IP

## 2018-10-23 PROCEDURE — 40000225 ZZH STATISTIC SLP WARD VISIT

## 2018-10-23 PROCEDURE — 25000132 ZZH RX MED GY IP 250 OP 250 PS 637: Performed by: PHYSICAL MEDICINE & REHABILITATION

## 2018-10-23 PROCEDURE — 40000193 ZZH STATISTIC PT WARD VISIT

## 2018-10-23 PROCEDURE — 97127 ZZHC SP THERAPEUTIC INTERVENTION W/FOCUS ON COGNITIVE FUNCTION,EA 15 MIN: CPT | Mod: GN

## 2018-10-23 RX ORDER — WARFARIN SODIUM 5 MG/1
5 TABLET ORAL
Status: COMPLETED | OUTPATIENT
Start: 2018-10-23 | End: 2018-10-23

## 2018-10-23 RX ADMIN — ATORVASTATIN CALCIUM 80 MG: 80 TABLET, FILM COATED ORAL at 19:59

## 2018-10-23 RX ADMIN — DOCUSATE SODIUM 100 MG: 100 CAPSULE, LIQUID FILLED ORAL at 10:20

## 2018-10-23 RX ADMIN — LOSARTAN POTASSIUM 100 MG: 100 TABLET, FILM COATED ORAL at 08:10

## 2018-10-23 RX ADMIN — LIRAGLUTIDE 0.6 MG: 6 INJECTION SUBCUTANEOUS at 08:13

## 2018-10-23 RX ADMIN — METOPROLOL TARTRATE 125 MG: 50 TABLET, FILM COATED ORAL at 08:10

## 2018-10-23 RX ADMIN — WARFARIN SODIUM 5 MG: 5 TABLET ORAL at 17:29

## 2018-10-23 RX ADMIN — ASPIRIN 81 MG CHEWABLE TABLET 81 MG: 81 TABLET CHEWABLE at 08:10

## 2018-10-23 RX ADMIN — ACETAMINOPHEN 650 MG: 325 TABLET, FILM COATED ORAL at 23:31

## 2018-10-23 RX ADMIN — AMLODIPINE BESYLATE 10 MG: 10 TABLET ORAL at 08:10

## 2018-10-23 RX ADMIN — METOPROLOL TARTRATE 125 MG: 50 TABLET, FILM COATED ORAL at 19:59

## 2018-10-23 NOTE — PLAN OF CARE
Problem: Goal/Outcome  Goal: Goal Outcome Summary  Outcome: Improving  FOCUS/GOAL  Medical management    ASSESSMENT, INTERVENTIONS AND CONTINUING PLAN FOR GOAL:  Pt. Is alert and oriented X4. Slept well overnight. Mod I in the room with a walker. Uses call light appropriately. Denies pain or SOB. Will start MAP in the AM. Indep. With repositioning. Incision to sternum healing well, edges or incision approximated, no warmth or redness.

## 2018-10-23 NOTE — PLAN OF CARE
Problem: Patient Care Overview  Goal: Plan of Care/Patient Progress Review    Patient engaged in variety of cognitive tasks (memory, attention, problem solving, etc.). Patient had little to no difficulties with basic math problems. Patient had moderate to severe difficulties with higher level problem solving tasks and benefited from increased time to complete task. Observed that patient had increased difficulties completing tasks as tasks became increasingly difficult. Noted that patient also benefited from stating useful strategies aloud. Noted that compared to yesturday, patient demonstrated increased awareness of errors during task and needed little to no clinician feedback to self-correct errors. Note that some errors maybe related to vision difficulties.

## 2018-10-23 NOTE — PLAN OF CARE
Problem: Goal/Outcome  Goal: Goal Outcome Summary  Patient alert and oriented. VSS. Denies pain/SOB/nausea/vomting/numbness. Patient to start MAP in the morning, med list made, and medications bottle present. Continues to be MOD I in room during the day and bed alarm at night. patient continent of bowel and bladder in toilet. Bed in low position and call light within patient reach.

## 2018-10-23 NOTE — PLAN OF CARE
"Problem: Goal/Outcome  Goal: Goal Outcome Summary  FOCUS/GOAL  Medication management and Discharge planning    ASSESSMENT, INTERVENTIONS AND CONTINUING PLAN FOR GOAL:  Patient is alert and oriented and able to make her needs known. She is modified independent in her room with a rollator. Patient is continent of bowel and bladder and did report having a bowel movement this shift. She requested PRN Colace, as she stated her stool was hard. Patient started MAP this shift. She did call appropriately for her 0800 medications and identified the correct medications and dosages with minimal assistance due to eyesight. Patient states that she needs \"readers\" and utilizes them in speech therapy. SLP stated that they would leave the glasses in patient's room so she could use them. She obtained her own blood glucose twice this shift. This morning, blood glucose was 191 on unit's glucometer and it was 213 on patient's glucometer. This afternoon, patient's blood glucose was 190 on her own glucometer, but did not alert staff that she obtained it. She declined to have it checked again. Weight stable today. She denied shortness of breath and lungs are clear bilaterally throughout upon auscultation. LLL and RLL diminished. Patient had a care conference this shift. Patient has participated in CVA, HF, and Coumadin PLC classes while on unit. Patient needs to practice administering Victoza prior to discharge. Patient will discharge on 10/25 to home and participate in outpatient therapy. Writer encouraged patient to obtain and track blood pressures, blood glucose, and weights at home to provide to primary care provider. Patient has denied any difficulty with pain or discomfort this shift.         "

## 2018-10-23 NOTE — PROGRESS NOTES
"                          Diabetes Consult Daily  Progress Note          Assessment/Plan:     Ms. Christina Sanchez is a 54 yo woman with a history of uncontrolled type 2 diabetes, HTN, HLD, CHF, CKD, CAD with recent CABG x3 on 10/8/18, post operative course complicated by multiple bilateral cerebral embolic infarctions, she transferred to  [facility] on 10/18/18.  Pt refuses to take several diabetic agents (including insulin) because of past adverse reactions.    No glucose values obtained later in the day yesterday, but morning numbers are encouraging.    Plan:    -continue iraglutide (Victoza) 0.6mg daily  -monitor glucose ac, hs       Outpatient diabetes follow up: TBD-- recommend she establish with PCP who can regularly follow for diabetes.  Plan discussed with patient           Interval History:     The last 24 hours progress and nursing notes reviewed.  Christina starting MAP today and has care conference.  Hoping for discharge home Thursday    No documented reason for omitted supper, HS BGs-- perhaps theyhaven't loaded yet?  No documentation of change in GI state, following start lirag yesterday.  Christina explains she does feel \"buzzy\", which is how she previously described insulin made her feel.  She also says she feels too much energy, like she could put her foot through a wall.  She is trying to manage with self-relaxation techniques.  She also redevelopled some neck pain last evening, which is bothersome, but was responsive to heat/ice.  She says all medications make her feel \"off\" in some way.  So, she took the liraglutide again this morning and willing to keep observing the impact.  She is eating well- likes the food here.      She has 100% coverage for Victoza.  Pt ok with this switch- discussed GLP-1 agonists with pt at length on 10/21.--IKER Collazo PA-C       PTA Regimen:  No medication since 6/2014.    In the past she tried (and did not tolerate):  Metformin  Pioglitazone  Glipizide  Insulin     Occasionally " "checks glucoses.      Recent Labs  Lab 10/23/18  0609 10/23/18  0213 10/22/18  1235 10/22/18  0818 10/22/18  0551 10/21/18  2119 10/21/18  1709 10/21/18  1235  10/20/18  0623  10/19/18  0532   *  --   --   --  243*  --   --   --   --  254*  --  263*   BGM  --  158* 281* 260*  --  227* 249* 263*  < >  --   < >  --    < > = values in this interval not displayed.            Review of Systems:   See interval hx          Medications:       Active Diet Order      Combination Diet Regular Diet Adult; [id number] Calories: Moderate Consistent CHO (4-6 CHO units/meal); 2 gm NA Diet    Physical Exam:  Gen: Alert, sitting up in chair, in NAD, wearing own clothes  HEENT: NC/AT, mucous membranes are moist  Resp: Unlabored  Neuro:oriented x3, communicating clearly  /73 (BP Location: Left arm)  Pulse 88  Temp 97.7  F (36.5  C) (Oral)  Resp 16  Ht 1.575 m (5' 2\")  Wt 83.1 kg (183 lb 1.6 oz)  SpO2 95%  BMI 33.49 kg/m2           Data:   No results found for: A1C         Recent Labs   Lab Test  10/23/18   0609  10/22/18   0551   NA  138  137   POTASSIUM  3.8  3.8   CHLORIDE  103  103   CO2  26  27   ANIONGAP  9  7   GLC  187*  243*   BUN  30  35*   CR  1.03  1.03   ADIEL  8.4*  8.5     CBC RESULTS:   Recent Labs   Lab Test  10/23/18   0609   WBC  8.3   RBC  3.14*   HGB  9.3*   HCT  28.5*   MCV  91   MCH  29.6   MCHC  32.6   RDW  16.0*   PLT  307     Shelby Shirley APRN -[id number]  Diabetes Management Team job code: [id number]            "

## 2018-10-23 NOTE — PROGRESS NOTES
[facility]   Acute Rehabilitation Unit  Daily progress note    INTERVAL HISTORY  No acute events overnight.  Today has no new concerns or complaints.  Denies chest pain or shortness of breath.  With therapies, has now been advanced to independent in the room at all times with the Rollator.  Care conference held today, please see note for further details.         MEDICATIONS  Scheduled:    - Medication Assessment Program - Rehab Services   Does not apply See Admin Instructions     amLODIPine  10 mg Oral Daily     aspirin  81 mg Oral Daily     atorvastatin  80 mg Oral At Bedtime     liraglutide  0.6 mg Subcutaneous Daily     losartan  100 mg Oral Daily     metoprolol tartrate  125 mg Oral BID     warfarin  5 mg Oral ONCE at 18:00        PRN:  acetaminophen, bisacodyl, docusate sodium, hydrALAZINE, ipratropium - albuterol 0.5 mg/2.5 mg/3 mL, magnesium hydroxide, ondansetron, - MEDICATION INSTRUCTIONS -, polyethylene glycol, sennosides, Warfarin Therapy Reminder       PHYSICAL EXAM  Patient Vitals for the past 24 hrs:   BP Temp Temp src Pulse Heart Rate Resp SpO2 Weight   10/23/18 1014 143/58 - - 96 - - - -   10/23/18 0759 138/57 - - 86 - - 96 % -   10/23/18 0613 163/73 97.7  F (36.5  C) Oral 88 - 16 95 % 83.1 kg (183 lb 1.6 oz)   10/22/18 2105 170/73 - - - 76 - - -   10/22/18 1543 143/68 97.8  F (36.6  C) Oral 72 - 18 93 % -       GEN: NAD, flat affect  HEENT: NC/AT  CVS: RRR, S1+S2, no m/r/g.  +Sternal incision healing well.  PULM: CTA b/l, no w/r/r  ABD: Soft, NT, ND, bowel sounds present.  +Umbilical hernia present.  EXT: No LE edema or calf tenderness b/l  Neuro: Answers appropriately, follows commands    LABS  Lab Results   Component Value Date    WBC 8.3 10/23/2018     Lab Results   Component Value Date    RBC 3.14 10/23/2018     Lab Results   Component Value Date    HGB 9.3 10/23/2018     Lab Results   Component Value Date    HCT 28.5 10/23/2018     No components found for:  MCT  Lab Results   Component Value Date    MCV 91 10/23/2018     Lab Results   Component Value Date    MCH 29.6 10/23/2018     Lab Results   Component Value Date    MCHC 32.6 10/23/2018     Lab Results   Component Value Date    RDW 16.0 10/23/2018     Lab Results   Component Value Date     10/23/2018         Last Comprehensive Metabolic Panel:  Sodium   Date Value Ref Range Status   10/23/2018 138 133 - 144 mmol/L Final     Potassium   Date Value Ref Range Status   10/23/2018 3.8 3.4 - 5.3 mmol/L Final     Chloride   Date Value Ref Range Status   10/23/2018 103 94 - 109 mmol/L Final     Carbon Dioxide   Date Value Ref Range Status   10/23/2018 26 20 - 32 mmol/L Final     Anion Gap   Date Value Ref Range Status   10/23/2018 9 3 - 14 mmol/L Final     Glucose   Date Value Ref Range Status   10/23/2018 187 (H) 70 - 99 mg/dL Final     Urea Nitrogen   Date Value Ref Range Status   10/23/2018 30 7 - 30 mg/dL Final     Creatinine   Date Value Ref Range Status   10/23/2018 1.03 0.52 - 1.04 mg/dL Final     GFR Estimate   Date Value Ref Range Status   10/23/2018 56 (L) >60 mL/min/1.7m2 Final     Comment:     Non  GFR Calc     Calcium   Date Value Ref Range Status   10/23/2018 8.4 (L) 8.5 - 10.1 mg/dL Final     INR   Date Value Ref Range Status   10/23/2018 2.76 (H) 0.86 - 1.14 Final          Recent Labs  Lab 10/23/18  0804 10/23/18  0609 10/23/18  0213 10/22/18  1235 10/22/18  0818 10/22/18  0551 10/21/18  2119 10/21/18  1709  10/20/18  0623  10/19/18  0532   GLC  --  187*  --   --   --  243*  --   --   --  254*  --  263*   *  --  158* 281* 260*  --  227* 249*  < >  --   < >  --    < > = values in this interval not displayed.    ASSESSMENT  Christina Sanchez is a 53 year old RHD womanwith a PMH including but not limited to HTN, uncontrolled diabetes (HbA1c 9.1), CAD, CHF, bilateral foot drop, and medication non-compliance who presented to [facility] on 10/1/18 for shortness of breath, chest  pain, productive cough, and nausea, and found to be in CHF exacerbation and hypertensive emergency.  Hospital course was complicated by UTI and bacteremia, and found NSTEMI requiring transfer to [facility] for further treatment and workup.  Found to have 2 vessel disease, s/p CABG which was complicated by embolic CVA.  Now on Aspirin and coumadin.       Impairment group code: 01.4 No Paresis, bilateral hemispheric strokes following CABG x3      PLAN  Rehabilitation  -Continue inpatient rehabilitation including PT, OT, and speech therapies.  -Needs multidisciplinary approach including therapies, rehab nursing, social work, and physiatry.  -Now Modified independent in the room at all times with [facility]  1)Neurology  -Bilateral multiple hemispheric CVAs due to embolism in the setting of CABG                         -MRI in 6-8 weeks to follow up evolution of strokes                         -Follow up with neurology after discharge                         -ASA 81 mg and Coumadin (goal 2-3) for secondary stroke prevention.  In addition will provide ongoing education regarding risk factor modification including controlling HTN and DM, medication compliance, lifestyle and diet changes, exercise, and physician follow up  -L arm numbness                         -EMG as an outpatient.  MRI shows stenosis at the C5-6 level, however her symptoms are in the ulnar or C8/T1 distribution     2)Cardiology  -CAD s/p NSTEMI and CABG x3                         -ASA 81 mg daily                         -Sternal precautions 3 months, no lifting >10 lbs for 6-8 weeks                         -May wash incision with soap                         -Follow up with CV surgery (Olga Lidia Mcclendon NP, appointment on 11?/3)  -HTN/CHF                         -Norvasc 10 mg daily, Cozaar 100 mg daily, Metoprolol 125 mg BID                         -Lasix discontinued due to increasing creatinine which has now returned to her baseline.  No signs  of fluid overload, will keep                          -Hydralazine PRN for SBP >160 mmHg  -HLD: Lipitor 80 mg daily  -Follow up with cardiology made on December 6 with Dr. Cantrell     3)Pulm  -Acute respiratory failure post op due to volume overload, now off supplemental oxygen.  Monitor respiratory and volume status.  -Chest tubes removed 10/5  -Duonebs q4h PRN  -Encourage incentive spirometry     4)FENGI  -Diet: Diabetic/Cardiac, regular consistency solids and thin liquids  -Monitor sodium and potassium levels  -Bowel meds PRN  -Zofran PRN     5)  -PVRs WNL, may discontinue checks.  -Prior UTI and bacteremia, now completed treatment  -Prior acute on chronic kidney injury, Creatinine was increasing again but now returned to baseline after discontinuation of Lasix.  Creatinine this morning 1.03 which is stable.     6)DVT prophylaxis  -On Coumadin for embolic CVA, pharmacy to assist with INR monitoring (goal 2-3) and dosing  -INR now therapeutic at 2.76 this morning    7)Pain  -Has not complained of pain, continue Tylenol PRN     8)Endocrine  -Uncontrolled DM  -Endocrine assistance greatly appreciated.  Januvia now discontinued and Liraglutide (Victoza) started.  Continue to monitor glucose levels, but not covering with sliding scale due to patient refusal.     9)ID  -Prior bacteremia and UTI, now completed treatment  -Monitor for signs of infection     10)Psych  -Has expressed depression and difficulty coping.  Have asked health psychology to evaluate.     11)Heme  -Acute blood loss anemia post-operatively, Hgb 9.6 on 10/19.  Repeat today 9.3, overall stable.  -No evidence of active bleeding, monitor peripherally     12)MSK  -Umbilical hernia: Needs follow up with general surgery (Dr. Ambrocio) for repair in 1-2 months     13)Dispo:  -Plan for discharge home  -Rehab Prognosis: Good  -ELOS: Anticipated discharge date of 10/25 to home.  Care conference held today.      Code status: Full (discussed with  patient)     Shan Merlos MD  Department of Rehabilitation Medicine  Pager: [phone]      Time Spent on this Encounter   I, Shan Merlos, spent a total of 35 minutes bedside and on the inpatient unit today managing the care of Christina Sanchez.  Over 50% of my time on the unit was spent counseling the patient and /or coordinating care regarding medical and functional progress including care conference with family.  See above note and separate care conference note for details.

## 2018-10-23 NOTE — PLAN OF CARE
Problem: Patient Care Overview  Goal: Plan of Care/Patient Progress Review  Tried B compression socks to assist with edema management in BLE. Pt reporting tenderness around LLE incision site. Rolled sock down below incision on LLE, and pt reporting improved tolerance.    Will continue to progress strength and independence with stairs prior to discharge home.

## 2018-10-24 ENCOUNTER — TELEPHONE (OUTPATIENT)
Dept: ENDOCRINOLOGY | Facility: CLINIC | Age: 53
End: 2018-10-24

## 2018-10-24 LAB
GLUCOSE BLDC GLUCOMTR-MCNC: 149 MG/DL (ref 70–99)
GLUCOSE BLDC GLUCOMTR-MCNC: 157 MG/DL (ref 70–99)
GLUCOSE BLDC GLUCOMTR-MCNC: 158 MG/DL (ref 70–99)
INR PPP: 2.71 (ref 0.86–1.14)

## 2018-10-24 PROCEDURE — 97530 THERAPEUTIC ACTIVITIES: CPT | Mod: GP | Performed by: PHYSICAL THERAPIST

## 2018-10-24 PROCEDURE — 40000133 ZZH STATISTIC OT WARD VISIT

## 2018-10-24 PROCEDURE — 85610 PROTHROMBIN TIME: CPT | Performed by: PHYSICAL MEDICINE & REHABILITATION

## 2018-10-24 PROCEDURE — 12800006 ZZH R&B REHAB

## 2018-10-24 PROCEDURE — 36415 COLL VENOUS BLD VENIPUNCTURE: CPT | Performed by: PHYSICAL MEDICINE & REHABILITATION

## 2018-10-24 PROCEDURE — 40000225 ZZH STATISTIC SLP WARD VISIT: Performed by: SPEECH-LANGUAGE PATHOLOGIST

## 2018-10-24 PROCEDURE — 40000193 ZZH STATISTIC PT WARD VISIT: Performed by: PHYSICAL THERAPIST

## 2018-10-24 PROCEDURE — 25000132 ZZH RX MED GY IP 250 OP 250 PS 637: Performed by: PHYSICAL MEDICINE & REHABILITATION

## 2018-10-24 PROCEDURE — 00000146 ZZHCL STATISTIC GLUCOSE BY METER IP

## 2018-10-24 PROCEDURE — 97110 THERAPEUTIC EXERCISES: CPT | Mod: GP | Performed by: PHYSICAL THERAPIST

## 2018-10-24 PROCEDURE — 97535 SELF CARE MNGMENT TRAINING: CPT | Mod: GO

## 2018-10-24 PROCEDURE — 97127 ZZHC SP THERAPEUTIC INTERVENTION W/FOCUS ON COGNITIVE FUNCTION,EA 15 MIN: CPT | Mod: GN | Performed by: SPEECH-LANGUAGE PATHOLOGIST

## 2018-10-24 RX ORDER — POLYETHYLENE GLYCOL 3350 17 G/17G
17 POWDER, FOR SOLUTION ORAL DAILY PRN
Qty: 7 PACKET | Refills: 0 | Status: SHIPPED | OUTPATIENT
Start: 2018-10-24

## 2018-10-24 RX ORDER — DOCUSATE SODIUM 100 MG/1
100 CAPSULE, LIQUID FILLED ORAL 2 TIMES DAILY PRN
Qty: 60 CAPSULE | Refills: 0 | Status: SHIPPED | OUTPATIENT
Start: 2018-10-24

## 2018-10-24 RX ORDER — LOSARTAN POTASSIUM 100 MG/1
100 TABLET ORAL DAILY
Qty: 30 TABLET | Refills: 0 | Status: SHIPPED | OUTPATIENT
Start: 2018-10-25

## 2018-10-24 RX ORDER — ASPIRIN 81 MG/1
81 TABLET, CHEWABLE ORAL DAILY
Qty: 30 TABLET | Refills: 0 | Status: SHIPPED | OUTPATIENT
Start: 2018-10-25

## 2018-10-24 RX ORDER — ATORVASTATIN CALCIUM 80 MG/1
80 TABLET, FILM COATED ORAL AT BEDTIME
Qty: 30 TABLET | Refills: 0 | Status: SHIPPED | OUTPATIENT
Start: 2018-10-24 | End: 2018-10-25

## 2018-10-24 RX ORDER — METOPROLOL TARTRATE 50 MG
125 TABLET ORAL 2 TIMES DAILY
Qty: 150 TABLET | Refills: 0 | Status: SHIPPED | OUTPATIENT
Start: 2018-10-24

## 2018-10-24 RX ORDER — LIRAGLUTIDE 6 MG/ML
0.6 INJECTION SUBCUTANEOUS DAILY
Qty: 3 ML | Refills: 0 | Status: SHIPPED | OUTPATIENT
Start: 2018-10-25

## 2018-10-24 RX ORDER — WARFARIN SODIUM 5 MG/1
5 TABLET ORAL
Status: COMPLETED | OUTPATIENT
Start: 2018-10-24 | End: 2018-10-24

## 2018-10-24 RX ORDER — AMLODIPINE BESYLATE 10 MG/1
10 TABLET ORAL DAILY
Qty: 30 TABLET | Refills: 0 | Status: SHIPPED | OUTPATIENT
Start: 2018-10-25

## 2018-10-24 RX ORDER — BISACODYL 10 MG
10 SUPPOSITORY, RECTAL RECTAL DAILY PRN
Qty: 10 SUPPOSITORY | Refills: 0 | Status: SHIPPED | OUTPATIENT
Start: 2018-10-24

## 2018-10-24 RX ADMIN — LOSARTAN POTASSIUM 100 MG: 100 TABLET, FILM COATED ORAL at 08:20

## 2018-10-24 RX ADMIN — ACETAMINOPHEN 650 MG: 325 TABLET, FILM COATED ORAL at 21:58

## 2018-10-24 RX ADMIN — WARFARIN SODIUM 5 MG: 5 TABLET ORAL at 18:09

## 2018-10-24 RX ADMIN — LIRAGLUTIDE 0.6 MG: 6 INJECTION SUBCUTANEOUS at 08:22

## 2018-10-24 RX ADMIN — ATORVASTATIN CALCIUM 80 MG: 80 TABLET, FILM COATED ORAL at 20:02

## 2018-10-24 RX ADMIN — HYDRALAZINE HYDROCHLORIDE 10 MG: 10 TABLET, FILM COATED ORAL at 17:00

## 2018-10-24 RX ADMIN — ASPIRIN 81 MG CHEWABLE TABLET 81 MG: 81 TABLET CHEWABLE at 08:19

## 2018-10-24 RX ADMIN — METOPROLOL TARTRATE 125 MG: 50 TABLET, FILM COATED ORAL at 08:21

## 2018-10-24 RX ADMIN — METOPROLOL TARTRATE 125 MG: 50 TABLET, FILM COATED ORAL at 20:02

## 2018-10-24 RX ADMIN — AMLODIPINE BESYLATE 10 MG: 10 TABLET ORAL at 08:20

## 2018-10-24 NOTE — DISCHARGE SUMMARY
[facility]   Acute Rehabilitation Unit  Discharge summary     Date of Admission: 10/18/2018  Date of Discharge: 10/25/18  Disposition: Home  Primary Care Physician: Wegener, Joel Daniel Irwin  Attending physician: Aide Merlos MD  Other significant physician provider(s): Endocrinology team, JONA Benjamin MD on 10/25      discharge diagnosis      Embolic Stroke    Impairments in strength, ROM, balance, endurance, and cognition leading to functional limitations in ambulation, mobility, and ADLs    CAD s/p CABG, CHF, volume overload, hypokalemia, uncontrolled diabetes mellitus, HTN, HLD, acute on chronic kidney injury, bilateral foot drop    brief summary (per H+P)  Christina Sanchez is a 53 year old right hand dominant woman with a PMH including but not limited to HTN, uncontrolled diabetes (HbA1c 9.1), CAD, CHF, bilateral foot drop (pt states due to Atenolol, prior notes state could be due to medication side effect vs. Viral infection sequelae), and medication non-compliance who presented to [facility] on 10/1/18 for shortness of breath, chest pain, productive cough, and nausea.  She was diagnosed with a CHF exacerbation and hypertensive emergency, as BP was 221/95.  Treatment was initiated, and was also started on Zosyn for bacteremia and UTI.  Troponins were then found to be elevated and uptrending with subsequent EKG changes, and she was then transferred to [facility] for further evaluation.  At [facility], a LHC was performed which showed severe 2 vessel disease and she subsequently underwent a CABG x3 on 10/8 by Dr. Whitney.  Intra-operatively she required pressor support and transfusion of 3 units PRBCs.  Post-operative developed SILVERIO and hyponatremia for which nephrology was consulted and felt was due to diabetic nephropathy and fluid overload, and responded well to diuretics.  On POD 5 she complained of left arm numbness and other non-specific  neurological symptoms, and an MRI showed multiple bilateral cerebral embolic infarctions.  She was started on ASA and Coumadin for this, with Plavix discontinued.  Workup included a CTA of the head and neck which showed occlusion of the left PCA and 50-70% stenosis of the proximal R ICA, and an echo showed an EF of 65-70% without vegetations.  Other notable events include surgical consultation for a large umbilical hernia, which will be re-evaluated in 1-2 months.  Of note, the patient is very particular about diabetes medications including refusal of insulin and Metformin due to prior adverse effects, and also blames her current deficits on insulin she received during her hospitalization.  She says she is very brittle, and sensitive to medications in general.  She is currently on Januvia only.  She also complained of L arm numbness, and an MRI C-spine showed moderate to severe spinal canal and left foraminal narrowing at C5-6.  Outpatient EMG was recommended.    On day of discharge, patient reported refusing to take Atorvastatin due to diffuse muscle pain, nausea, and vomiting.  Patient reports that her Mother also this reaction to Atorvastatin but tolerates Simvastatin.  After extensive discussion, will transition to Simvastatin 40 mg PO at bedtime with close follow-up with PCP with recommendations to have BMP and LFT checked.     REHABILITATION COURSE  Christina Sanchez was admitted to the [facility] acute inpatient rehabilitation unit on 10/18/18 where she participated in PT, OT, and speech therapies.  She expressed some difficulty adjusting and anxiety and depression, so health psychology was consulted and followed along with the patient.  Her glucose levels remained elevated and Endocrinology was consulted for further assistance, especially given her reluctance to take insulin.  She was switched from Januvia to Victoza, and there was a noted improvement in glucose levels although still not ideally controlled.  BP was  "monitored and was generally well controlled with intermittently elevated values.  The patient was hesitant to lower her BP further as she says she becomes symptomatic (\"cold, can't think\") when BPs are in the 130s systolic and 50s diastolic.  Lasix was discontinued as creatinine levels were increasing, and returned to normal after cessation.  Fluid status was monitored and she did not have dyspnea or crackles on pulmonary exam.  Coumadin doses adjusted per pharmacy based on INRs, and upon discharge she was therapeutic. She progressed well throughout her time on rehab and at the time of discharge she was modified independent with a Rollator for ambulation, mobility, and all ADLs.  Continued to have some deficits with higher level cognition and memory.  She will continue with outpatient cardiac rehab, OT, and speech therapies.  She will need to follow up with her PCP, CV surgery, cardiology, neurology with MRI head and EMG, general surgery for hernia repair, and endocrinology.      dISCHARGE MEDICATIONS  Current Discharge Medication List      START taking these medications    Details   amLODIPine (NORVASC) 10 MG tablet Take 1 tablet (10 mg) by mouth daily  Qty: 30 tablet, Refills: 0    Associated Diagnoses: Cerebrovascular accident (CVA) due to embolism of other cerebral artery (H); Hypertension goal BP (blood pressure) < 140/90      aspirin 81 MG chewable tablet Take 1 tablet (81 mg) by mouth daily  Qty: 30 tablet, Refills: 0    Associated Diagnoses: Cerebrovascular accident (CVA) due to embolism of other cerebral artery (H)      bisacodyl (DULCOLAX) 10 MG Suppository Place 1 suppository (10 mg) rectally daily as needed for constipation  Qty: 10 suppository, Refills: 0    Associated Diagnoses: Constipation, unspecified constipation type; Cerebrovascular accident (CVA) due to embolism of other cerebral artery (H)      docusate sodium (COLACE) 100 MG capsule Take 1 capsule (100 mg) by mouth 2 times daily as needed for " constipation  Qty: 60 capsule, Refills: 0    Associated Diagnoses: Constipation, unspecified constipation type; Cerebrovascular accident (CVA) due to embolism of other cerebral artery (H)      liraglutide (VICTOZA) 18 MG/3ML soln Inject 0.6 mg Subcutaneous daily  Qty: 3 mL, Refills: 0    Associated Diagnoses: Cerebrovascular accident (CVA) due to embolism of other cerebral artery (H); Type 2 diabetes mellitus with diabetic nephropathy, without long-term current use of insulin (H)      losartan (COZAAR) 100 MG tablet Take 1 tablet (100 mg) by mouth daily  Qty: 30 tablet, Refills: 0    Associated Diagnoses: Cerebrovascular accident (CVA) due to embolism of other cerebral artery (H); Hypertension goal BP (blood pressure) < 140/90      metoprolol tartrate (LOPRESSOR) 50 MG tablet Take 2.5 tablets (125 mg) by mouth 2 times daily  Qty: 150 tablet, Refills: 0    Associated Diagnoses: Cerebrovascular accident (CVA) due to embolism of other cerebral artery (H); Hypertension goal BP (blood pressure) < 140/90      polyethylene glycol (MIRALAX/GLYCOLAX) Packet Take 17 g by mouth daily as needed for constipation  Qty: 7 packet, Refills: 0    Associated Diagnoses: Constipation, unspecified constipation type; Cerebrovascular accident (CVA) due to embolism of other cerebral artery (H)      simvastatin (ZOCOR) 40 MG tablet Take 1 tablet (40 mg) by mouth At Bedtime  Qty: 30 tablet, Refills: 0    Associated Diagnoses: Cerebrovascular accident (CVA) due to embolism of other cerebral artery (H)      warfarin (COUMADIN) 4 MG tablet Take 1 tablet (4 mg) by mouth daily for 4 days  Qty: 4 tablet, Refills: 0    Associated Diagnoses: Cerebrovascular accident (CVA) due to embolism of other cerebral artery (H); S/P CABG (coronary artery bypass graft)         CONTINUE these medications which have NOT CHANGED    Details   ACETAMINOPHEN PO Take 650 mg by mouth every 6 hours as needed for pain      Blood Glucose Monitoring Suppl (BLOOD GLUCOSE  METER) KIT 1 kit 6 times daily Meter covered by insurance  Qty: 1 kit, Refills: 1    Associated Diagnoses: Diabetes mellitus, type 2 (H)      Glucose Blood (BLOOD GLUCOSE TEST STRIPS) STRP Strips that go with meter covered by insurance test 6-8 times dailiy  Qty: 200 strip, Refills: 11    Associated Diagnoses: Diabetes mellitus, type 2 (H)      LANCETS ULTRA THIN 30G MISC Lancets that go with meter covered by insurance test 6-8 times daily  Qty: 200 each, Refills: 3    Associated Diagnoses: Diabetes mellitus, type 2 (H)      ORDER FOR DME Equipment being ordered: Walker with four wheels, brakes and seat with basket.  Qty: 1 each, Refills: 0    Associated Diagnoses: Idiopathic progressive polyneuropathy; Diabetic neuropathy (H)         STOP taking these medications       azithromycin (ZITHROMAX) 250 MG tablet Comments:   Reason for Stopping:         glucagon 1 MG injection Comments:   Reason for Stopping:                 DISCHARGE INSTRUCTIONS AND FOLLOW UP    Discharge Procedure Orders  Cardiac Rehab Referral   Standing Status: Future  Standing Exp. Date: 10/24/19   Referral Type: Rehab Therapy Cardiac Therapy     Speech Therapy Referral   Standing Status: Future  Standing Exp. Date: 10/24/19   Referral Type: Therapeutic Services     Occupational Therapy Referral   Standing Status: Future  Standing Exp. Date: 10/24/19   Referral Type: Occupational Therapy     Reason for your hospital stay   Order Comments: Rehabilitation after heart surgery and a stroke     Adult [facility] Follow-up and recommended labs and tests   Order Comments: Follow up with primary care provider, Joel Daniel Wegener, within 7 days to evaluate medication change, to evaluate after surgery and for hospital follow- up.  The following labs/tests are recommended: Potassium, Creatinine.      Appointments on [place] and/or [facility] (with [facility] or [facility] provider or service). Call [phone] if you haven't heard regarding these appointments  within 7 days of discharge.     Activity   Order Comments: Your activity upon discharge: activity as tolerated with Rollator and no driving for today   Order Specific Question Answer Comments   Is discharge order? Yes      Monitor and record   Order Comments: blood glucose 4 times a day, before meals and at bedtime  blood pressure daily     When to contact your care team   Order Comments: Call your primary doctor if you have any of the following: Chest pain, shortness of breath, worsening weakness, confusion, fevers, chills, difficulty speaking, facial droop, or any other symptoms you may find concerning     Wound care and dressings   Order Comments: Instructions to care for your wound at home: as directed.     Full Code   Order Specific Question Answer Comments   Code status determined by: Discussion with patient/legal decision maker      Diet   Order Comments: Combination Diet Regular Diet Adult; 6204-9546 Calories: Moderate Consistent CHO (4-6 CHO units/meal); 2 gm NA Diet   Order Specific Question Answer Comments   Is discharge order? Yes             physical examination    Most recent Vital Signs:   Vitals:    10/24/18 1609 10/24/18 1658 10/24/18 1751 10/25/18 0745   BP: 165/75 163/78 144/73 173/87   BP Location: Left arm Left arm Left arm Right arm   Pulse:    84   Resp: 16   16   Temp: 97.5  F (36.4  C)   97.8  F (36.6  C)   TempSrc: Oral   Oral   SpO2: 97%   95%   Weight:       Height:         General:  Middle aged, White female, seated in chair, no acute distress.  HEENT:  Normocephalic, atraumatic, nontender to palpation over posterior neck, no cervical lymphadenopathy.  CV:  S1, S2 , no murmurs, clicks, or rubs.  2+ radial pulses bilaterally.  PULM:  Vesicular breath sounds bilaterally, no wheezing, rales, or rhonchi.   GI:  Soft, nontender, nondistended, hyperactive bowel sounds.  Midline abdominal hernia, reducible.   Neuro:  Mental status:  Awake, alert, appropriately answering questions.  Orientation:  " To person, place \"Cyr\", and time.  Sensation:  Paresthesia in the left hand.  Gait:  Uses FWW, slowed gait.   MSK: Chest is nontender to palpation, midline incision clean, dry, and intact.        40 minutes spent in discharge, including >50% in counseling and coordination of care, medication review and plan of care recommended on follow up.     Discharge summary was forwarded to Wegener, Joel Daniel Irwin (PCP) at the time of discharge, so as to bridge from hospital to outpatient care.     It was our pleasure to care for Christina Sanchez during this hospitalization. Please do not hesitate to contact me should there be questions regarding the hospital course or discharge plan.      I, Dr. Benjamin, have seen and examined the patient today. I have reviewed the above resident's discharge summary and agree with content.  Total time: 25 min  Time with patient: 15 min    JONA Benjamin MD      "

## 2018-10-24 NOTE — PLAN OF CARE
Problem: Patient Care Overview  Goal: Plan of Care/Patient Progress Review  Occupational Therapy Discharge Summary    Reason for therapy discharge:    All goals and outcomes met, no further needs identified.    Progress towards therapy goal(s). See goals on Care Plan in [facility] electronic health record for goal details.  Goals met    Therapy recommendation(s):    Continued therapy is recommended.  Rationale/Recommendations:  ti improve for higher iadls such as medication management, cooking, finances. Pt is now mod I for basic adls and will go home with assist from  and mom. Outpatient OT at [facility]. .     DME//Ae: reacher, shower chair. Educated  and patient regarding purchasing shower chair as well as CGA for tub transfer using grab bar for safety.

## 2018-10-24 NOTE — PROGRESS NOTES
10/19/18 0632   PT Care Plan   PT Frequency daily, 60 min   PT goal: bed mobility Independent;Supine to/from sit;Rolling   PT goal: transfers Modified independent;Sit to/from stand;Bed to/from chair;Within precautions  (4WW)   PT goal: gait Modified independent;Greater than 200 feet;Rolling walker  (4WW)   PT goal: stairs Modified independent;Rail on both sides;3 stairs;Within precautions   PT goal: perform aerobic activity with stable cardiovascular response ambulation;15 minutes;continuous activity   PT goal 1 IND car transfer w/ sternal precautions   PT Goal 2 IND LE strengthening HEP

## 2018-10-24 NOTE — PROGRESS NOTES
[facility]   Acute Rehabilitation Unit  Daily progress note    INTERVAL HISTORY  No acute events overnight.  This morning has no new concerns or complaints.  Says last night her ankles were a little bit swollen, but now resolved and this has happened throughout her entire life.  She denies shortness of breath, chest pain, or nausea.  No issues with urination but is feeling constipated.  Her brother expressed concern about not being on Lasix.  I called him to discuss this.  I explained the plan upon transferring to rehab was to continue Lasix for 4 days after transfer and re-evaluating, however it was discontinued a little early due to rising creatinine.  The creatinine has since returned to normal and she has not had shortness of breath or crackles on exam so we will continue holding from it.  This could be re-evaluated with her cardiologist as an outpatient.  He was appreciative of the explanation and the call.  From a functional standpoint, Christina is doing well and on track for discharge tomorrow.      MEDICATIONS  Scheduled:    - Medication Assessment Program - Rehab Services   Does not apply See Admin Instructions     amLODIPine  10 mg Oral Daily     aspirin  81 mg Oral Daily     atorvastatin  80 mg Oral At Bedtime     liraglutide  0.6 mg Subcutaneous Daily     losartan  100 mg Oral Daily     metoprolol tartrate  125 mg Oral BID        PRN:  acetaminophen, bisacodyl, docusate sodium, hydrALAZINE, ipratropium - albuterol 0.5 mg/2.5 mg/3 mL, magnesium hydroxide, ondansetron, - MEDICATION INSTRUCTIONS -, polyethylene glycol, sennosides, Warfarin Therapy Reminder       PHYSICAL EXAM  Patient Vitals for the past 24 hrs:   BP Temp Temp src Pulse Heart Rate Resp SpO2   10/24/18 0821 - - - - 89 - -   10/24/18 0809 135/64 97.1  F (36.2  C) Oral - 89 16 98 %   10/23/18 1959 155/66 - - - 86 - -   10/23/18 1014 143/58 - - 96 - - -       GEN: NAD, flat affect  HEENT: NC/AT  CVS: RRR,  S1+S2, no m/r/g.  +Sternal incision healing well.  PULM: CTA b/l, no w/r/r  ABD: Soft, NT, ND, bowel sounds present.  +Umbilical hernia present.   EXT: Mild LE edema present, non-pitting, No calf tenderness b/l  Neuro: Answers appropriately, follows commands    LABS  Lab Results   Component Value Date    WBC 8.3 10/23/2018     Lab Results   Component Value Date    RBC 3.14 10/23/2018     Lab Results   Component Value Date    HGB 9.3 10/23/2018     Lab Results   Component Value Date    HCT 28.5 10/23/2018     No components found for: MCT  Lab Results   Component Value Date    MCV 91 10/23/2018     Lab Results   Component Value Date    MCH 29.6 10/23/2018     Lab Results   Component Value Date    MCHC 32.6 10/23/2018     Lab Results   Component Value Date    RDW 16.0 10/23/2018     Lab Results   Component Value Date     10/23/2018         Last Comprehensive Metabolic Panel:  Sodium   Date Value Ref Range Status   10/23/2018 138 133 - 144 mmol/L Final     Potassium   Date Value Ref Range Status   10/23/2018 3.8 3.4 - 5.3 mmol/L Final     Chloride   Date Value Ref Range Status   10/23/2018 103 94 - 109 mmol/L Final     Carbon Dioxide   Date Value Ref Range Status   10/23/2018 26 20 - 32 mmol/L Final     Anion Gap   Date Value Ref Range Status   10/23/2018 9 3 - 14 mmol/L Final     Glucose   Date Value Ref Range Status   10/23/2018 187 (H) 70 - 99 mg/dL Final     Urea Nitrogen   Date Value Ref Range Status   10/23/2018 30 7 - 30 mg/dL Final     Creatinine   Date Value Ref Range Status   10/23/2018 1.03 0.52 - 1.04 mg/dL Final     GFR Estimate   Date Value Ref Range Status   10/23/2018 56 (L) >60 mL/min/1.7m2 Final     Comment:     Non  GFR Calc     Calcium   Date Value Ref Range Status   10/23/2018 8.4 (L) 8.5 - 10.1 mg/dL Final     INR   Date Value Ref Range Status   10/24/2018 2.71 (H) 0.86 - 1.14 Final          Recent Labs  Lab 10/23/18  2214 10/23/18  1728 10/23/18  0804 10/23/18  0609  10/23/18  0213 10/22/18  2128 10/22/18  1723  10/22/18  0551  10/20/18  0623  10/19/18  0532   GLC  --   --   --  187*  --   --   --   --  243*  --  254*  --  263*   * 167* 191*  --  158* 179* 226*  < >  --   < >  --   < >  --    < > = values in this interval not displayed.    ASSESSMENT  Christina Sanchez is a 53 year old RHD womanwith a PMH including but not limited to HTN, uncontrolled diabetes (HbA1c 9.1), CAD, CHF, bilateral foot drop, and medication non-compliance who presented to [facility] on 10/1/18 for shortness of breath, chest pain, productive cough, and nausea, and found to be in CHF exacerbation and hypertensive emergency.  Hospital course was complicated by UTI and bacteremia, and found NSTEMI requiring transfer to [facility] for further treatment and workup.  Found to have 2 vessel disease, s/p CABG which was complicated by embolic CVA.  Now on Aspirin and coumadin.       Impairment group code: 01.4 No Paresis, bilateral hemispheric strokes following CABG x3      PLAN  Rehabilitation  -Continue inpatient rehabilitation including PT, OT, and speech therapies.  -Needs multidisciplinary approach including therapies, rehab nursing, social work, and physiatry.  -Now Modified independent in the room at all times with [facility]    Medical  1)Neurology  -Bilateral multiple hemispheric CVAs due to embolism in the setting of CABG                         -MRI in 6-8 weeks to follow up evolution of strokes                         -Follow up with neurology after discharge                         -ASA 81 mg and Coumadin (goal 2-3) for secondary stroke prevention.  In addition will provide ongoing education regarding risk factor modification including controlling HTN and DM, medication compliance, lifestyle and diet changes, exercise, and physician follow up  -L arm numbness                         -EMG as an outpatient.  MRI shows stenosis at the C5-6 level, however her symptoms are in the ulnar or  C8/T1 distribution     2)Cardiology  -CAD s/p NSTEMI and CABG x3                         -ASA 81 mg daily                         -Sternal precautions 3 months, no lifting >10 lbs for 6-8 weeks                         -May wash incision with soap                         -Follow up with CV surgery (Olga Lidia Mcclendon NP, appointment on 11?/3)  -HTN/CHF                         -Norvasc 10 mg daily, Cozaar 100 mg daily, Metoprolol 125 mg BID                         -Lasix discontinued due to increasing creatinine which has now returned to her baseline.  No signs of fluid overload, will continue to monitoring                         -Hydralazine PRN for SBP >160 mmHg  -HLD: Lipitor 80 mg daily  -Follow up with cardiology made on December 6 with Dr. Cantrell     3)Pulm  -Acute respiratory failure post op due to volume overload, now off supplemental oxygen.  Monitor respiratory and volume status.  -Chest tubes removed 10/5  -Duonebs q4h PRN  -Encourage incentive spirometry     4)FENGI  -Diet: Diabetic/Cardiac, regular consistency solids and thin liquids  -Sodium and potassium WNL on 10/23  -Bowel meds PRN  -Zofran PRN     5)  -PVRs WNL, may discontinue checks.  -Prior UTI and bacteremia, now completed treatment  -Prior acute on chronic kidney injury, Creatinine was increasing again but now returned to baseline after discontinuation of Lasix.  Creatinine on 10/23 1.03 which is stable.     6)DVT prophylaxis  -On Coumadin for embolic CVA, pharmacy to assist with INR monitoring (goal 2-3) and dosing  -INR continues to be therapeutic, 2.71 this morning    7)Pain  -Has not complained of pain, continue Tylenol PRN     8)Endocrine  -Uncontrolled DM  -Endocrine assistance greatly appreciated.  Januvia now discontinued and Liraglutide (Victoza) started.  Continue to monitor glucose levels, but not covering with sliding scale due to patient refusal.     9)ID  -Prior bacteremia and UTI, now completed treatment  -Monitor for signs of  infection     10)Psych  -Has expressed depression and difficulty coping.  Health psychology assistance greatly appreciated     11)Heme  -Acute blood loss anemia post-operatively, Hgb 9.6 on 10/19.  Repeat 10/23 was 9.3, overall stable.  -No evidence of active bleeding, monitor peripherally     12)MSK  -Umbilical hernia: Needs follow up with general surgery (Dr. Ambrocio) for repair in 1-2 months     13)Dispo:  -Plan for discharge home  -Rehab Prognosis: Good  -ELOS: Anticipated discharge date of 10/25 to home.     Code status: Full (discussed with patient)     Shan Merlos MD  Department of Rehabilitation Medicine  Pager: [phone]      Time Spent on this Encounter   I, Shan Merlos, spent a total of 25 minutes bedside and on the inpatient unit today managing the care of Christina Sanchez.  Over 50% of my time on the unit was spent counseling the patient and /or coordinating care regarding medication, and medical and functional progress including phone call with family.  See above note for details.

## 2018-10-24 NOTE — PLAN OF CARE
Problem: Goal/Outcome  Goal: Goal Outcome Summary  Outcome: Improving  FOCUS/GOAL  Medical management    ASSESSMENT, INTERVENTIONS AND CONTINUING PLAN FOR GOAL:  Patient slept well last night. Mod I in the room with walker. Continent of bladder and bowel. Requested tylenol early in the night for generalized pain. Medication was effective. Will cont. With MAP today.

## 2018-10-24 NOTE — PLAN OF CARE
Problem: Goal/Outcome  Goal: Goal Outcome Summary  FOCUS/GOAL  Medication management and Discharge planning    ASSESSMENT, INTERVENTIONS AND CONTINUING PLAN FOR GOAL:  Patient is alert and oriented, but forgetful. She is able to make her needs known without difficulty. She is modified independent in her room and continent of bowel and bladder. She last had a bowel movement on 10/23. Patient has been participating in MAP. She called for her medications appropriately this morning, but needed cues to place her glasses on to appropriately identify medications. She did miss one medication this morning, but corrected it when she was asked to review her medications. Patient will need oversight with medications upon discharge. She also did inject her Victoza this morning, and received education on dialing up the flex pen and injections. Patient will need to practice again tomorrow morning prior to discharge. Patient's blood glucose level this morning was 158 and it was 157 prior to lunch. She did not call for her lunch blood sugar, as she stated she did not believe that she needed to call anymore. Writer noted order later that stated that patient's blood glucose levels have been decreased to BID. This morning, writer was called because patient's drain site scab had been irritated and it was bleeding. Writer cleansed area and placed gauze dressing over both drain sites. Oncoming RN notified. She denied any difficulty with pain or discomfort this shift. Plan is for patient to discharge home tomorrow with spouse.

## 2018-10-24 NOTE — PLAN OF CARE
Problem: Goal/Outcome  Goal: Goal Outcome Summary  Patient alert and oriented, with some forgetfulness. VSS. Denies pain/SOB/numbness/nausea/dizziness. Continues to work on MAP. Patient didn't call for her 1800 med or blood sugar. Patient did call for her 2000 medication and was able to pull out correct dosage. MOD I in room. Continent of bowel and bladder. Blood glucose was 167 prior to dinner and 169 at bedtime. Bed in low position and call light within patient.

## 2018-10-24 NOTE — TELEPHONE ENCOUNTER
[facility] Call Center    Phone Message    May a detailed message be left on voicemail: no    Reason for Call: Other: [facility] called for a hopsital f/u for pt, my protocols states to send an encounter, please reach out to the pt     Action Taken: Other: Endo

## 2018-10-24 NOTE — PLAN OF CARE
Problem: Patient Care Overview  Goal: Plan of Care/Patient Progress Review  Speech Language Therapy Discharge Summary    Reason for therapy discharge:    Discharged to home with outpatient therapy.    Progress towards therapy goal(s). See goals on Care Plan in [facility] electronic health record for goal details.  Goals met    Therapy recommendation(s):    Continued therapy is recommended.  Rationale/Recommendations:  SLP: pt discharged to home, with further outpatient tx recommended to focus on higher level cognitive communication skills (thought organization, reasoning, memory/new learning ).  Pt is judged to be below baseline with further SLP tx to increased safety and idependence with IADLS.

## 2018-10-24 NOTE — PROGRESS NOTES
Diabetes Consult Daily  Progress Note          Assessment/Plan:     Ms. Christina Sanchez is a 52 yo woman with a history of uncontrolled type 2 diabetes, HTN, HLD, CHF, CKD, CAD with recent CABG x3 on 10/8/18, post operative course complicated by multiple bilateral cerebral embolic infarctions, she transferred to  [facility] on 10/18/18.  Pt refuses to take several diabetic agents (including insulin) because of past adverse reactions.    Improving glucose control, though pt not happy with liraglutide side effects.  She will continue taking it, however.    Plan:    -continue liraglutide (Victoza) 0.6mg daily  -decrease to monitor glucose BID AC       Outpatient diabetes follow up: TBD-- recommend she establish with PCP who can regularly follow for diabetes.  Plan discussed with patient           Interval History:     The last 24 hours progress and nursing notes reviewed.  Christina planning to discharge tomorrow 1100.  She cmplains of the buzzy feeling with Victoza.  Says it starts a while after she takes it.  Notices sx get worse after meals.  Eating more than she was a few days ago, but overall says still not large portions.  Discussed eating small volume food, more slowly as option for mitigating side effects.  She did take the Victoza again.  She recognizes her BG control is better.  Emphasized the benefit for the glucose control- for healing and prevention complications.  She's not sure how long she can tolerate side effects.  Suggested she take for at least one week, the usual interval we try the starting dose.  She is willing to get rx filled and check BG BID.  She has not identified an outpt PCP-  [facility].  Explained e-chart sharing should enable the provider to see our records of her hx      She has 100% coverage for Victoza.  Pt ok with this switch- discussed GLP-1 agonists with pt at length on 10/21.--IKER Collazo PA-C       PTA Regimen:  No  "medication since 6/2014.    In the past she tried (and did not tolerate):  Metformin  Pioglitazone  Glipizide  Insulin     Occasionally checks glucoses.      Recent Labs  Lab 10/24/18  1307 10/24/18  0838 10/23/18  2214 10/23/18  1728 10/23/18  0804 10/23/18  0609 10/23/18  0213  10/22/18  0551  10/20/18  0623  10/19/18  0532   GLC  --   --   --   --   --  187*  --   --  243*  --  254*  --  263*   * 158* 169* 167* 191*  --  158*  < >  --   < >  --   < >  --    < > = values in this interval not displayed.            Review of Systems:   See interval hx          Medications:       Active Diet Order      Combination Diet Regular Diet Adult; 3684-7810 Calories: Moderate Consistent CHO (4-6 CHO units/meal); 2 gm NA Diet      Diet    Physical Exam:  Gen: Alert, sitting up in chair, in NAD, wearing own clothes  HEENT: NC/AT, mucous membranes are moist  Resp: Unlabored  Neuro:oriented x3, communicating clearly  /75 (BP Location: Left arm)  Pulse 96  Temp 97.5  F (36.4  C) (Oral)  Resp 16  Ht 1.575 m (5' 2\")  Wt 82.9 kg (182 lb 11.2 oz)  SpO2 97%  BMI 33.42 kg/m2           Data:   No results found for: A1C         Recent Labs   Lab Test  10/23/18   0609  10/22/18   0551   NA  138  137   POTASSIUM  3.8  3.8   CHLORIDE  103  103   CO2  26  27   ANIONGAP  9  7   GLC  187*  243*   BUN  30  35*   CR  1.03  1.03   ADIEL  8.4*  8.5     CBC RESULTS:   Recent Labs   Lab Test  10/23/18   0609   WBC  8.3   RBC  3.14*   HGB  9.3*   HCT  28.5*   MCV  91   MCH  29.6   MCHC  32.6   RDW  16.0*   PLT  307     Shelby Shirley APRN [facility] [phone]  Diabetes Management Team job code: [phone]  I spent a total of 35 minutes bedside and on the inpatient unit managing the glycemic care of Christina Sanchez. Over 50% of my time on the unit was spent counseling the patient on benefits of improved glucose control and/or coordinating care regarding necessary outpt diabetes follow up.  See note for details.              "

## 2018-10-24 NOTE — PLAN OF CARE
Problem: Goal/Outcome  Goal: Goal Outcome Summary  Physical Therapy Discharge Summary    Reason for therapy discharge:    All goals and outcomes met, no further needs identified.    Progress towards therapy goal(s). See goals on Care Plan in [facility] electronic health record for goal details.  Goals met    Therapy recommendation(s):    Continued therapy is recommended.  Rationale/Recommendations:  Pt will benefit from continued PT for increasing functional endurance and aerobic conditioning, LE and core strength, balance kylee to progress to I mobility in the community.  Pt uses B AFOs due to B foot drop (used PTA ). Pt with sternal precautions. .Pt owns her own 4ww.

## 2018-10-25 VITALS
OXYGEN SATURATION: 95 % | SYSTOLIC BLOOD PRESSURE: 165 MMHG | RESPIRATION RATE: 16 BRPM | DIASTOLIC BLOOD PRESSURE: 77 MMHG | BODY MASS INDEX: 33.62 KG/M2 | HEART RATE: 84 BPM | HEIGHT: 62 IN | WEIGHT: 182.7 LBS | TEMPERATURE: 97.8 F

## 2018-10-25 LAB
GLUCOSE BLDC GLUCOMTR-MCNC: 153 MG/DL (ref 70–99)
INR PPP: 2.95 (ref 0.86–1.14)

## 2018-10-25 PROCEDURE — G0463 HOSPITAL OUTPT CLINIC VISIT: HCPCS

## 2018-10-25 PROCEDURE — 25000132 ZZH RX MED GY IP 250 OP 250 PS 637: Performed by: PHYSICAL MEDICINE & REHABILITATION

## 2018-10-25 PROCEDURE — 00000146 ZZHCL STATISTIC GLUCOSE BY METER IP

## 2018-10-25 PROCEDURE — 36415 COLL VENOUS BLD VENIPUNCTURE: CPT | Performed by: PHYSICAL MEDICINE & REHABILITATION

## 2018-10-25 PROCEDURE — 85610 PROTHROMBIN TIME: CPT | Performed by: PHYSICAL MEDICINE & REHABILITATION

## 2018-10-25 RX ORDER — WARFARIN SODIUM 4 MG/1
4 TABLET ORAL DAILY
Qty: 4 TABLET | Refills: 0 | Status: SHIPPED | OUTPATIENT
Start: 2018-10-25 | End: 2018-10-29

## 2018-10-25 RX ORDER — WARFARIN SODIUM 4 MG/1
4 TABLET ORAL
Status: DISCONTINUED | OUTPATIENT
Start: 2018-10-25 | End: 2018-10-25 | Stop reason: HOSPADM

## 2018-10-25 RX ORDER — SIMVASTATIN 40 MG
40 TABLET ORAL AT BEDTIME
Qty: 30 TABLET | Refills: 0 | Status: SHIPPED | OUTPATIENT
Start: 2018-10-25

## 2018-10-25 RX ADMIN — METOPROLOL TARTRATE 125 MG: 50 TABLET, FILM COATED ORAL at 08:41

## 2018-10-25 RX ADMIN — LIRAGLUTIDE 0.6 MG: 6 INJECTION SUBCUTANEOUS at 08:38

## 2018-10-25 RX ADMIN — LOSARTAN POTASSIUM 100 MG: 100 TABLET, FILM COATED ORAL at 08:42

## 2018-10-25 RX ADMIN — ASPIRIN 81 MG CHEWABLE TABLET 81 MG: 81 TABLET CHEWABLE at 08:42

## 2018-10-25 RX ADMIN — AMLODIPINE BESYLATE 10 MG: 10 TABLET ORAL at 08:41

## 2018-10-25 NOTE — PHARMACY-ANTICOAGULATION SERVICE
Clinical Pharmacy- Warfarin Discharge Note  This patient is currently on warfarin for the treatment of history of CVA post CABG, secondary stroke prevention.  INR Goal= 2-3    Warfarin PTA Regimen: New start: 10/15 5 mg 10/16 5 mg 10/17 6 mg    Anticoagulation Dose History     Recent Dosing and Labs Latest Ref Rng & Units 10/19/2018 10/20/2018 10/21/2018 10/22/2018 10/23/2018 10/24/2018 10/25/2018    ISSA IMS TEMPLATE - - 7.5 mg - - - - -    Warfarin 3 mg - 6 mg - - - - - -    Warfarin 5 mg - - - 5 mg 5 mg 5 mg 5 mg -    INR 0.86 - 1.14 1.63(H) 1.75(H) 2.38(H) 2.60(H) 2.76(H) 2.71(H) 2.95(H)        Vitamin K doses administered during the last 7 days: none  FFP administered during the last 7 days: none    Recommend discharging the patient on a warfarin regimen of 4 mg daily until next INR recheck with a prescription for warfarin 4mg tablets.      The patient should have an INR checked Monday, October 29th.    Elli Nix, ConyD, BCPS

## 2018-10-25 NOTE — DISCHARGE INSTRUCTIONS
Follow up appointments:    -- PCP in 1 week as scheduled with Dr. Mendez on Monday, October 29th at 11:00 am.  Dr. Wegener is out of town for three weeks, so scheduled with another physician on his team.    Recommendation for BMP, LFT, and INR.  Follow-up on tolerance of Simvastatin.    -- CV surgery as scheduled with Olga Lidia Mcclendon on Tuesday, October 30th at 10:00 am.    Please arrive at 9:45 am, there is free  parking available at the hospital.    [facility]  [address] [place] [address]    -- Cardiology as scheduled with Dr. Cantrell on Thursday, December 6th at 11:30 am (11:15 am arrival) at the [facility].    -- General surgery for umbilical hernia repair as scheduled with Dr. Ambrocio on December 4th at 7:15 am at [facility].  Do not eat or drink after midnight the night before.  Call Anni for any questions: [phone]    -- Neurology as scheduled with Dr. Ronquillo on Wednesday, December 19th at 12:00 pm (11:45 am arrival) at [facility].    [facility]  [facility] [place] [address]  Phone: [phone]    -- MRI brain to be scheduled by neurology at appointment on December 19th.     -- Endocrinology at [facility]. They will contact you by end of day Friday to schedule this appointment. Please call [phone] with questions.

## 2018-10-25 NOTE — PLAN OF CARE
Problem: Goal/Outcome  Goal: Goal Outcome Summary  Outcome: Improving  FOCUS/GOAL  Medical management    ASSESSMENT, INTERVENTIONS AND CONTINUING PLAN FOR GOAL:  Pt. Slept well overnight. Did not call for any assistance. No pain or SOB reported. She is mod. I in the room. Walker is bedside and the path to the bathroom is clear. She is will discharge today.

## 2018-10-25 NOTE — PROGRESS NOTES
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 Apply a thin layer of Criticaid Paste. Do not scrub paste to remove! Use a soft cloth and baby/mineral oil and gently wipe away.    Orders Written  WOC Nurse follow-up plan: signing off, skin is healed  Nursing to notify the Provider(s) and re-consult the WOC Nurse if wound(s) deteriorates or new skin concern.    Patient History  According to provider note(s):  Christina Sanchez is a 53 year old right hand dominant woman with a PMH including but not limited to HTN, uncontrolled diabetes (HbA1c 9.1), CAD, CHF, bilateral foot drop (pt states due to Atenolol, prior notes state could be due to medication side effect vs. Viral infection sequelae), and medication non-compliance who presented to [facility] on 10/1/18 for shortness of breath, chest pain, productive cough, and nausea.  She was diagnosed with a CHF exacerbation and hypertensive emergency, as BP was 221/95.  Treatment was initiated, and was also started on Zosyn for bacteremia and UTI.  Troponins were then found to be elevated and uptrending with subsequent EKG changes, and she was then transferred to [facility] for further evaluation.  At [facility], a LHC was performed which showed severe 2 vessel disease and she subsequently underwent a CABG x3 on 10/8 by Dr. Whitney.  Intra-operatively she required pressor support and transfusion of 3 units PRBCs.  Post-operative developed SILVERIO and hyponatremia for which nephrology was consulted and felt was due to diabetic nephropathy and fluid overload, and responded well to diuretics.  On POD 5 she complained of left arm numbness and other non-specific neurological  symptoms, and an MRI showed multiple bilateral cerebral embolic infarctions.  She was started on ASA and Coumadin for this, with Plavix discontinued.  Workup included a CTA of the head and neck which showed occlusion of the left PCA and 50-70% stenosis of the proximal R ICA, and an echo showed an EF of 65-70% without vegetations.  Other notable events include surgical consultation for a large umbilical hernia, which will be re-evaluated in 1-2 months.  Of note, the patient is very particular about diabetes medications including refusal of insulin and Metformin due to prior adverse effects, and also blames her current deficits on insulin she received during her hospitalization.  She says she is very brittle, and sensitive to medications in general.  She is currently on Januvia only.  She also complained of L arm numbness, and an MRI C-spine showed moderate to severe spinal canal and left foraminal narrowing at C5-6.  Outpatient EMG was recommended.     Currently she feels like she is at her baseline cognitive status since insulin was discontinued.  She denies pain or trouble swallowing.  Has baseline decreased vision and was worse a few days ago, but now back to baseline.  No problems with bowel or bladder and appetite is good.  Per nursing staff has expressed feelings of depression and would like to see health psychology.    Patient transferred to ARU on 10/18/18.    Objective Data  Containment of urine/stool: Incontinence Protocol    Active Diet Order    Active Diet Order      Combination Diet Regular Diet Adult; 9668-1204 Calories: Moderate Consistent CHO (4-6 CHO units/meal); 2 gm NA Diet      Diet    Output:   I/O last 3 completed shifts:  In: 240 [P.O.:240]  Out: -     Risk Assessment:   Sensory Perception: 4-->no impairment  Moisture: 4-->rarely moist  Activity: 4-->walks frequently  Mobility: 4-->no limitation  Nutrition: 3-->adequate  Friction and Shear: 3-->no apparent problem  Mirza Score: 22                           Labs:   Recent Labs  Lab 10/25/18  0712  10/23/18  0609   HGB  --   --  9.3*   INR 2.95*  < > 2.76*   WBC  --   --  8.3   < > = values in this interval not displayed.    Physical Exam  Skin inspection: focused buttock, perianal, perineal    Wound Location:  Perianal,  cleft  Skin is healed on assessment today.  Continue with Incontinence Protocol to keep skin intact and free from rash or breakdown.      Interventions  Current support surface: Standard  Atmos Air mattress  Current off-loading measures: Chair cushion available in room  Visual inspection of wound(s) completed  Wound Care: done per plan of care  Supplies: floor stock  Education provided: plan of care  Discussed plan of care with Patient and Nurse    Sara Rao RN, CWOCN

## 2018-10-25 NOTE — PLAN OF CARE
Problem: Patient Care Overview  Goal: Plan of Care/Patient Progress Review  FOCUS/GOAL  Bowel management and Nutrition/Feeding/Swallowing precautions    ASSESSMENT, INTERVENTIONS AND CONTINUING PLAN FOR GOAL:  AOX4, uses call light appropriately and able to make needs known. Mod I in room with walker. C of B&B. Dressing on scabbing on abdomen changed. CO muscle pain relieved with PRN tylenol. Denies SOB, and cooperative with care. Discharging on 10/25.

## 2018-10-25 NOTE — PLAN OF CARE
Problem: Goal/Outcome  Goal: Goal Outcome Summary  Outcome: Improving  Called this am for medications per MAP. Identified medications and referred to med list for actions on a few of the meds. Gave Victoza this am with minimal prompting. Up with rolling walker in room ad sarai. Discharge instructions given to patient and . Received stroke, cardiac heart failure and warfarin teaching 10/20/18. Patient to follow up at primary clinic Monday for lab draws, patient understands this. Used teach back for discharge teaching. Patient verbalized understanding with responses. Denied further questions. Discharged home at 1200, by WC.

## 2018-10-26 ENCOUNTER — AMBULATORY - HEALTHEAST (OUTPATIENT)
Dept: CARDIAC REHAB | Facility: CLINIC | Age: 53
End: 2018-10-26

## 2018-10-26 DIAGNOSIS — I21.4 NON-ST ELEVATION MI (NSTEMI) (H): ICD-10-CM

## 2018-10-26 DIAGNOSIS — Z95.1 S/P CABG X 3: ICD-10-CM

## 2018-10-29 ENCOUNTER — OFFICE VISIT (OUTPATIENT)
Dept: FAMILY MEDICINE | Facility: CLINIC | Age: 53
End: 2018-10-29
Payer: MEDICAID

## 2018-10-29 VITALS
HEART RATE: 74 BPM | DIASTOLIC BLOOD PRESSURE: 86 MMHG | OXYGEN SATURATION: 100 % | HEIGHT: 62 IN | BODY MASS INDEX: 33.45 KG/M2 | RESPIRATION RATE: 16 BRPM | SYSTOLIC BLOOD PRESSURE: 136 MMHG | TEMPERATURE: 98.3 F | WEIGHT: 181.75 LBS

## 2018-10-29 DIAGNOSIS — I25.810 CORONARY ARTERY DISEASE INVOLVING CORONARY BYPASS GRAFT OF NATIVE HEART WITHOUT ANGINA PECTORIS: ICD-10-CM

## 2018-10-29 DIAGNOSIS — I63.49 CEREBROVASCULAR ACCIDENT (CVA) DUE TO EMBOLISM OF OTHER CEREBRAL ARTERY (H): ICD-10-CM

## 2018-10-29 DIAGNOSIS — D62 ANEMIA DUE TO BLOOD LOSS, ACUTE: ICD-10-CM

## 2018-10-29 DIAGNOSIS — E11.65 UNCONTROLLED TYPE 2 DIABETES MELLITUS WITH HYPERGLYCEMIA (H): Primary | ICD-10-CM

## 2018-10-29 DIAGNOSIS — Z95.1 STATUS POST AORTO-CORONARY ARTERY BYPASS GRAFT: ICD-10-CM

## 2018-10-29 LAB
ERYTHROCYTE [DISTWIDTH] IN BLOOD BY AUTOMATED COUNT: 17.6 % (ref 10–15)
HCT VFR BLD AUTO: 31.5 % (ref 35–47)
HGB BLD-MCNC: 10.1 G/DL (ref 11.7–15.7)
INR PPP: 3.5 (ref 0.86–1.14)
MCH RBC QN AUTO: 29.9 PG (ref 26.5–33)
MCHC RBC AUTO-ENTMCNC: 32.1 G/DL (ref 31.5–36.5)
MCV RBC AUTO: 93 FL (ref 78–100)
PLATELET # BLD AUTO: 343 10E9/L (ref 150–450)
RBC # BLD AUTO: 3.38 10E12/L (ref 3.8–5.2)
WBC # BLD AUTO: 6.2 10E9/L (ref 4–11)

## 2018-10-29 PROCEDURE — 99214 OFFICE O/P EST MOD 30 MIN: CPT | Performed by: FAMILY MEDICINE

## 2018-10-29 PROCEDURE — 36415 COLL VENOUS BLD VENIPUNCTURE: CPT | Performed by: FAMILY MEDICINE

## 2018-10-29 PROCEDURE — 80053 COMPREHEN METABOLIC PANEL: CPT | Performed by: FAMILY MEDICINE

## 2018-10-29 PROCEDURE — 85027 COMPLETE CBC AUTOMATED: CPT | Performed by: FAMILY MEDICINE

## 2018-10-29 PROCEDURE — 80061 LIPID PANEL: CPT | Performed by: FAMILY MEDICINE

## 2018-10-29 PROCEDURE — 85610 PROTHROMBIN TIME: CPT | Performed by: FAMILY MEDICINE

## 2018-10-29 NOTE — MR AVS SNAPSHOT
After Visit Summary   10/29/2018    Christina Sanchez    MRN: [id number]           Patient Information     Date Of Birth          1965        Visit Information        Provider Department      10/29/2018 11:00 AM Servando Mendez MD [facility] [place]        Today's Diagnoses     Uncontrolled type 2 diabetes mellitus with hyperglycemia (H)    -  1    Cerebrovascular accident (CVA) due to embolism of other cerebral artery (H)        Coronary artery disease involving coronary bypass graft of native heart without angina pectoris        Status post aorto-coronary artery bypass graft        Anemia due to blood loss, acute           Follow-ups after your visit        Your next 10 appointments already scheduled     Nov 08, 2018 10:10 AM CST   (Arrive by 9:55 AM)   RETURN DIABETES with Nick Reyes MD   [facility] ([facility])    [address] [place] [id number] [phone]              Who to contact     If you have questions or need follow up information about today's clinic visit or your schedule please contact [facility] directly at [phone].  Normal or non-critical lab and imaging results will be communicated to you by Hemospherehart, letter or phone within 4 business days after the clinic has received the results. If you do not hear from us within 7 days, please contact the clinic through Hemospherehart or phone. If you have a critical or abnormal lab result, we will notify you by phone as soon as possible.  Submit refill requests through Yatedo or call your pharmacy and they will forward the refill request to us. Please allow 3 business days for your refill to be completed.          Additional Information About Your Visit        MyChart Information     Yatedo gives you secure access to your electronic health record. If you see a primary care provider, you can also send messages to your care team and make  "appointments. If you have questions, please call your primary care clinic.  If you do not have a primary care provider, please call [phone] and they will assist you.        Care EveryWhere ID     This is your Care EveryWhere ID. This could be used by other organizations to access your [place] medical records  [id number]        Your Vitals Were     Pulse Temperature Respirations Height Pulse Oximetry BMI (Body Mass Index)    74 98.3  F (36.8  C) (Oral) 16 5' 2\" (1.575 m) 100% 33.24 kg/m2       Blood Pressure from Last 3 Encounters:   10/29/18 136/86   10/25/18 165/77   09/09/14 160/80    Weight from Last 3 Encounters:   10/29/18 181 lb 12 oz (82.4 kg)   10/24/18 182 lb 11.2 oz (82.9 kg)   09/09/14 155 lb (70.3 kg)              We Performed the Following     CBC with platelets     Comprehensive metabolic panel     INR     Lipid panel reflex to direct LDL Fasting        Primary Care Provider Office Phone # Fax #    Darryn Daniel Irwin Wegener, [phone] [address] [facility] [address]        Goals        General    Obtaining support at work or unemployment (pt-stated)     Notes - Note created  8/12/2014  2:10 PM by Mary Espinal MSW    As of today's date 8/12/2014 goal is met at 0 - 25%.   Goal Status:  Active          Equal Access to Services     [facility] DENTON AH: Hadii antonio ku hadasho Socharlieali, waaxda luqadaha, qaybta kaalmada mai, justus bonilla. So [facility] [phone].    ATENCIÓN: Si habla español, tiene a warren disposición servicios gratuitos de asistencia lingüística. Llame al [phone].    We comply with applicable federal civil rights laws and [place] laws. We do not discriminate on the basis of race, color, national origin, age, disability, sex, sexual orientation, or gender identity.            Thank you!     Thank you for choosing [facility]  for your care. Our goal is always to provide you with excellent care. Hearing back from " our patients is one way we can continue to improve our services. Please take a few minutes to complete the written survey that you may receive in the mail after your visit with us. Thank you!             Your Updated Medication List - Protect others around you: Learn how to safely use, store and throw away your medicines at www.disposemymeds.org.          This list is accurate as of 10/29/18 11:59 PM.  Always use your most recent med list.                   Brand Name Dispense Instructions for use Diagnosis    ACETAMINOPHEN PO      Take 650 mg by mouth every 6 hours as needed for pain        amLODIPine 10 MG tablet    NORVASC    30 tablet    Take 1 tablet (10 mg) by mouth daily    Cerebrovascular accident (CVA) due to embolism of other cerebral artery (H), Hypertension goal BP (blood pressure) < 140/90       aspirin 81 MG chewable tablet     30 tablet    Take 1 tablet (81 mg) by mouth daily    Cerebrovascular accident (CVA) due to embolism of other cerebral artery (H)       bisacodyl 10 MG Suppository    DULCOLAX    10 suppository    Place 1 suppository (10 mg) rectally daily as needed for constipation    Constipation, unspecified constipation type, Cerebrovascular accident (CVA) due to embolism of other cerebral artery (H)       blood glucose monitoring lancets     200 each    Lancets that go with meter covered by insurance test 6-8 times daily    Diabetes mellitus, type 2 (H)       blood glucose monitoring meter device kit    no brand specified    1 kit    1 kit 6 times daily Meter covered by insurance    Diabetes mellitus, type 2 (H)       BLOOD GLUCOSE TEST STRIPS Strp     200 strip    Strips that go with meter covered by insurance test 6-8 times dailiy    Diabetes mellitus, type 2 (H)       docusate sodium 100 MG capsule    COLACE    60 capsule    Take 1 capsule (100 mg) by mouth 2 times daily as needed for constipation    Constipation, unspecified constipation type, Cerebrovascular accident (CVA) due to  embolism of other cerebral artery (H)       liraglutide 18 MG/3ML soln    VICTOZA    3 mL    Inject 0.6 mg Subcutaneous daily    Cerebrovascular accident (CVA) due to embolism of other cerebral artery (H), Type 2 diabetes mellitus with diabetic nephropathy, without long-term current use of insulin (H)       losartan 100 MG tablet    COZAAR    30 tablet    Take 1 tablet (100 mg) by mouth daily    Cerebrovascular accident (CVA) due to embolism of other cerebral artery (H), Hypertension goal BP (blood pressure) < 140/90       metoprolol tartrate 50 MG tablet    LOPRESSOR    150 tablet    Take 2.5 tablets (125 mg) by mouth 2 times daily    Cerebrovascular accident (CVA) due to embolism of other cerebral artery (H), Hypertension goal BP (blood pressure) < 140/90       order for DME     1 each    Equipment being ordered: Walker with four wheels, brakes and seat with basket.    Idiopathic progressive polyneuropathy, Diabetic neuropathy (H)       polyethylene glycol Packet    MIRALAX/GLYCOLAX    7 packet    Take 17 g by mouth daily as needed for constipation    Constipation, unspecified constipation type, Cerebrovascular accident (CVA) due to embolism of other cerebral artery (H)       simvastatin 40 MG tablet    ZOCOR    30 tablet    Take 1 tablet (40 mg) by mouth At Bedtime    Cerebrovascular accident (CVA) due to embolism of other cerebral artery (H)       warfarin 4 MG tablet    COUMADIN    4 tablet    Take 1 tablet (4 mg) by mouth daily for 4 days    Cerebrovascular accident (CVA) due to embolism of other cerebral artery (H), S/P CABG (coronary artery bypass graft)

## 2018-10-29 NOTE — PROGRESS NOTES
SUBJECTIVE:   Christina Sanchez is a 53 year old female who presents to clinic today for the following health issues:          Hospital Follow-up Visit:    Hospital/Nursing Home/ Rehab Facility: [facility]  Date of Admission: 10/01/2018    Date of Discharge: 10/18/2018  Reason(s) for Admission: heart attack, CHF and triple bypass surgery     Admitted to [facility] rehab  Date of Admission: 10/18/2018  Date of Discharge: 10/25/18            Problems taking medications regularly:  None       Medication changes since discharge: None       Problems adhering to non-medication therapy:  None    Summary of hospitalization:  [facility] discharge summary reviewed  [facility] discharge summary reviewed  Diagnostic Tests/Treatments reviewed.  Follow up needed: labs  Other Healthcare Providers Involved in Patient s Care:         specialist  Update since discharge: stable.      Post Discharge Medication Reconciliation: discharge medications reconciled, continue medications without change.  Plan of care communicated with patient     Coding guidelines for this visit:  Type of Medical   Decision Making Face-to-Face Visit       within 7 Days of discharge Face-to-Face Visit        within 14 days of discharge   Moderate Complexity 42504 05911   High Complexity 27711 62110          From her recent discharge summary:  Christina Sanchez is a 53 year old right hand dominant woman with a PMH including but not limited to HTN, uncontrolled diabetes (HbA1c 9.1), CAD, CHF, bilateral foot drop (pt states due to Atenolol, prior notes state could be due to medication side effect vs. Viral infection sequelae), and medication non-compliance who presented to [facility] on 10/1/18 for shortness of breath, chest pain, productive cough, and nausea.  She was diagnosed with a CHF exacerbation and hypertensive emergency, as BP was 221/95.  Treatment was initiated, and was also started on Zosyn for bacteremia and UTI.  Troponins  were then found to be elevated and uptrending with subsequent EKG changes, and she was then transferred to [facility] for further evaluation.  At [facility], a LHC was performed which showed severe 2 vessel disease and she subsequently underwent a CABG x3 on 10/8 by Dr. Whitney.  Intra-operatively she required pressor support and transfusion of 3 units PRBCs.  Post-operative developed SILVERIO and hyponatremia for which nephrology was consulted and felt was due to diabetic nephropathy and fluid overload, and responded well to diuretics.  On POD 5 she complained of left arm numbness and other non-specific neurological symptoms, and an MRI showed multiple bilateral cerebral embolic infarctions.  She was started on ASA and Coumadin for this, with Plavix discontinued.  Workup included a CTA of the head and neck which showed occlusion of the left PCA and 50-70% stenosis of the proximal R ICA, and an echo showed an EF of 65-70% without vegetations.  Other notable events include surgical consultation for a large umbilical hernia, which will be re-evaluated in 1-2 months.      Extremely sensitive to medications.   Uses victoza - blood sugar were 137 this am. Does not want any other diabetic meds.   Feels her meter may be off. It is brand new.   Twice per day checking blood sugar. Scheduled to see endocrine.   Seeing cardiologist at [facility].   Has appt with neurologist at [facility] too.   This clinic is far from her home and she is going to find a clinic closer to her home near [facility].        Problem list and histories reviewed & adjusted, as indicated.  Additional history: as documented    Labs reviewed in EPIC    Reviewed and updated as needed this visit by clinical staff       Reviewed and updated as needed this visit by Provider           Social History     Social History     Marital status: Single     Spouse name: N/A     Number of children: N/A     Years of education: N/A     Occupational History     Not  "on file.     Social History Main Topics     Smoking status: Never Smoker     Smokeless tobacco: Never Used     Alcohol use No     Drug use: No     Sexual activity: Yes     Partners: Male     Other Topics Concern     Parent/Sibling W/ Cabg, Mi Or Angioplasty Before 65f 55m? Yes     Social History Narrative     Allergies   Allergen Reactions     Shrimp Anaphylaxis     Atenolol Other (See Comments)     Loss of muscle and ability to walk     Dust Mites      Mold      Other [Seasonal Allergies]      Pollens and Flowers     Perfume      Peanuts [Nuts] GI Disturbance     Patient Active Problem List   Diagnosis     Hypertension goal BP (blood pressure) < 140/90     Hypovitaminosis D     Uncontrolled type II diabetes mellitus (H)     Hyperlipidemia with target LDL less than 100     Health Care Home     Idiopathic progressive polyneuropathy     Type 2 diabetes mellitus with diabetic nephropathy (H)     Stroke (H)     Coronary artery disease involving coronary bypass graft of native heart without angina pectoris     Status post aorto-coronary artery bypass graft     Reviewed medications, social history and  past medical and surgical history.    Review of system: for general, respiratory, CVS, GI and psychiatry negative except for noted above.     EXAM:  /86 (BP Location: Right arm, Patient Position: Sitting, Cuff Size: Adult Large)  Pulse 74  Temp 98.3  F (36.8  C) (Oral)  Resp 16  Ht 5' 2\" (1.575 m)  Wt 181 lb 12 oz (82.4 kg)  SpO2 100%  BMI 33.24 kg/m2  Constitutional: healthy, alert and no distress   Psychiatric: mentation appears normal and affect normal/bright  Cardiovascular: RRR. No murmurs,  Respiratory: negative, Lungs clear. No crackles or wheezing. No tachypnea.   umbilical hernia.   SKIN =- midline chest wall incision is healing well without any dehiscences. Upper abdomen 2 chest wall tube incisions with mild serous drainage. Minimally tender.      ASSESSMENT / PLAN:  (E11.65) Uncontrolled type 2 " diabetes mellitus with hyperglycemia (H)  (primary encounter diagnosis)  Comment: With multiple sensitivity to multiple medications.  Currently on Victoza and wishes not to use any other medications.  She is scheduled to see endocrinologist and further treatment as per endocrine.  Plan: Comprehensive metabolic panel, INR, Lipid panel        reflex to direct LDL Fasting           (I63.49) Cerebrovascular accident (CVA) due to embolism of other cerebral artery (H)  Comment: She is scheduled to see a neurologist for follow-up.  Further treatment as per neurologist.  Today's INR is supratherapeutic.  We will skip 1 dose of Coumadin today.-I will defer for how long she should be on Coumadin and further Coumadin dosing to her cardiologist and neurologist.  Plan: Comprehensive metabolic panel, INR            (I25.810) Coronary artery disease involving coronary bypass graft of native heart without angina pectoris  Comment:.  She has had coronary artery bypass graft.  She is seeing cardiothoracic surgeon tomorrow.  She is feeling okay.  Plan: Comprehensive metabolic panel, INR            (Z95.1) Status post aorto-coronary artery bypass graft  Comment:   Plan: Comprehensive metabolic panel, INR            (D62) Anemia due to blood loss, acute  Comment: She required 3 PRBC after surgery.  Her hemoglobin was still low at the time of the discharge and will recheck it today.    Plan: CBC with platelets            Follow up: With a new primary physician and all the specialists as she is already scheduled for.  She finds this clinic is far from her home and wishes to get a new primary physician closer to her home.  We wished her good luck with her future care.    The above note was dictated using voice recognition. Although reviewed after completion, some word and grammatical error may remain .

## 2018-10-30 ENCOUNTER — OFFICE VISIT - HEALTHEAST (OUTPATIENT)
Dept: CARDIOLOGY | Facility: CLINIC | Age: 53
End: 2018-10-30

## 2018-10-30 ENCOUNTER — COMMUNICATION - HEALTHEAST (OUTPATIENT)
Dept: ANTICOAGULATION | Facility: CLINIC | Age: 53
End: 2018-10-30

## 2018-10-30 DIAGNOSIS — Z95.1 S/P CABG (CORONARY ARTERY BYPASS GRAFT): ICD-10-CM

## 2018-10-30 DIAGNOSIS — Z86.73 HISTORY OF CVA (CEREBROVASCULAR ACCIDENT): ICD-10-CM

## 2018-10-30 LAB
ALBUMIN SERPL-MCNC: 3.6 G/DL (ref 3.4–5)
ALP SERPL-CCNC: 117 U/L (ref 40–150)
ALT SERPL W P-5'-P-CCNC: 22 U/L (ref 0–50)
ANION GAP SERPL CALCULATED.3IONS-SCNC: 9 MMOL/L (ref 3–14)
AST SERPL W P-5'-P-CCNC: 23 U/L (ref 0–45)
BILIRUB SERPL-MCNC: 0.6 MG/DL (ref 0.2–1.3)
BUN SERPL-MCNC: 13 MG/DL (ref 7–30)
CALCIUM SERPL-MCNC: 9.2 MG/DL (ref 8.5–10.1)
CHLORIDE SERPL-SCNC: 105 MMOL/L (ref 94–109)
CHOLEST SERPL-MCNC: 153 MG/DL
CO2 SERPL-SCNC: 25 MMOL/L (ref 20–32)
CREAT SERPL-MCNC: 0.93 MG/DL (ref 0.52–1.04)
GFR SERPL CREATININE-BSD FRML MDRD: 63 ML/MIN/1.7M2
GLUCOSE SERPL-MCNC: 164 MG/DL (ref 70–99)
HDLC SERPL-MCNC: 39 MG/DL
LDLC SERPL CALC-MCNC: 64 MG/DL
NONHDLC SERPL-MCNC: 114 MG/DL
POTASSIUM SERPL-SCNC: 4.5 MMOL/L (ref 3.4–5.3)
PROT SERPL-MCNC: 7.2 G/DL (ref 6.8–8.8)
SODIUM SERPL-SCNC: 139 MMOL/L (ref 133–144)
TRIGL SERPL-MCNC: 252 MG/DL

## 2018-10-30 ASSESSMENT — MIFFLIN-ST. JEOR: SCORE: 1378.33

## 2018-10-31 ENCOUNTER — AMBULATORY - HEALTHEAST (OUTPATIENT)
Dept: LAB | Facility: CLINIC | Age: 53
End: 2018-10-31

## 2018-10-31 ENCOUNTER — AMBULATORY - HEALTHEAST (OUTPATIENT)
Dept: CARDIOLOGY | Facility: CLINIC | Age: 53
End: 2018-10-31

## 2018-10-31 DIAGNOSIS — Z86.73 HISTORY OF CVA (CEREBROVASCULAR ACCIDENT): ICD-10-CM

## 2018-10-31 LAB — INR PPP: 2.3 (ref 0.9–1.1)

## 2018-11-01 ENCOUNTER — TELEPHONE (OUTPATIENT)
Dept: FAMILY MEDICINE | Facility: CLINIC | Age: 53
End: 2018-11-01

## 2018-11-01 ENCOUNTER — AMBULATORY - HEALTHEAST (OUTPATIENT)
Dept: CARDIOLOGY | Facility: CLINIC | Age: 53
End: 2018-11-01

## 2018-11-01 DIAGNOSIS — Z86.73 HISTORY OF CVA (CEREBROVASCULAR ACCIDENT): ICD-10-CM

## 2018-11-01 NOTE — TELEPHONE ENCOUNTER
Patient was told to find a PCP and she has not currently found one at [facility].   She is going to have her INR test done at [facility] and have them send the information to [place] for Dr. Mendez to manage her INR medication.     If there are any questions about this please f/u with patient at [phone] (home).      Justina MASSEY     [facility]

## 2018-11-01 NOTE — TELEPHONE ENCOUNTER
INR RN -     Please see message below from patient and advise  on recommendations     Thank you,   Alycia Skinner Registered Nurse   [facility]

## 2018-11-02 ENCOUNTER — COMMUNICATION - HEALTHEAST (OUTPATIENT)
Dept: CARDIOLOGY | Facility: CLINIC | Age: 53
End: 2018-11-02

## 2018-11-02 NOTE — TELEPHONE ENCOUNTER
Patient calling to find out status of this call.  Has not heard back from anyone.  Please call asap.  OK to leave message on voicemail.

## 2018-11-05 ENCOUNTER — AMBULATORY - HEALTHEAST (OUTPATIENT)
Dept: CARDIOLOGY | Facility: CLINIC | Age: 53
End: 2018-11-05

## 2018-11-05 ENCOUNTER — TRANSFERRED RECORDS (OUTPATIENT)
Dept: HEALTH INFORMATION MANAGEMENT | Facility: CLINIC | Age: 53
End: 2018-11-05

## 2018-11-05 DIAGNOSIS — I48.91 ATRIAL FIBRILLATION (H): ICD-10-CM

## 2018-11-05 LAB — INTERNATIONAL NORMALIZATION RATIO, POC - HISTORICAL: 2.1

## 2018-11-06 ENCOUNTER — AMBULATORY - HEALTHEAST (OUTPATIENT)
Dept: CARDIAC REHAB | Facility: CLINIC | Age: 53
End: 2018-11-06

## 2018-11-06 DIAGNOSIS — I21.4 NON-ST ELEVATION MI (NSTEMI) (H): ICD-10-CM

## 2018-11-06 DIAGNOSIS — Z95.1 S/P CABG (CORONARY ARTERY BYPASS GRAFT): ICD-10-CM

## 2018-11-06 DIAGNOSIS — Z95.1 S/P CABG X 3: ICD-10-CM

## 2018-11-09 ENCOUNTER — TELEPHONE (OUTPATIENT)
Dept: ENDOCRINOLOGY | Facility: CLINIC | Age: 53
End: 2018-11-09

## 2018-11-13 ENCOUNTER — TELEPHONE (OUTPATIENT)
Dept: ENDOCRINOLOGY | Facility: CLINIC | Age: 53
End: 2018-11-13

## 2018-11-13 NOTE — TELEPHONE ENCOUNTER
Patient sent in YOGITECHhart appointment request on 11/7 to schedule her hospital follow up appointment with the clinic- it looks like the message was forwarded to the clinic. A message was also sent from the call center on 11/12, scheduling protocols state to send all hospital follow up appointment request directly to the clinic for scheduling. Patient is hoping to be seen the end of this week and is hoping for a call back from the clinic today. Please reach out to her directly for scheduling as soon as possible.

## 2018-11-15 ENCOUNTER — AMBULATORY - HEALTHEAST (OUTPATIENT)
Dept: CARDIAC REHAB | Facility: CLINIC | Age: 53
End: 2018-11-15

## 2018-11-15 DIAGNOSIS — Z95.1 S/P CABG (CORONARY ARTERY BYPASS GRAFT): ICD-10-CM

## 2018-11-15 DIAGNOSIS — I21.4 NON-ST ELEVATION MI (NSTEMI) (H): ICD-10-CM

## 2018-11-19 ENCOUNTER — TRANSFERRED RECORDS (OUTPATIENT)
Dept: HEALTH INFORMATION MANAGEMENT | Facility: CLINIC | Age: 53
End: 2018-11-19

## 2018-11-19 ENCOUNTER — AMBULATORY - HEALTHEAST (OUTPATIENT)
Dept: CARDIOLOGY | Facility: CLINIC | Age: 53
End: 2018-11-19

## 2018-11-19 DIAGNOSIS — I10 ESSENTIAL HYPERTENSION: ICD-10-CM

## 2018-11-19 DIAGNOSIS — Z86.73 HISTORY OF CVA (CEREBROVASCULAR ACCIDENT): ICD-10-CM

## 2018-11-19 DIAGNOSIS — I48.91 ATRIAL FIBRILLATION (H): ICD-10-CM

## 2018-11-19 LAB — INTERNATIONAL NORMALIZATION RATIO, POC - HISTORICAL: 1.5

## 2018-11-20 ENCOUNTER — AMBULATORY - HEALTHEAST (OUTPATIENT)
Dept: CARDIAC REHAB | Facility: CLINIC | Age: 53
End: 2018-11-20

## 2018-11-20 DIAGNOSIS — Z95.1 S/P CABG (CORONARY ARTERY BYPASS GRAFT): ICD-10-CM

## 2018-11-20 DIAGNOSIS — I21.4 NON-ST ELEVATION MI (NSTEMI) (H): ICD-10-CM

## 2018-11-26 ENCOUNTER — AMBULATORY - HEALTHEAST (OUTPATIENT)
Dept: CARDIOLOGY | Facility: CLINIC | Age: 53
End: 2018-11-26

## 2018-11-26 DIAGNOSIS — I48.91 ATRIAL FIBRILLATION (H): ICD-10-CM

## 2018-11-26 LAB — INTERNATIONAL NORMALIZATION RATIO, POC - HISTORICAL: 1.9

## 2018-11-26 NOTE — TELEPHONE ENCOUNTER
LVM for pt to advise she must reschedule 1st available if unable to make her appointment. Unable to add patient on at her request.

## 2018-11-26 NOTE — TELEPHONE ENCOUNTER
[facility] Call Center    Phone Message    May a detailed message be left on voicemail: yes    Reason for Call: Other: PT would like to reschedule her hospital follow up on 11/30/18 to 12/14/18 around 11:30am.  Please follow up with the PT at number above.       Action Taken: Message routed to:  [facility] ([facility] Alejandra

## 2018-11-27 ENCOUNTER — AMBULATORY - HEALTHEAST (OUTPATIENT)
Dept: CARDIAC REHAB | Facility: CLINIC | Age: 53
End: 2018-11-27

## 2018-11-27 DIAGNOSIS — E11.9 TYPE 2 DIABETES MELLITUS WITHOUT COMPLICATION, UNSPECIFIED WHETHER LONG TERM INSULIN USE (H): Primary | ICD-10-CM

## 2018-11-27 DIAGNOSIS — Z95.1 S/P CABG X 3: ICD-10-CM

## 2018-11-27 DIAGNOSIS — I21.4 NON-ST ELEVATION MI (NSTEMI) (H): ICD-10-CM

## 2018-11-27 DIAGNOSIS — Z95.1 S/P CABG (CORONARY ARTERY BYPASS GRAFT): ICD-10-CM

## 2018-11-29 ENCOUNTER — AMBULATORY - HEALTHEAST (OUTPATIENT)
Dept: CARDIAC REHAB | Facility: CLINIC | Age: 53
End: 2018-11-29

## 2018-11-29 ENCOUNTER — PATIENT OUTREACH (OUTPATIENT)
Dept: CARE COORDINATION | Facility: CLINIC | Age: 53
End: 2018-11-29

## 2018-11-29 DIAGNOSIS — I21.4 NON-ST ELEVATION MI (NSTEMI) (H): ICD-10-CM

## 2018-11-29 DIAGNOSIS — Z95.1 S/P CABG (CORONARY ARTERY BYPASS GRAFT): ICD-10-CM

## 2018-12-04 ENCOUNTER — AMBULATORY - HEALTHEAST (OUTPATIENT)
Dept: CARDIAC REHAB | Facility: CLINIC | Age: 53
End: 2018-12-04

## 2018-12-04 DIAGNOSIS — Z95.1 S/P CABG X 3: ICD-10-CM

## 2018-12-04 DIAGNOSIS — I21.4 NON-ST ELEVATION MI (NSTEMI) (H): ICD-10-CM

## 2018-12-04 DIAGNOSIS — Z95.1 S/P CABG (CORONARY ARTERY BYPASS GRAFT): ICD-10-CM

## 2018-12-06 ENCOUNTER — TRANSFERRED RECORDS (OUTPATIENT)
Dept: HEALTH INFORMATION MANAGEMENT | Facility: CLINIC | Age: 53
End: 2018-12-06

## 2018-12-06 ENCOUNTER — AMBULATORY - HEALTHEAST (OUTPATIENT)
Dept: CARDIAC REHAB | Facility: CLINIC | Age: 53
End: 2018-12-06

## 2018-12-06 ENCOUNTER — OFFICE VISIT - HEALTHEAST (OUTPATIENT)
Dept: CARDIOLOGY | Facility: CLINIC | Age: 53
End: 2018-12-06

## 2018-12-06 DIAGNOSIS — I25.10 CORONARY ARTERY DISEASE INVOLVING NATIVE CORONARY ARTERY OF NATIVE HEART WITHOUT ANGINA PECTORIS: ICD-10-CM

## 2018-12-06 DIAGNOSIS — Z95.1 S/P CABG (CORONARY ARTERY BYPASS GRAFT): ICD-10-CM

## 2018-12-06 DIAGNOSIS — E78.5 DYSLIPIDEMIA: ICD-10-CM

## 2018-12-06 DIAGNOSIS — I21.4 NON-ST ELEVATION MI (NSTEMI) (H): ICD-10-CM

## 2018-12-06 DIAGNOSIS — I10 ESSENTIAL HYPERTENSION: ICD-10-CM

## 2018-12-06 ASSESSMENT — MIFFLIN-ST. JEOR: SCORE: 1323.9

## 2018-12-10 ENCOUNTER — TRANSFERRED RECORDS (OUTPATIENT)
Dept: HEALTH INFORMATION MANAGEMENT | Facility: CLINIC | Age: 53
End: 2018-12-10

## 2018-12-10 ENCOUNTER — TELEPHONE (OUTPATIENT)
Dept: FAMILY MEDICINE | Facility: CLINIC | Age: 53
End: 2018-12-10

## 2018-12-10 ENCOUNTER — AMBULATORY - HEALTHEAST (OUTPATIENT)
Dept: CARDIOLOGY | Facility: CLINIC | Age: 53
End: 2018-12-10

## 2018-12-10 DIAGNOSIS — I48.91 ATRIAL FIBRILLATION (H): ICD-10-CM

## 2018-12-10 LAB — INTERNATIONAL NORMALIZATION RATIO, POC - HISTORICAL: 1.9

## 2018-12-11 ENCOUNTER — AMBULATORY - HEALTHEAST (OUTPATIENT)
Dept: CARDIAC REHAB | Facility: CLINIC | Age: 53
End: 2018-12-11

## 2018-12-11 ENCOUNTER — COMMUNICATION - HEALTHEAST (OUTPATIENT)
Dept: CARDIOLOGY | Facility: CLINIC | Age: 53
End: 2018-12-11

## 2018-12-11 DIAGNOSIS — Z95.1 S/P CABG (CORONARY ARTERY BYPASS GRAFT): ICD-10-CM

## 2018-12-11 DIAGNOSIS — I21.4 NON-ST ELEVATION MI (NSTEMI) (H): ICD-10-CM

## 2018-12-11 DIAGNOSIS — I48.91 ATRIAL FIBRILLATION (H): ICD-10-CM

## 2018-12-11 DIAGNOSIS — I25.110 CORONARY ARTERY DISEASE INVOLVING NATIVE HEART WITH UNSTABLE ANGINA PECTORIS, UNSPECIFIED VESSEL OR LESION TYPE (H): ICD-10-CM

## 2018-12-12 ENCOUNTER — MYC MEDICAL ADVICE (OUTPATIENT)
Dept: FAMILY MEDICINE | Facility: CLINIC | Age: 53
End: 2018-12-12

## 2018-12-12 DIAGNOSIS — E11.21 TYPE 2 DIABETES MELLITUS WITH DIABETIC NEPHROPATHY (H): Primary | ICD-10-CM

## 2018-12-12 DIAGNOSIS — E11.9 TYPE 2 DIABETES MELLITUS WITHOUT COMPLICATION, UNSPECIFIED WHETHER LONG TERM INSULIN USE (H): ICD-10-CM

## 2018-12-12 NOTE — TELEPHONE ENCOUNTER
Request for contour glucose monitoring strips     Pending Prescriptions:                       Disp   Refills    blood glucose monitoring (NO BRAND SPECIF*50 str*0            Sig: Use to test blood sugars 4 times daily or as           directed    Refilled per [facility] protocol     Patient will be following up with new pcp after this request per my chart message       Alycia Skinner Registered Nurse   [facility]

## 2018-12-12 NOTE — TELEPHONE ENCOUNTER
Request for new glucose monitor and strips - filled per [facility] protocol     Patient will f/u with new pcp for further refills     Alycia Skinner Registered Nurse   [facility]

## 2018-12-13 ENCOUNTER — AMBULATORY - HEALTHEAST (OUTPATIENT)
Dept: CARDIAC REHAB | Facility: CLINIC | Age: 53
End: 2018-12-13

## 2018-12-13 DIAGNOSIS — Z95.1 S/P CABG (CORONARY ARTERY BYPASS GRAFT): ICD-10-CM

## 2018-12-13 DIAGNOSIS — I21.4 NON-ST ELEVATION MI (NSTEMI) (H): ICD-10-CM

## 2018-12-17 ENCOUNTER — AMBULATORY - HEALTHEAST (OUTPATIENT)
Dept: CARDIOLOGY | Facility: CLINIC | Age: 53
End: 2018-12-17

## 2018-12-17 ENCOUNTER — HOSPITAL ENCOUNTER (OUTPATIENT)
Dept: CARDIOLOGY | Facility: CLINIC | Age: 53
Discharge: HOME | End: 2018-12-17
Attending: INTERNAL MEDICINE

## 2018-12-17 DIAGNOSIS — I10 ESSENTIAL HYPERTENSION: ICD-10-CM

## 2018-12-17 DIAGNOSIS — I48.91 ATRIAL FIBRILLATION (H): ICD-10-CM

## 2018-12-17 DIAGNOSIS — I25.10 CORONARY ARTERY DISEASE INVOLVING NATIVE CORONARY ARTERY OF NATIVE HEART WITHOUT ANGINA PECTORIS: ICD-10-CM

## 2018-12-17 LAB
AORTIC ROOT: 2.5 CM
BSA FOR ECHO PROCEDURE: 1.85 M2
CV BLOOD PRESSURE: ABNORMAL MMHG
CV ECHO HEIGHT: 62 IN
CV ECHO WEIGHT: 172 LBS
DOP CALC LVOT AREA: 2.54 CM2
DOP CALC LVOT DIAMETER: 1.8 CM
DOP CALC LVOT PEAK VEL: 94 CM/S
DOP CALC LVOT STROKE VOLUME: 64.1 CM3
DOP CALCLVOT PEAK VEL VTI: 25.2 CM
EJECTION FRACTION: 64 % (ref 55–75)
FRACTIONAL SHORTENING: 29.3 % (ref 28–44)
INTERNATIONAL NORMALIZATION RATIO, POC - HISTORICAL: 1.5
INTERVENTRICULAR SEPTUM IN END DIASTOLE: 1.25 CM (ref 0.6–0.9)
IVS/PW RATIO: 1.3
LA AREA 1: 13.9 CM2
LA AREA 2: 15.5 CM2
LEFT ATRIUM LENGTH: 4.44 CM
LEFT ATRIUM SIZE: 3.5 CM
LEFT ATRIUM TO AORTIC ROOT RATIO: 1.4 NO UNITS
LEFT ATRIUM VOLUME INDEX: 22.3 ML/M2
LEFT ATRIUM VOLUME: 41.2 ML
LEFT VENTRICLE DIASTOLIC VOLUME INDEX: 43.2 CM3/M2 (ref 29–61)
LEFT VENTRICLE DIASTOLIC VOLUME: 80 CM3 (ref 46–106)
LEFT VENTRICLE MASS INDEX: 69.9 G/M2
LEFT VENTRICLE SYSTOLIC VOLUME INDEX: 15.7 CM3/M2 (ref 8–24)
LEFT VENTRICLE SYSTOLIC VOLUME: 29 CM3 (ref 14–42)
LEFT VENTRICULAR INTERNAL DIMENSION IN DIASTOLE: 3.68 CM (ref 3.8–5.2)
LEFT VENTRICULAR INTERNAL DIMENSION IN SYSTOLE: 2.6 CM (ref 2.2–3.5)
LEFT VENTRICULAR MASS: 129.2 G
LEFT VENTRICULAR OUTFLOW TRACT MEAN GRADIENT: 2 MMHG
LEFT VENTRICULAR OUTFLOW TRACT MEAN VELOCITY: 73.9 CM/S
LEFT VENTRICULAR OUTFLOW TRACT PEAK GRADIENT: 4 MMHG
LEFT VENTRICULAR POSTERIOR WALL IN END DIASTOLE: 0.96 CM (ref 0.6–0.9)
LV STROKE VOLUME INDEX: 34.6 ML/M2
MITRAL VALVE E/A RATIO: 0.8
MV DECELERATION TIME: 317 MS
MV DECELERATION TIME: 317 MS
MV E'TISSUE VEL-LAT: 7.99 CM/S
MV E'TISSUE VEL-LAT: 7.99 CM/S
MV E'TISSUE VEL-MED: 5.65 CM/S
MV E'TISSUE VEL-MED: 5.65 CM/S
MV PEAK A VELOCITY: 85.4 CM/S
MV PEAK E VELOCITY: 69.1 CM/S
NUC REST DIASTOLIC VOLUME INDEX: 2752 LBS
NUC REST SYSTOLIC VOLUME INDEX: 62 IN
TRICUSPID REGURGITATION PEAK PRESSURE GRADIENT: 28.5 MMHG
TRICUSPID VALVE ANULAR PLANE SYSTOLIC EXCURSION: 2.3 CM
TRICUSPID VALVE PEAK REGURGITANT VELOCITY: 267 CM/S

## 2018-12-17 ASSESSMENT — MIFFLIN-ST. JEOR: SCORE: 1328.44

## 2018-12-18 ENCOUNTER — AMBULATORY - HEALTHEAST (OUTPATIENT)
Dept: CARDIAC REHAB | Facility: CLINIC | Age: 53
End: 2018-12-18

## 2018-12-18 ENCOUNTER — COMMUNICATION - HEALTHEAST (OUTPATIENT)
Dept: CARDIOLOGY | Facility: CLINIC | Age: 53
End: 2018-12-18

## 2018-12-18 ENCOUNTER — OFFICE VISIT - HEALTHEAST (OUTPATIENT)
Dept: FAMILY MEDICINE | Facility: CLINIC | Age: 53
End: 2018-12-18

## 2018-12-18 DIAGNOSIS — Z12.11 SCREEN FOR COLON CANCER: ICD-10-CM

## 2018-12-18 DIAGNOSIS — E11.8 TYPE 2 DIABETES MELLITUS WITH COMPLICATION, WITHOUT LONG-TERM CURRENT USE OF INSULIN (H): ICD-10-CM

## 2018-12-18 DIAGNOSIS — I10 ESSENTIAL HYPERTENSION: ICD-10-CM

## 2018-12-18 DIAGNOSIS — Z76.89 ENCOUNTER TO ESTABLISH CARE: ICD-10-CM

## 2018-12-18 DIAGNOSIS — I21.4 NON-ST ELEVATION MI (NSTEMI) (H): ICD-10-CM

## 2018-12-18 DIAGNOSIS — Z95.1 S/P CABG (CORONARY ARTERY BYPASS GRAFT): ICD-10-CM

## 2018-12-18 DIAGNOSIS — I16.1 HYPERTENSIVE EMERGENCY: ICD-10-CM

## 2018-12-18 DIAGNOSIS — I25.110 CORONARY ARTERY DISEASE INVOLVING NATIVE HEART WITH UNSTABLE ANGINA PECTORIS, UNSPECIFIED VESSEL OR LESION TYPE (H): ICD-10-CM

## 2018-12-18 DIAGNOSIS — Z95.1 S/P CABG X 3: ICD-10-CM

## 2018-12-18 DIAGNOSIS — Z12.31 VISIT FOR SCREENING MAMMOGRAM: ICD-10-CM

## 2018-12-18 LAB
ALBUMIN SERPL-MCNC: 4.1 G/DL (ref 3.5–5)
ALP SERPL-CCNC: 123 U/L (ref 45–120)
ALT SERPL W P-5'-P-CCNC: 30 U/L (ref 0–45)
ANION GAP SERPL CALCULATED.3IONS-SCNC: 11 MMOL/L (ref 5–18)
AST SERPL W P-5'-P-CCNC: 26 U/L (ref 0–40)
BILIRUB SERPL-MCNC: 0.5 MG/DL (ref 0–1)
BUN SERPL-MCNC: 19 MG/DL (ref 8–22)
CALCIUM SERPL-MCNC: 9.7 MG/DL (ref 8.5–10.5)
CHLORIDE BLD-SCNC: 102 MMOL/L (ref 98–107)
CO2 SERPL-SCNC: 24 MMOL/L (ref 22–31)
CREAT SERPL-MCNC: 0.83 MG/DL (ref 0.6–1.1)
ERYTHROCYTE [DISTWIDTH] IN BLOOD BY AUTOMATED COUNT: 12.8 % (ref 11–14.5)
GFR SERPL CREATININE-BSD FRML MDRD: >60 ML/MIN/1.73M2
GLUCOSE BLD-MCNC: 124 MG/DL (ref 70–125)
HBA1C MFR BLD: 6.5 % (ref 3.5–6)
HCT VFR BLD AUTO: 38.2 % (ref 35–47)
HGB BLD-MCNC: 12.9 G/DL (ref 12–16)
MCH RBC QN AUTO: 29.9 PG (ref 27–34)
MCHC RBC AUTO-ENTMCNC: 33.7 G/DL (ref 32–36)
MCV RBC AUTO: 89 FL (ref 80–100)
PLATELET # BLD AUTO: 279 THOU/UL (ref 140–440)
PMV BLD AUTO: 7.2 FL (ref 7–10)
POTASSIUM BLD-SCNC: 4.4 MMOL/L (ref 3.5–5)
PROT SERPL-MCNC: 7.2 G/DL (ref 6–8)
RBC # BLD AUTO: 4.3 MILL/UL (ref 3.8–5.4)
SODIUM SERPL-SCNC: 137 MMOL/L (ref 136–145)
TSH SERPL DL<=0.005 MIU/L-ACNC: 1.51 UIU/ML (ref 0.3–5)
WBC: 4.8 THOU/UL (ref 4–11)

## 2018-12-18 ASSESSMENT — MIFFLIN-ST. JEOR: SCORE: 1296.69

## 2018-12-21 ENCOUNTER — COMMUNICATION - HEALTHEAST (OUTPATIENT)
Dept: PHARMACY | Facility: CLINIC | Age: 53
End: 2018-12-21

## 2018-12-24 ENCOUNTER — AMBULATORY - HEALTHEAST (OUTPATIENT)
Dept: CARDIOLOGY | Facility: CLINIC | Age: 53
End: 2018-12-24

## 2018-12-24 ENCOUNTER — TRANSFERRED RECORDS (OUTPATIENT)
Dept: HEALTH INFORMATION MANAGEMENT | Facility: CLINIC | Age: 53
End: 2018-12-24

## 2018-12-24 DIAGNOSIS — I48.91 ATRIAL FIBRILLATION (H): ICD-10-CM

## 2018-12-24 LAB — INTERNATIONAL NORMALIZATION RATIO, POC - HISTORICAL: 1.7

## 2018-12-29 ENCOUNTER — COMMUNICATION - HEALTHEAST (OUTPATIENT)
Dept: FAMILY MEDICINE | Facility: CLINIC | Age: 53
End: 2018-12-29

## 2019-01-02 ENCOUNTER — AMBULATORY - HEALTHEAST (OUTPATIENT)
Dept: CARDIOLOGY | Facility: CLINIC | Age: 54
End: 2019-01-02

## 2019-01-02 ENCOUNTER — COMMUNICATION - HEALTHEAST (OUTPATIENT)
Dept: CARDIOLOGY | Facility: CLINIC | Age: 54
End: 2019-01-02

## 2019-01-02 DIAGNOSIS — I48.91 ATRIAL FIBRILLATION (H): ICD-10-CM

## 2019-01-02 LAB — POC INR - HE - HISTORICAL: 1.6 (ref 0.9–1.1)

## 2019-01-03 ENCOUNTER — AMBULATORY - HEALTHEAST (OUTPATIENT)
Dept: CARDIAC REHAB | Facility: CLINIC | Age: 54
End: 2019-01-03

## 2019-01-03 ENCOUNTER — COMMUNICATION - HEALTHEAST (OUTPATIENT)
Dept: CARDIOLOGY | Facility: CLINIC | Age: 54
End: 2019-01-03

## 2019-01-03 ENCOUNTER — COMMUNICATION - HEALTHEAST (OUTPATIENT)
Dept: FAMILY MEDICINE | Facility: CLINIC | Age: 54
End: 2019-01-03

## 2019-01-03 DIAGNOSIS — I21.4 NON-ST ELEVATION MI (NSTEMI) (H): ICD-10-CM

## 2019-01-03 DIAGNOSIS — Z95.1 S/P CABG (CORONARY ARTERY BYPASS GRAFT): ICD-10-CM

## 2019-01-04 ENCOUNTER — COMMUNICATION - HEALTHEAST (OUTPATIENT)
Dept: FAMILY MEDICINE | Facility: CLINIC | Age: 54
End: 2019-01-04

## 2019-01-04 ENCOUNTER — TELEPHONE (OUTPATIENT)
Dept: FAMILY MEDICINE | Facility: CLINIC | Age: 54
End: 2019-01-04

## 2019-01-04 NOTE — TELEPHONE ENCOUNTER
Progress notes PT signed and date by provider faxed back to [facility] fax # [phone]

## 2019-01-07 ENCOUNTER — RECORDS - HEALTHEAST (OUTPATIENT)
Dept: BONE DENSITY | Facility: CLINIC | Age: 54
End: 2019-01-07

## 2019-01-07 ENCOUNTER — RECORDS - HEALTHEAST (OUTPATIENT)
Dept: ADMINISTRATIVE | Facility: OTHER | Age: 54
End: 2019-01-07

## 2019-01-07 ENCOUNTER — AMBULATORY - HEALTHEAST (OUTPATIENT)
Dept: CARDIOLOGY | Facility: CLINIC | Age: 54
End: 2019-01-07

## 2019-01-07 DIAGNOSIS — Z12.31 ENCOUNTER FOR SCREENING MAMMOGRAM FOR MALIGNANT NEOPLASM OF BREAST: ICD-10-CM

## 2019-01-07 DIAGNOSIS — I48.91 ATRIAL FIBRILLATION (H): ICD-10-CM

## 2019-01-07 LAB — INTERNATIONAL NORMALIZATION RATIO, POC - HISTORICAL: 1.7

## 2019-01-08 ENCOUNTER — AMBULATORY - HEALTHEAST (OUTPATIENT)
Dept: CARDIAC REHAB | Facility: CLINIC | Age: 54
End: 2019-01-08

## 2019-01-08 DIAGNOSIS — I21.4 NON-ST ELEVATION MI (NSTEMI) (H): ICD-10-CM

## 2019-01-08 DIAGNOSIS — Z95.1 S/P CABG (CORONARY ARTERY BYPASS GRAFT): ICD-10-CM

## 2019-01-10 ENCOUNTER — AMBULATORY - HEALTHEAST (OUTPATIENT)
Dept: CARDIAC REHAB | Facility: CLINIC | Age: 54
End: 2019-01-10

## 2019-01-10 ENCOUNTER — COMMUNICATION - HEALTHEAST (OUTPATIENT)
Dept: FAMILY MEDICINE | Facility: CLINIC | Age: 54
End: 2019-01-10

## 2019-01-10 DIAGNOSIS — I21.4 NON-ST ELEVATION MI (NSTEMI) (H): ICD-10-CM

## 2019-01-10 DIAGNOSIS — Z95.1 S/P CABG (CORONARY ARTERY BYPASS GRAFT): ICD-10-CM

## 2019-01-11 ENCOUNTER — COMMUNICATION - HEALTHEAST (OUTPATIENT)
Dept: FAMILY MEDICINE | Facility: CLINIC | Age: 54
End: 2019-01-11

## 2019-01-14 ENCOUNTER — AMBULATORY - HEALTHEAST (OUTPATIENT)
Dept: CARDIOLOGY | Facility: CLINIC | Age: 54
End: 2019-01-14

## 2019-01-14 ENCOUNTER — COMMUNICATION - HEALTHEAST (OUTPATIENT)
Dept: FAMILY MEDICINE | Facility: CLINIC | Age: 54
End: 2019-01-14

## 2019-01-14 DIAGNOSIS — I48.91 ATRIAL FIBRILLATION (H): ICD-10-CM

## 2019-01-14 DIAGNOSIS — Z00.00 HEALTHCARE MAINTENANCE: ICD-10-CM

## 2019-01-14 LAB — INTERNATIONAL NORMALIZATION RATIO, POC - HISTORICAL: 1.3

## 2019-01-15 ENCOUNTER — AMBULATORY - HEALTHEAST (OUTPATIENT)
Dept: CARDIAC REHAB | Facility: CLINIC | Age: 54
End: 2019-01-15

## 2019-01-15 ENCOUNTER — COMMUNICATION - HEALTHEAST (OUTPATIENT)
Dept: FAMILY MEDICINE | Facility: CLINIC | Age: 54
End: 2019-01-15

## 2019-01-15 DIAGNOSIS — I21.4 NON-ST ELEVATION MI (NSTEMI) (H): ICD-10-CM

## 2019-01-15 DIAGNOSIS — Z95.1 S/P CABG (CORONARY ARTERY BYPASS GRAFT): ICD-10-CM

## 2019-01-16 ENCOUNTER — COMMUNICATION - HEALTHEAST (OUTPATIENT)
Dept: CARDIOLOGY | Facility: CLINIC | Age: 54
End: 2019-01-16

## 2019-01-17 ENCOUNTER — HOSPITAL ENCOUNTER (OUTPATIENT)
Dept: MAMMOGRAPHY | Facility: CLINIC | Age: 54
Discharge: HOME | End: 2019-01-17

## 2019-01-17 DIAGNOSIS — Z12.31 VISIT FOR SCREENING MAMMOGRAM: ICD-10-CM

## 2019-01-18 ENCOUNTER — COMMUNICATION - HEALTHEAST (OUTPATIENT)
Dept: MAMMOGRAPHY | Facility: CLINIC | Age: 54
End: 2019-01-18

## 2019-01-23 ENCOUNTER — AMBULATORY - HEALTHEAST (OUTPATIENT)
Dept: LAB | Facility: CLINIC | Age: 54
End: 2019-01-23

## 2019-01-23 ENCOUNTER — AMBULATORY - HEALTHEAST (OUTPATIENT)
Dept: CARDIOLOGY | Facility: CLINIC | Age: 54
End: 2019-01-23

## 2019-01-23 ENCOUNTER — COMMUNICATION - HEALTHEAST (OUTPATIENT)
Dept: FAMILY MEDICINE | Facility: CLINIC | Age: 54
End: 2019-01-23

## 2019-01-23 DIAGNOSIS — I48.91 A-FIB (H): ICD-10-CM

## 2019-01-23 DIAGNOSIS — Z86.73 HISTORY OF CVA (CEREBROVASCULAR ACCIDENT): ICD-10-CM

## 2019-01-23 DIAGNOSIS — I48.91 ATRIAL FIBRILLATION (H): ICD-10-CM

## 2019-01-23 DIAGNOSIS — E78.5 DYSLIPIDEMIA: ICD-10-CM

## 2019-01-23 DIAGNOSIS — Z00.00 HEALTHCARE MAINTENANCE: ICD-10-CM

## 2019-01-23 LAB
ALBUMIN SERPL-MCNC: 3.8 G/DL (ref 3.5–5)
ALP SERPL-CCNC: 90 U/L (ref 45–120)
ALT SERPL W P-5'-P-CCNC: 22 U/L (ref 0–45)
AST SERPL W P-5'-P-CCNC: 19 U/L (ref 0–40)
BILIRUB DIRECT SERPL-MCNC: 0.1 MG/DL
BILIRUB SERPL-MCNC: 0.5 MG/DL (ref 0–1)
CHOLEST SERPL-MCNC: 255 MG/DL
FASTING STATUS PATIENT QL REPORTED: YES
HDLC SERPL-MCNC: 49 MG/DL
INR PPP: 1.3 (ref 0.9–1.1)
LDLC SERPL CALC-MCNC: 172 MG/DL
PROT SERPL-MCNC: 6.5 G/DL (ref 6–8)
TRIGL SERPL-MCNC: 169 MG/DL
VIT B12 SERPL-MCNC: 368 PG/ML (ref 213–816)

## 2019-01-24 ENCOUNTER — COMMUNICATION - HEALTHEAST (OUTPATIENT)
Dept: FAMILY MEDICINE | Facility: CLINIC | Age: 54
End: 2019-01-24

## 2019-01-24 ENCOUNTER — AMBULATORY - HEALTHEAST (OUTPATIENT)
Dept: FAMILY MEDICINE | Facility: CLINIC | Age: 54
End: 2019-01-24

## 2019-01-24 DIAGNOSIS — E55.9 VITAMIN D INSUFFICIENCY: ICD-10-CM

## 2019-01-24 LAB
25(OH)D3 SERPL-MCNC: 13.5 NG/ML (ref 30–80)
25(OH)D3 SERPL-MCNC: 13.5 NG/ML (ref 30–80)

## 2019-01-27 ENCOUNTER — COMMUNICATION - HEALTHEAST (OUTPATIENT)
Dept: CARDIOLOGY | Facility: CLINIC | Age: 54
End: 2019-01-27

## 2019-01-27 DIAGNOSIS — I48.91 ATRIAL FIBRILLATION (H): ICD-10-CM

## 2019-01-28 ENCOUNTER — COMMUNICATION - HEALTHEAST (OUTPATIENT)
Dept: CARDIOLOGY | Facility: CLINIC | Age: 54
End: 2019-01-28

## 2019-01-28 ENCOUNTER — AMBULATORY - HEALTHEAST (OUTPATIENT)
Dept: CARDIOLOGY | Facility: CLINIC | Age: 54
End: 2019-01-28

## 2019-01-28 DIAGNOSIS — I48.91 ATRIAL FIBRILLATION (H): ICD-10-CM

## 2019-01-28 LAB — INTERNATIONAL NORMALIZATION RATIO, POC - HISTORICAL: 1.6

## 2019-01-30 ENCOUNTER — COMMUNICATION - HEALTHEAST (OUTPATIENT)
Dept: CARDIOLOGY | Facility: CLINIC | Age: 54
End: 2019-01-30

## 2019-01-31 ENCOUNTER — COMMUNICATION - HEALTHEAST (OUTPATIENT)
Dept: FAMILY MEDICINE | Facility: CLINIC | Age: 54
End: 2019-01-31

## 2019-02-01 ENCOUNTER — TELEPHONE (OUTPATIENT)
Dept: FAMILY MEDICINE | Facility: CLINIC | Age: 54
End: 2019-02-01

## 2019-02-01 ENCOUNTER — HOSPITAL ENCOUNTER (OUTPATIENT)
Dept: MAMMOGRAPHY | Facility: CLINIC | Age: 54
Discharge: HOME | End: 2019-02-01

## 2019-02-01 DIAGNOSIS — R92.0 BREAST MICROCALCIFICATIONS: ICD-10-CM

## 2019-02-04 ENCOUNTER — AMBULATORY - HEALTHEAST (OUTPATIENT)
Dept: CARDIOLOGY | Facility: CLINIC | Age: 54
End: 2019-02-04

## 2019-02-04 ENCOUNTER — COMMUNICATION - HEALTHEAST (OUTPATIENT)
Dept: FAMILY MEDICINE | Facility: CLINIC | Age: 54
End: 2019-02-04

## 2019-02-04 DIAGNOSIS — I48.91 ATRIAL FIBRILLATION (H): ICD-10-CM

## 2019-02-04 LAB — INTERNATIONAL NORMALIZATION RATIO, POC - HISTORICAL: 2.4

## 2019-02-08 ENCOUNTER — COMMUNICATION - HEALTHEAST (OUTPATIENT)
Dept: CARDIOLOGY | Facility: CLINIC | Age: 54
End: 2019-02-08

## 2019-02-08 ENCOUNTER — OFFICE VISIT - HEALTHEAST (OUTPATIENT)
Dept: CARDIOLOGY | Facility: CLINIC | Age: 54
End: 2019-02-08

## 2019-02-08 DIAGNOSIS — I25.10 CORONARY ARTERY DISEASE INVOLVING NATIVE CORONARY ARTERY OF NATIVE HEART WITHOUT ANGINA PECTORIS: ICD-10-CM

## 2019-02-08 DIAGNOSIS — E78.5 DYSLIPIDEMIA: ICD-10-CM

## 2019-02-08 DIAGNOSIS — I10 ESSENTIAL HYPERTENSION: ICD-10-CM

## 2019-02-08 LAB
ANION GAP SERPL CALCULATED.3IONS-SCNC: 11 MMOL/L (ref 5–18)
BUN SERPL-MCNC: 26 MG/DL (ref 8–22)
CALCIUM SERPL-MCNC: 9.6 MG/DL (ref 8.5–10.5)
CHLORIDE BLD-SCNC: 104 MMOL/L (ref 98–107)
CO2 SERPL-SCNC: 22 MMOL/L (ref 22–31)
CREAT SERPL-MCNC: 0.92 MG/DL (ref 0.6–1.1)
GFR SERPL CREATININE-BSD FRML MDRD: >60 ML/MIN/1.73M2
GLUCOSE BLD-MCNC: 133 MG/DL (ref 70–125)
POTASSIUM BLD-SCNC: 4.6 MMOL/L (ref 3.5–5)
SODIUM SERPL-SCNC: 137 MMOL/L (ref 136–145)

## 2019-02-08 ASSESSMENT — MIFFLIN-ST. JEOR: SCORE: 1301.22

## 2019-02-13 ENCOUNTER — COMMUNICATION - HEALTHEAST (OUTPATIENT)
Dept: CARDIOLOGY | Facility: CLINIC | Age: 54
End: 2019-02-13

## 2019-02-13 DIAGNOSIS — I10 ESSENTIAL HYPERTENSION: ICD-10-CM

## 2019-02-14 ENCOUNTER — HOSPITAL ENCOUNTER (OUTPATIENT)
Dept: MAMMOGRAPHY | Facility: CLINIC | Age: 54
Discharge: HOME | End: 2019-02-14

## 2019-02-14 ENCOUNTER — HOSPITAL ENCOUNTER (OUTPATIENT)
Dept: MAMMOGRAPHY | Facility: CLINIC | Age: 54
Discharge: HOME | End: 2019-02-14
Admitting: RADIOLOGY

## 2019-02-14 DIAGNOSIS — R92.0 BREAST MICROCALCIFICATIONS: ICD-10-CM

## 2019-02-15 ENCOUNTER — COMMUNICATION - HEALTHEAST (OUTPATIENT)
Dept: MAMMOGRAPHY | Facility: CLINIC | Age: 54
End: 2019-02-15

## 2019-02-15 LAB
LAB AP CHARGES (HE HISTORICAL CONVERSION): NORMAL
PATH REPORT.COMMENTS IMP SPEC: NORMAL
PATH REPORT.COMMENTS IMP SPEC: NORMAL
PATH REPORT.FINAL DX SPEC: NORMAL
PATH REPORT.GROSS SPEC: NORMAL
PATH REPORT.MICROSCOPIC SPEC OTHER STN: NORMAL
PATH REPORT.RELEVANT HX SPEC: NORMAL
RESULT FLAG (HE HISTORICAL CONVERSION): NORMAL

## 2019-02-18 ENCOUNTER — COMMUNICATION - HEALTHEAST (OUTPATIENT)
Dept: MAMMOGRAPHY | Facility: CLINIC | Age: 54
End: 2019-02-18

## 2019-02-18 ENCOUNTER — COMMUNICATION - HEALTHEAST (OUTPATIENT)
Dept: CARDIOLOGY | Facility: CLINIC | Age: 54
End: 2019-02-18

## 2019-02-25 ENCOUNTER — COMMUNICATION - HEALTHEAST (OUTPATIENT)
Dept: CARDIOLOGY | Facility: CLINIC | Age: 54
End: 2019-02-25

## 2019-02-26 ENCOUNTER — COMMUNICATION - HEALTHEAST (OUTPATIENT)
Dept: CARDIOLOGY | Facility: CLINIC | Age: 54
End: 2019-02-26

## 2019-03-01 ENCOUNTER — AMBULATORY - HEALTHEAST (OUTPATIENT)
Dept: CARDIOLOGY | Facility: CLINIC | Age: 54
End: 2019-03-01

## 2019-03-01 DIAGNOSIS — I48.91 ATRIAL FIBRILLATION (H): ICD-10-CM

## 2019-03-22 ENCOUNTER — OFFICE VISIT - HEALTHEAST (OUTPATIENT)
Dept: CARDIOLOGY | Facility: CLINIC | Age: 54
End: 2019-03-22

## 2019-03-22 DIAGNOSIS — E78.5 DYSLIPIDEMIA: ICD-10-CM

## 2019-03-22 DIAGNOSIS — I25.10 CORONARY ARTERY DISEASE INVOLVING NATIVE CORONARY ARTERY OF NATIVE HEART WITHOUT ANGINA PECTORIS: ICD-10-CM

## 2019-03-22 DIAGNOSIS — I10 ESSENTIAL HYPERTENSION: ICD-10-CM

## 2019-03-22 ASSESSMENT — MIFFLIN-ST. JEOR: SCORE: 1296.69

## 2019-04-02 ENCOUNTER — COMMUNICATION - HEALTHEAST (OUTPATIENT)
Dept: CARDIOLOGY | Facility: CLINIC | Age: 54
End: 2019-04-02

## 2019-04-11 ENCOUNTER — COMMUNICATION - HEALTHEAST (OUTPATIENT)
Dept: CARDIOLOGY | Facility: CLINIC | Age: 54
End: 2019-04-11

## 2019-04-12 ENCOUNTER — COMMUNICATION - HEALTHEAST (OUTPATIENT)
Dept: CARDIOLOGY | Facility: CLINIC | Age: 54
End: 2019-04-12

## 2019-05-02 ENCOUNTER — OFFICE VISIT - HEALTHEAST (OUTPATIENT)
Dept: CARDIOLOGY | Facility: CLINIC | Age: 54
End: 2019-05-02

## 2019-05-02 DIAGNOSIS — I25.10 CORONARY ARTERY DISEASE INVOLVING NATIVE CORONARY ARTERY OF NATIVE HEART WITHOUT ANGINA PECTORIS: ICD-10-CM

## 2019-05-02 DIAGNOSIS — I10 HTN (HYPERTENSION): ICD-10-CM

## 2019-05-02 DIAGNOSIS — E78.5 DYSLIPIDEMIA: ICD-10-CM

## 2019-05-02 ASSESSMENT — MIFFLIN-ST. JEOR: SCORE: 1269.47

## 2019-05-09 ENCOUNTER — COMMUNICATION - HEALTHEAST (OUTPATIENT)
Dept: ADMINISTRATIVE | Facility: CLINIC | Age: 54
End: 2019-05-09

## 2019-06-17 ENCOUNTER — COMMUNICATION - HEALTHEAST (OUTPATIENT)
Dept: CARDIOLOGY | Facility: CLINIC | Age: 54
End: 2019-06-17

## 2019-06-20 ENCOUNTER — COMMUNICATION - HEALTHEAST (OUTPATIENT)
Dept: FAMILY MEDICINE | Facility: CLINIC | Age: 54
End: 2019-06-20

## 2019-06-20 DIAGNOSIS — I50.43 ACUTE ON CHRONIC COMBINED SYSTOLIC AND DIASTOLIC HEART FAILURE (H): ICD-10-CM

## 2019-06-21 ENCOUNTER — COMMUNICATION - HEALTHEAST (OUTPATIENT)
Dept: FAMILY MEDICINE | Facility: CLINIC | Age: 54
End: 2019-06-21

## 2019-07-29 ENCOUNTER — COMMUNICATION - HEALTHEAST (OUTPATIENT)
Dept: FAMILY MEDICINE | Facility: CLINIC | Age: 54
End: 2019-07-29

## 2019-08-07 ENCOUNTER — COMMUNICATION - HEALTHEAST (OUTPATIENT)
Dept: FAMILY MEDICINE | Facility: CLINIC | Age: 54
End: 2019-08-07

## 2019-08-07 DIAGNOSIS — E11.8 TYPE 2 DIABETES MELLITUS WITH COMPLICATION, WITHOUT LONG-TERM CURRENT USE OF INSULIN (H): ICD-10-CM

## 2019-08-08 ENCOUNTER — COMMUNICATION - HEALTHEAST (OUTPATIENT)
Dept: FAMILY MEDICINE | Facility: CLINIC | Age: 54
End: 2019-08-08

## 2019-08-14 ENCOUNTER — COMMUNICATION - HEALTHEAST (OUTPATIENT)
Dept: FAMILY MEDICINE | Facility: CLINIC | Age: 54
End: 2019-08-14

## 2019-08-14 DIAGNOSIS — E11.8 TYPE 2 DIABETES MELLITUS WITH COMPLICATION, WITHOUT LONG-TERM CURRENT USE OF INSULIN (H): ICD-10-CM

## 2019-08-16 ENCOUNTER — COMMUNICATION - HEALTHEAST (OUTPATIENT)
Dept: FAMILY MEDICINE | Facility: CLINIC | Age: 54
End: 2019-08-16

## 2019-08-21 ENCOUNTER — COMMUNICATION - HEALTHEAST (OUTPATIENT)
Dept: FAMILY MEDICINE | Facility: CLINIC | Age: 54
End: 2019-08-21

## 2019-08-26 ENCOUNTER — COMMUNICATION - HEALTHEAST (OUTPATIENT)
Dept: FAMILY MEDICINE | Facility: CLINIC | Age: 54
End: 2019-08-26

## 2019-08-27 ENCOUNTER — COMMUNICATION - HEALTHEAST (OUTPATIENT)
Dept: FAMILY MEDICINE | Facility: CLINIC | Age: 54
End: 2019-08-27

## 2019-08-29 ENCOUNTER — COMMUNICATION - HEALTHEAST (OUTPATIENT)
Dept: FAMILY MEDICINE | Facility: CLINIC | Age: 54
End: 2019-08-29

## 2019-08-29 DIAGNOSIS — D64.9 ANEMIA, UNSPECIFIED TYPE: ICD-10-CM

## 2019-09-04 ENCOUNTER — COMMUNICATION - HEALTHEAST (OUTPATIENT)
Dept: FAMILY MEDICINE | Facility: CLINIC | Age: 54
End: 2019-09-04

## 2019-09-04 DIAGNOSIS — D64.9 ANEMIA, UNSPECIFIED TYPE: ICD-10-CM

## 2019-09-05 ENCOUNTER — AMBULATORY - HEALTHEAST (OUTPATIENT)
Dept: LAB | Facility: CLINIC | Age: 54
End: 2019-09-05

## 2019-09-05 DIAGNOSIS — D64.9 ANEMIA, UNSPECIFIED TYPE: ICD-10-CM

## 2019-09-05 DIAGNOSIS — E55.9 VITAMIN D INSUFFICIENCY: ICD-10-CM

## 2019-09-05 LAB
ALBUMIN SERPL-MCNC: 3.8 G/DL (ref 3.5–5)
ALP SERPL-CCNC: 63 U/L (ref 45–120)
ALT SERPL W P-5'-P-CCNC: 20 U/L (ref 0–45)
ANION GAP SERPL CALCULATED.3IONS-SCNC: 11 MMOL/L (ref 5–18)
AST SERPL W P-5'-P-CCNC: 16 U/L (ref 0–40)
BASOPHILS # BLD AUTO: 0 THOU/UL (ref 0–0.2)
BASOPHILS NFR BLD AUTO: 0 % (ref 0–2)
BILIRUB SERPL-MCNC: 0.5 MG/DL (ref 0–1)
BUN SERPL-MCNC: 33 MG/DL (ref 8–22)
CALCIUM SERPL-MCNC: 9 MG/DL (ref 8.5–10.5)
CHLORIDE BLD-SCNC: 106 MMOL/L (ref 98–107)
CO2 SERPL-SCNC: 21 MMOL/L (ref 22–31)
CREAT SERPL-MCNC: 0.96 MG/DL (ref 0.6–1.1)
EOSINOPHIL # BLD AUTO: 0.4 THOU/UL (ref 0–0.4)
EOSINOPHIL NFR BLD AUTO: 7 % (ref 0–6)
ERYTHROCYTE [DISTWIDTH] IN BLOOD BY AUTOMATED COUNT: 12.3 % (ref 11–14.5)
GFR SERPL CREATININE-BSD FRML MDRD: >60 ML/MIN/1.73M2
GLUCOSE BLD-MCNC: 133 MG/DL (ref 70–125)
HCT VFR BLD AUTO: 37.4 % (ref 35–47)
HGB BLD-MCNC: 12.8 G/DL (ref 12–16)
LYMPHOCYTES # BLD AUTO: 2 THOU/UL (ref 0.8–4.4)
LYMPHOCYTES NFR BLD AUTO: 36 % (ref 20–40)
MCH RBC QN AUTO: 32.8 PG (ref 27–34)
MCHC RBC AUTO-ENTMCNC: 34.3 G/DL (ref 32–36)
MCV RBC AUTO: 96 FL (ref 80–100)
MONOCYTES # BLD AUTO: 0.3 THOU/UL (ref 0–0.9)
MONOCYTES NFR BLD AUTO: 6 % (ref 2–10)
NEUTROPHILS # BLD AUTO: 2.8 THOU/UL (ref 2–7.7)
NEUTROPHILS NFR BLD AUTO: 51 % (ref 50–70)
PLATELET # BLD AUTO: 221 THOU/UL (ref 140–440)
PMV BLD AUTO: 7.7 FL (ref 7–10)
POTASSIUM BLD-SCNC: 4.4 MMOL/L (ref 3.5–5)
PROT SERPL-MCNC: 6.4 G/DL (ref 6–8)
RBC # BLD AUTO: 3.91 MILL/UL (ref 3.8–5.4)
SODIUM SERPL-SCNC: 138 MMOL/L (ref 136–145)
VIT B12 SERPL-MCNC: 504 PG/ML (ref 213–816)
WBC: 5.5 THOU/UL (ref 4–11)

## 2019-09-06 LAB
25(OH)D3 SERPL-MCNC: 24.5 NG/ML (ref 30–80)
25(OH)D3 SERPL-MCNC: 24.5 NG/ML (ref 30–80)

## 2019-09-19 ENCOUNTER — COMMUNICATION - HEALTHEAST (OUTPATIENT)
Dept: FAMILY MEDICINE | Facility: CLINIC | Age: 54
End: 2019-09-19

## 2019-10-28 ENCOUNTER — COMMUNICATION - HEALTHEAST (OUTPATIENT)
Dept: FAMILY MEDICINE | Facility: CLINIC | Age: 54
End: 2019-10-28

## 2019-10-28 ENCOUNTER — OFFICE VISIT - HEALTHEAST (OUTPATIENT)
Dept: FAMILY MEDICINE | Facility: CLINIC | Age: 54
End: 2019-10-28

## 2019-10-28 DIAGNOSIS — J02.0 STREP THROAT: ICD-10-CM

## 2019-11-27 ENCOUNTER — COMMUNICATION - HEALTHEAST (OUTPATIENT)
Dept: FAMILY MEDICINE | Facility: CLINIC | Age: 54
End: 2019-11-27

## 2019-12-02 ENCOUNTER — COMMUNICATION - HEALTHEAST (OUTPATIENT)
Dept: FAMILY MEDICINE | Facility: CLINIC | Age: 54
End: 2019-12-02

## 2019-12-02 DIAGNOSIS — E11.8 TYPE 2 DIABETES MELLITUS WITH COMPLICATION, WITHOUT LONG-TERM CURRENT USE OF INSULIN (H): ICD-10-CM

## 2019-12-05 ENCOUNTER — COMMUNICATION - HEALTHEAST (OUTPATIENT)
Dept: SCHEDULING | Facility: CLINIC | Age: 54
End: 2019-12-05

## 2019-12-05 DIAGNOSIS — E11.8 TYPE 2 DIABETES MELLITUS WITH COMPLICATION, WITHOUT LONG-TERM CURRENT USE OF INSULIN (H): ICD-10-CM

## 2019-12-26 ENCOUNTER — COMMUNICATION - HEALTHEAST (OUTPATIENT)
Dept: FAMILY MEDICINE | Facility: CLINIC | Age: 54
End: 2019-12-26

## 2019-12-31 ENCOUNTER — VIRTUAL VISIT (OUTPATIENT)
Dept: FAMILY MEDICINE | Facility: OTHER | Age: 54
End: 2019-12-31

## 2019-12-31 NOTE — PROGRESS NOTES
"Date: 2019 09:35:25  Clinician: Tevin Zimmerman  Clinician NPI: [id number]  Patient: Christina Sanchez  Patient : 1965  Patient Address: [address] [place], [place] [address]  Patient Phone: [phone]  Visit Protocol: URI  Patient Summary:  Christina is a 54 year old ( : 1965 ) female who initiated a Visit for cold, sinus infection, or influenza. When asked the question \"Please sign me up to receive news, health information and promotions from [employer].\", Christina responded \"Yes\".    Christina states her symptoms started suddenly 3-6 days ago.   Her symptoms consist of a headache, a sore throat, ear pain, enlarged lymph nodes, chills, nasal congestion, malaise, rhinitis, a cough, facial pain or pressure, and myalgia. She is experiencing mild difficulty breathing with activities but can speak normally in full sentences. Christina also feels feverish.   Symptom details     Nasal secretions: The color of her mucus is yellow.    Cough: Christina coughs a few times an hour and her cough is not more bothersome at night. Phlegm comes into her throat when she coughs. She does not believe the phlegm causes the cough. The color of the phlegm is yellow.     Sore throat: Christina reports having moderate throat pain (4-6 on a 10 point pain scale), does not have exudate on her tonsils, and can swallow liquids. The lymph nodes in her neck are enlarged. A rash has not appeared on the skin since the sore throat started.     Temperature: Her current temperature is 101.6 degrees Fahrenheit. Christina has had a temperature over 100 degrees Fahrenheit for 1-2 days.     Facial pain or pressure: The facial pain or pressure feels worse when bending over or leaning forward.     Headache: She states the headache is moderate (4-6 on a 10 point pain scale).      Christina denies having wheezing and teeth pain. She also denies double sickening (worsening symptoms after initial improvement), taking antibiotic medication for the symptoms, " having recent facial or sinus surgery in the past 60 days, and having a sinus infection within the past year.   Precipitating events  Within the past week, Christina has not been exposed to someone with strep throat. She has recently been exposed to someone with influenza. Christina has not been in close contact with any high risk individuals.   Pertinent medical history  Christina does not get yeast infections when she takes antibiotics.   Weight: 1475 lbs   Christina does not smoke or use smokeless tobacco.   Additional information as reported by the patient (free text): It would not let me enter meds. I take losartan potassium and I have been taking aspirin and Tylenol. Also this has turned into a sinus infection also. My nose is swollen,sore up to and around my eyes and very swollen inside. Very stuffy and runny at the same time. My granddaughter has the flu   Weight: 1475 lbs    MEDICATIONS: No current medications, ALLERGIES: NKDA  Clinician Response:  Dear Christina,  Based on the information provided, you have viral sinusitis, also known as a sinus infection. Sinus infections are caused by bacteria or a virus and symptoms are almost always identical. The difference between the 2 types of infections is timing.  Sinus infections start as viral infections and symptoms improve on their own in about 7 days. If symptoms have not improved after 7 days or have even worsened, a bacterial infection may have developed.  Medication information  Because you have a viral infection, antibiotics will not help you get better. Treating a viral infection with antibiotics could actually make you feel worse.  For more information on why I am not prescribing antibiotics, please watch this video: Antibiotics Aren't Always the Answer.  I am prescribing:       Fluticasone 50 mcg/actuation nasal spray. Inhale 2 sprays in each nostril 1 time per day; after 1 week, may adjust to 1 - 2 sprays in each nostril 1 time per day. This medication takes  several days to start working, so keep taking it even if it doesn't help right away. There are no refills with this prescription.      Benzonatate (Tessalon Perles) 100 mg oral capsule. Take 1-2 capsules by mouth 3 times per day as needed for your cough. There are no refills with this prescription.     Self care  The following tips will keep you as comfortable as possible while you recover:     Rest    Drink plenty of water and other liquids    Take a hot shower to loosen congestion    Use throat lozenges    Gargle with warm salt water (1/4 teaspoon of salt per 8 ounce glass of water)    Suck on frozen items such as popsicles or ice cubes    Drink hot tea with lemon and honey    Take a spoonful of honey to reduce your cough     When to seek care  Please be seen in a clinic or urgent care if new symptoms develop, or symptoms become worse.  Call 911 or go to the emergency room if you feel that your throat is closing off, you suddenly develop a rash, you are unable to swallow fluids, you are drooling, or you are having difficulty breathing.   Diagnosis: Viral sinusitis  Diagnosis ICD: J01.90  Prescription: fluticasone 50 mcg/actuation nasal spray,suspension 1 120 spray aerosol with adapter (grams), 30 days supply. Inhale 2 sprays in each nostril 1 time per day; after 1 week, may adjust to 1 - 2 sprays in each nostril 1 time per day.. Refills: 0, Refill as needed: no, Allow substitutions: yes  Prescription: benzonatate (Tessalon Perles) 100 mg oral capsule 30 capsule, 5 days supply. Take 1-2 capsules by mouth 3 times per day as needed. Refills: 0, Refill as needed: no, Allow substitutions: yes  Pharmacy: [facility], [place], [place] - [phone] - [address], [place] [address]  Addendum created: December 31 20:37:11, 2019 created by: Kade Landeros body: Please call and let her know that This is beyond [facility] protocol and she will need to be seen in clinic.

## 2020-01-13 ENCOUNTER — OFFICE VISIT - HEALTHEAST (OUTPATIENT)
Dept: FAMILY MEDICINE | Facility: CLINIC | Age: 55
End: 2020-01-13

## 2020-02-24 ENCOUNTER — HEALTH MAINTENANCE LETTER (OUTPATIENT)
Age: 55
End: 2020-02-24

## 2020-03-18 ENCOUNTER — COMMUNICATION - HEALTHEAST (OUTPATIENT)
Dept: FAMILY MEDICINE | Facility: CLINIC | Age: 55
End: 2020-03-18

## 2020-03-18 DIAGNOSIS — E11.8 TYPE 2 DIABETES MELLITUS WITH COMPLICATION, WITHOUT LONG-TERM CURRENT USE OF INSULIN (H): ICD-10-CM

## 2020-03-19 ENCOUNTER — COMMUNICATION - HEALTHEAST (OUTPATIENT)
Dept: CARDIOLOGY | Facility: CLINIC | Age: 55
End: 2020-03-19

## 2020-03-20 ENCOUNTER — COMMUNICATION - HEALTHEAST (OUTPATIENT)
Dept: CARDIOLOGY | Facility: CLINIC | Age: 55
End: 2020-03-20

## 2020-03-23 ENCOUNTER — COMMUNICATION - HEALTHEAST (OUTPATIENT)
Dept: CARDIOLOGY | Facility: CLINIC | Age: 55
End: 2020-03-23

## 2020-03-23 DIAGNOSIS — I10 HTN (HYPERTENSION): ICD-10-CM

## 2020-04-07 ENCOUNTER — COMMUNICATION - HEALTHEAST (OUTPATIENT)
Dept: CARDIOLOGY | Facility: CLINIC | Age: 55
End: 2020-04-07

## 2020-04-07 ENCOUNTER — AMBULATORY - HEALTHEAST (OUTPATIENT)
Dept: CARDIOLOGY | Facility: CLINIC | Age: 55
End: 2020-04-07

## 2020-04-07 DIAGNOSIS — I10 HTN (HYPERTENSION): ICD-10-CM

## 2020-04-09 ENCOUNTER — OFFICE VISIT - HEALTHEAST (OUTPATIENT)
Dept: CARDIOLOGY | Facility: CLINIC | Age: 55
End: 2020-04-09

## 2020-04-09 DIAGNOSIS — I48.0 PAROXYSMAL ATRIAL FIBRILLATION (H): ICD-10-CM

## 2020-04-09 DIAGNOSIS — I10 ESSENTIAL HYPERTENSION: ICD-10-CM

## 2020-04-09 DIAGNOSIS — I25.10 CORONARY ARTERY DISEASE INVOLVING NATIVE CORONARY ARTERY OF NATIVE HEART WITHOUT ANGINA PECTORIS: ICD-10-CM

## 2020-04-09 DIAGNOSIS — E78.5 DYSLIPIDEMIA: ICD-10-CM

## 2020-05-07 ENCOUNTER — COMMUNICATION - HEALTHEAST (OUTPATIENT)
Dept: FAMILY MEDICINE | Facility: CLINIC | Age: 55
End: 2020-05-07

## 2020-12-13 ENCOUNTER — HEALTH MAINTENANCE LETTER (OUTPATIENT)
Age: 55
End: 2020-12-13

## 2021-01-05 ENCOUNTER — COMMUNICATION - HEALTHEAST (OUTPATIENT)
Dept: FAMILY MEDICINE | Facility: CLINIC | Age: 56
End: 2021-01-05

## 2021-01-05 DIAGNOSIS — E11.8 TYPE 2 DIABETES MELLITUS WITH COMPLICATION, WITHOUT LONG-TERM CURRENT USE OF INSULIN (H): ICD-10-CM

## 2021-01-06 ENCOUNTER — COMMUNICATION - HEALTHEAST (OUTPATIENT)
Dept: FAMILY MEDICINE | Facility: CLINIC | Age: 56
End: 2021-01-06

## 2021-01-06 DIAGNOSIS — E11.8 TYPE 2 DIABETES MELLITUS WITH COMPLICATION, WITHOUT LONG-TERM CURRENT USE OF INSULIN (H): ICD-10-CM

## 2021-03-15 ENCOUNTER — COMMUNICATION - HEALTHEAST (OUTPATIENT)
Dept: FAMILY MEDICINE | Facility: CLINIC | Age: 56
End: 2021-03-15

## 2021-03-29 ENCOUNTER — COMMUNICATION - HEALTHEAST (OUTPATIENT)
Dept: FAMILY MEDICINE | Facility: CLINIC | Age: 56
End: 2021-03-29

## 2021-04-07 ENCOUNTER — COMMUNICATION - HEALTHEAST (OUTPATIENT)
Dept: FAMILY MEDICINE | Facility: CLINIC | Age: 56
End: 2021-04-07

## 2021-04-17 ENCOUNTER — HEALTH MAINTENANCE LETTER (OUTPATIENT)
Age: 56
End: 2021-04-17

## 2021-05-27 ENCOUNTER — RECORDS - HEALTHEAST (OUTPATIENT)
Dept: ADMINISTRATIVE | Facility: CLINIC | Age: 56
End: 2021-05-27

## 2021-05-27 NOTE — TELEPHONE ENCOUNTER
Dr. Johnson,  The following treatment is planned/scheduled in dental clinic:  Extraction('s) under local anesthetic with epinephrine  Restoration under local anesthetic with epinephrine    Is it ok for patient to proceed with dental treatment  No special treatment, no special precautions and no prophylactic antibiotics?    Coumadin has been started for CVA - she is following up with neurology. Dental office will be advised to check with patient's neurologist regarding coumadin.     The form to be signed is at [facility].     Thank you,  Radha

## 2021-05-28 ENCOUNTER — RECORDS - HEALTHEAST (OUTPATIENT)
Dept: ADMINISTRATIVE | Facility: CLINIC | Age: 56
End: 2021-05-28

## 2021-05-29 NOTE — TELEPHONE ENCOUNTER
Does Vitamin K2 (MK7) need to be bought over the counter?    Shelby Gomez, [facility]  1:46 PM  6/21/2019

## 2021-05-29 NOTE — TELEPHONE ENCOUNTER
Deneen is out of the office until Monday    I don't see any notes from him recently; what is she getting the antibiotics to treat?

## 2021-05-30 NOTE — TELEPHONE ENCOUNTER
Patient called this morning stating that she has had sore throat fever and body aches for the past few days.  She also reported that her grandson was recently diagnosed with strep.  Patient is asking that provider send a prescription of antibiotic for her strep throat.  Writer informed patient that she will need to be seen at the clinic to be evaluated for strep and tested for strep.  Patient verbalized understanding and she will come to the walk-in clinic.

## 2021-05-31 NOTE — TELEPHONE ENCOUNTER
Is it okay if I give verbal orders to have them change it to the accu check contour?  Test strips were sent in on 8/7/19 - I may need to call them regarding the test strips.    Patient would like you to call her? Are you able to do that?

## 2021-05-31 NOTE — TELEPHONE ENCOUNTER
Is there a way to start a PA or get her approved for another monitor? Her old one stopped working.

## 2021-05-31 NOTE — TELEPHONE ENCOUNTER
Order pended for another glucose meter for patient. I did include the brand she wanted in the notes

## 2021-06-01 ENCOUNTER — RECORDS - HEALTHEAST (OUTPATIENT)
Dept: ADMINISTRATIVE | Facility: CLINIC | Age: 56
End: 2021-06-01

## 2021-06-02 ENCOUNTER — RECORDS - HEALTHEAST (OUTPATIENT)
Dept: ADMINISTRATIVE | Facility: CLINIC | Age: 56
End: 2021-06-02

## 2021-06-02 VITALS — BODY MASS INDEX: 31.17 KG/M2 | WEIGHT: 170.4 LBS

## 2021-06-02 VITALS — BODY MASS INDEX: 31.35 KG/M2 | WEIGHT: 171.4 LBS

## 2021-06-02 VITALS — BODY MASS INDEX: 31.09 KG/M2 | WEIGHT: 170 LBS

## 2021-06-02 VITALS — BODY MASS INDEX: 30.36 KG/M2 | HEIGHT: 62 IN | WEIGHT: 165 LBS

## 2021-06-02 VITALS — BODY MASS INDEX: 31.47 KG/M2 | WEIGHT: 171 LBS | HEIGHT: 62 IN

## 2021-06-02 VITALS — BODY MASS INDEX: 30.95 KG/M2 | WEIGHT: 169.2 LBS

## 2021-06-02 VITALS — WEIGHT: 175 LBS | BODY MASS INDEX: 32.01 KG/M2

## 2021-06-02 VITALS — WEIGHT: 183 LBS | BODY MASS INDEX: 33.68 KG/M2 | HEIGHT: 62 IN

## 2021-06-02 VITALS — BODY MASS INDEX: 30.55 KG/M2 | HEIGHT: 62 IN | WEIGHT: 166 LBS

## 2021-06-02 VITALS — WEIGHT: 171.4 LBS | BODY MASS INDEX: 31.35 KG/M2

## 2021-06-02 VITALS — HEIGHT: 62 IN | WEIGHT: 172 LBS | BODY MASS INDEX: 31.65 KG/M2

## 2021-06-02 VITALS — HEIGHT: 62 IN | WEIGHT: 165 LBS | BODY MASS INDEX: 30.36 KG/M2

## 2021-06-02 VITALS
WEIGHT: 181.3 LBS | HEIGHT: 62 IN | WEIGHT: 181.3 LBS | BODY MASS INDEX: 33.36 KG/M2 | BODY MASS INDEX: 33.36 KG/M2 | HEIGHT: 62 IN

## 2021-06-02 VITALS — BODY MASS INDEX: 31.46 KG/M2 | WEIGHT: 172 LBS

## 2021-06-02 VITALS — WEIGHT: 175.6 LBS | BODY MASS INDEX: 32.12 KG/M2

## 2021-06-02 VITALS — BODY MASS INDEX: 31.31 KG/M2 | WEIGHT: 171.2 LBS

## 2021-06-02 VITALS — WEIGHT: 174.5 LBS | BODY MASS INDEX: 31.92 KG/M2

## 2021-06-02 VITALS — WEIGHT: 172.2 LBS | BODY MASS INDEX: 31.5 KG/M2

## 2021-06-02 VITALS — WEIGHT: 170 LBS | BODY MASS INDEX: 31.09 KG/M2

## 2021-06-02 VITALS — BODY MASS INDEX: 32.85 KG/M2 | WEIGHT: 179.6 LBS

## 2021-06-02 NOTE — PATIENT INSTRUCTIONS - HE
Based on the information that you have provided, I have placed an order for you to start treatment.  View your full visit summary for details. Click on the link below to access your visit summary.    Your pharmacist will address any questions you may have about taking the medication.

## 2021-06-02 NOTE — PROGRESS NOTES
Assessment/Plan:        Diagnoses and all orders for this visit:    Strep throat  -     amoxicillin-clavulanate (AUGMENTIN) 875-125 mg per tablet; Take 1 tablet by mouth 2 (two) times a day for 10 days.  Dispense: 20 tablet; Refill: 0            Subjective:    Patient ID: Christina Sanchez is a 54 y.o. female.    HPI Recent symptoms of sore throat after recent exposure to her spouse.     The following portions of the patient's history were reviewed and updated as appropriate: allergies, current medications, past family history, past medical history, past social history, past surgical history and problem list.    Review of Systems          Objective:    Physical Exam

## 2021-06-03 VITALS — WEIGHT: 159 LBS | HEIGHT: 62 IN | BODY MASS INDEX: 29.26 KG/M2

## 2021-06-04 VITALS
DIASTOLIC BLOOD PRESSURE: 82 MMHG | WEIGHT: 147.5 LBS | HEART RATE: 72 BPM | SYSTOLIC BLOOD PRESSURE: 153 MMHG | BODY MASS INDEX: 26.98 KG/M2

## 2021-06-04 NOTE — TELEPHONE ENCOUNTER
Medication Question or Clarification  Who is calling: Pharmacy: [facility]  What medication are you calling about?: victoza  What dose do you take?: 0.6 mg  How often are you taking the medication?: ?  Who prescribed the medication?: Vic  What is your question/concern?: sig incomplete- needs frequency and days supply limitations  Pharmacy: [facility]  Okay to leave a detailed message?: Yes  Site CMT - Please call the pharmacy to obtain any additional needed information.

## 2021-06-04 NOTE — TELEPHONE ENCOUNTER
Are you able to advise as covering provider? I spoke with patient - patient rarely uses this medication, just wants to have it just in case.

## 2021-06-06 NOTE — TELEPHONE ENCOUNTER
Patient is requesting lisinopril - not on current medication list. Please advise. Order pended for glucose testing

## 2021-06-14 NOTE — TELEPHONE ENCOUNTER
Spoke with patient. She is getting the wrong test strips from the pharmacy.  Pended order for correct strips to be sent.

## 2021-06-16 NOTE — TELEPHONE ENCOUNTER
Telephone Encounter by Radha Morgan RN at 1/21/2019 12:15 PM     Author: Radha Morgan RN Service: -- Author Type: Registered Nurse    Filed: 1/21/2019 12:25 PM Encounter Date: 1/16/2019 Status: Signed    : Radha Morgan RN (Registered Nurse)       Left detailed message with my direct number for return call to discuss recommendation below.  Patient has history of intolerance to beta blockers.  =====================  Message received 1/21/19 @ 9:46  Carey Johnson MD Fleischhacker, Kelly, RN             Hi Radha,   Please see note below from cardiac rehab.  Patient blood pressure running somewhat high yet.  If patient willing, let us add metoprolol succinate 50 mg daily to her medical regimen.  Thanks.

## 2021-06-16 NOTE — TELEPHONE ENCOUNTER
"Telephone Encounter by Radha Morgan RN at 4/12/2019 10:47 AM     Author: Radha Morgan RN Service: -- Author Type: Registered Nurse    Filed: 4/12/2019 10:50 AM Encounter Date: 4/12/2019 Status: Signed    : Radha Morgan RN (Registered Nurse)       Left detailed message with recommendation below, follow up appointment info already scheduled 5/2/19 and my direct number for return call with further questions or concerns.    =================  Carey Johnson MD  You 50 minutes ago (9:54 AM)      Ellis Garcia,   Please see note from patient below.  I am uncomfortable making changes \"over the phone\".  I think a lot of what she is experiencing is noncardiac in nature.  Would advise follow-up with primary provider to review much of her concerns.  We can also set up follow-up with myself as well.  Thanks.             "

## 2021-06-16 NOTE — TELEPHONE ENCOUNTER
Telephone Encounter by Radha Morgan RN at 2/18/2019 10:12 AM     Author: Radha Morgan RN Service: -- Author Type: Registered Nurse    Filed: 2/18/2019 10:25 AM Encounter Date: 2/13/2019 Status: Signed    : Radha Morgan RN (Registered Nurse)       Called patient with recommendation. She verbalized understanding and will return call with further questions or concerns. Rx for losartan 50 mg sent to pharmacy as requested.    ====================  In response to [employer] message.  Carey Johnson MD   You 22 minutes ago (9:49 AM)      Ellis Garcia,   Please leave a note from patient below.   Let us have patient try the losartan 50 mg as well as continue her current hydrochlorothiazide (as she proposes) and we can see how she does.  Thanks.

## 2021-06-16 NOTE — TELEPHONE ENCOUNTER
Telephone Encounter by Radha Morgan RN at 2/6/2019 10:42 AM     Author: Radha Morgan RN Service: -- Author Type: Registered Nurse    Filed: 2/6/2019 10:47 AM Encounter Date: 1/16/2019 Status: Signed    : Radha Morgan RN (Registered Nurse)       Dr. Johnson,  Patient is scheduled to see you Friday, February 8th.  I have been unable to reach her with recommendations below.  Thank you.    ===========================  ----- Message from Carey Johnson MD sent at 1/24/2019 12:54 PM CST -----  LDL is quite high.  Would advocate if patient willing a trial of Repatha 140 mg twice daily.  If patient is willing, let us see if we facilitate a preauthorization on her behalf.   ============================  Message received 1/21/19 @ 9:46      Carey Johnson MD Fleischhacker, Kelly, RN               Hi Radha,   Please see note below from cardiac rehab.  Patient blood pressure running somewhat high yet.  If patient willing, let us add metoprolol succinate 50 mg daily to her medical regimen.  Thanks.

## 2021-06-16 NOTE — TELEPHONE ENCOUNTER
Telephone Encounter by Radha Morgan RN at 1/4/2019 11:21 AM     Author: Radha Morgan RN Service: -- Author Type: Registered Nurse    Filed: 1/4/2019 11:28 AM Encounter Date: 12/18/2018 Status: Signed    : Radha Morgan RN (Registered Nurse)       Called patient with recommendation below. Patient is agreeable to initiate hydrochlorothiazide 12.5 mg daily, will continue monitoring her blood pressures and will call with any questions or concerns.    Rx for hydrochlorothiazide sent to [facility]'s as requested in 30 day supply.  ==============================  Carey Johnson MD   You 3 hours ago (8:10 AM)      I would be fine with patient starting hydrochlorothiazide.  Would recommend 12.5 mg daily

## 2021-06-16 NOTE — TELEPHONE ENCOUNTER
Telephone Encounter by Radha Morgan RN at 3/23/2020 11:13 AM     Author: Radha Morgan RN Service: -- Author Type: Registered Nurse    Filed: 3/23/2020 11:55 AM Encounter Date: 3/23/2020 Status: Signed    : Radha Morgan RN (Registered Nurse)       Rx sent for lisinopril 5 mg daily to preferred pharmacy and patient notified. Instructed patient to continue monitoring and call with any further questions or concerns. Patient verbalized understanding and will f/u with tele visit 3/25 as already planned.    Carey Johnson MD  You 16 minutes ago (10:56 AM)      Ellis Garcia,  Please see note from patient below.  I am fine with her trying a low-dose of lisinopril (5 mg daily).  She has apparently had a cough on ACE inhibitors in the past, however, if she is willing to try to therapy I am fine with that.  Thanks.

## 2021-06-16 NOTE — TELEPHONE ENCOUNTER
Received form from [employer] (CMN form). Patient has not been seen since 2018. She is overdue for diabetes follow up. She will need an appointment to have this form filled.     Patient informed that visit is needed to have this completed. Patient declined appointment at this time. I also informed her that if we don't get this form completed, it may result in her not getting her diabetic supplies. Patient understood and had no further questions.

## 2021-06-18 NOTE — LETTER
Letter by Deneen Escobedo FNP at      Author: Deneen Escobedo FNP Service: -- Author Type: --    Filed:  Encounter Date: 12/21/2018 Status: (Other)       01/04/19     Christina Sanchez  [address] [facility] [address]          Dear Christina,       Your primary care provider, Deneen Escobedo DNP, has recommended you schedule a Medication Therapy Management (MTM) appointment. MTM is designed to help you get the most of out of your medications.     During an MTM appointment, you will meet with a specially trained pharmacist to review all of your medications, both prescription and over-the-counter. They will make sure your medications are the best choice for you, safe, and working well. The MTM pharmacist works together with you and your doctor to help you understand your medications, solve any problems related to your medications, and help you meet your health goals.     To make an appointment, please call the MTM scheduling line at [phone] and toll free [phone].      We look forward to hearing from you!    Sincerely,       Cristy Roy, PharmD, BCACP  Medication Management Pharmacist  [facility]

## 2021-06-18 NOTE — LETTER
Letter by Radha Morgan RN at      Author: Radha Morgan RN Service: -- Author Type: --    Filed:  Encounter Date: 1/30/2019 Status: (Other)       Christina Sanchez  [address] [facility] [facility] [address]             January 30, 2019         Dear Ms. Sanchez,    Below are the results from your recent visit:    Resulted Orders   Lipid Profile   Result Value Ref Range    Triglycerides 169 (H) <=149 mg/dL    Cholesterol 255 (H) <=199 mg/dL    LDL Calculated 172 (H) <=129 mg/dL    HDL Cholesterol 49 (L) >=50 mg/dL    Patient Fasting > 8hrs? Yes    Hepatic Profile   Result Value Ref Range    Bilirubin, Total 0.5 0.0 - 1.0 mg/dL    Bilirubin, Direct 0.1 <=0.5 mg/dL    Protein, Total 6.5 6.0 - 8.0 g/dL    Albumin 3.8 3.5 - 5.0 g/dL    Alkaline Phosphatase 90 45 - 120 U/L    AST 19 0 - 40 U/L    ALT 22 0 - 45 U/L     Per Dr. Johnson:   LDL is quite high.  Would advocate if willing a trial of Repatha 140 mg twice daily.  If you are  willing, we will facilitate a preauthorization on your behalf.    Please call with questions or contact us using [employer].    Sincerely,        Electronically signed by Radha Morgan RN

## 2021-06-18 NOTE — LETTER
Letter by Deneen Escobedo FNP at      Author: Deneen Escobedo FNP Service: -- Author Type: --    Filed:  Encounter Date: 2/4/2019 Status: (Other)       Christina Sanchez  [address] [facility] [address]             February 4, 2019         Dear Ms. Sanchez,    Below are the results from your recent visit:    Resulted Orders   Mammo Diagnostic Right    Narrative    MAMMO DIAGNOSTIC 2D RIGHT   2/1/2019 9:39 AM    INDICATION: Microcalcifications at baseline screening mammogram.  COMPARISON: Baseline screening mammogram 01/17/2019.    MAMMOGRAPHIC FINDINGS: Right full-field digital diagnostic mammograms performed. The breasts are heterogeneously dense, which may obscure small masses. Grouped coarse heterogeneous microcalcifications upper outer quadrant right breast 10:00 zone 3 middle   depth situated within a volume of tissue approximately 6 x 4 x 4 mm are indeterminate. Stereotactic guided vacuum assisted right breast biopsy recommended. The findings, rationale for biopsy and procedure description discussed with the patient. 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 She will consult with her neurologist to determine if it is safe to temporarily stop her Coumadin, or if the procedure should be performed while the patient is   anticoagulated. Our staff will help make arrangements for the right breast biopsy. Images evaluated with the assistance of CAD.      Impression    ACR BI-RADS Category 4: Suspicious.            Magnification views are recommended evaluate microcalcifications in the upper outer right breast.    Please call with questions or contact us using [employer].    Sincerely,        Electronically signed by CIRA Crabtree

## 2021-06-18 NOTE — LETTER
Letter by Deneen Escobedo FNP at      Author: Deneen Escobedo FNP Service: -- Author Type: --    Filed:  Encounter Date: 2/4/2019 Status: (Other)       Christina Sanchez  [address] [facility] [address]             February 4, 2019         Dear Ms. Sanchez,    Below are the results from your recent visit:    Resulted Orders   Mammo Diagnostic Right    Narrative    MAMMO DIAGNOSTIC 2D RIGHT   2/1/2019 9:39 AM    INDICATION: Microcalcifications at baseline screening mammogram.  COMPARISON: Baseline screening mammogram 01/17/2019.    MAMMOGRAPHIC FINDINGS: Right full-field digital diagnostic mammograms performed. The breasts are heterogeneously dense, which may obscure small masses. Grouped coarse heterogeneous microcalcifications upper outer quadrant right breast 10:00 zone 3 middle   depth situated within a volume of tissue approximately 6 x 4 x 4 mm are indeterminate. Stereotactic guided vacuum assisted right breast biopsy recommended. The findings, rationale for biopsy and procedure description discussed with the patient. 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 She will consult with her neurologist to determine if it is safe to temporarily stop her Coumadin, or if the procedure should be performed while the patient is   anticoagulated. Our staff will help make arrangements for the right breast biopsy. Images evaluated with the assistance of CAD.      Impression    ACR BI-RADS Category 4: Suspicious.            Magnification views are recommended evaluate microcalcifications in the upper outer right breast.     Please call with questions or contact us using [employer].    Sincerely,        Electronically signed by CIRA Crabtree

## 2021-06-19 NOTE — LETTER
Letter by Carey Johnson MD at      Author: Carey Johnson MD Service: -- Author Type: --    Filed:  Encounter Date: 5/9/2019 Status: (Other)         Christina Sanchez  [address] [facility] [address]      May 9, 2019      Dear Christina,    This letter is to remind you that you will be due for your follow up appointment with Dr. Garrick Johnson  . To help ensure you are in the best health possible, a regular follow-up with your cardiologist is essential.     Please call our Patient Scheduling Line at [phone] to schedule your appointment at your earliest convenience.  If you have recently scheduled an appointment, please disregard this letter.    We look forward to seeing you again. As always, we are available at the number  above for any questions or concerns you may have.      Sincerely,     The Physicians and Staff of [facility]

## 2021-06-19 NOTE — LETTER
Letter by Deneen Escobedo FNP at      Author: Deneen Escobedo FNP Service: -- Author Type: --    Filed:  Encounter Date: 8/8/2019 Status: (Other)         Christina Sanchez  [address] [facility] [address]             August 8, 2019         Dear [facility]aiser Sanchez,    Our records show that you are due for a colonoscopy.  We do want to inform you that there are a couple of other options besides the traditional colonoscopy that we offer.  If you would like to do the traditional colonoscopy, please contact a [facility] near you according to the card enclosed.  If you would like to do one of the other options available to you, please let you primary doctor know and they will get that ordered.  Enclosed is some information on the other options for you to read over.  If you have had any of these done at another facility, please arrange for us to receive those records so we can update your chart.    Please call with questions or contact us using [employer].    Sincerely,        Electronically signed by CIRA Crabtree

## 2021-06-19 NOTE — LETTER
Letter by Deneen Escobedo FNP at      Author: Deneen Escobedo FNP Service: -- Author Type: --    Filed:  Encounter Date: 8/7/2019 Status: (Other)         Christina Sanchez  [address] [facility] [address]             August 7, 2019         Dear Ms. Sanchez,    I just wanted to send out a friendly reminder that you are due for a blood pressure follow up.  Please use my chart or call the clinic at [phone] and schedule an appointment with your provider.    Please call with questions or contact us using [employer].    Sincerely,        Electronically signed by CIRA Crabtree

## 2021-06-19 NOTE — LETTER
Letter by Deneen Escobedo FNP at      Author: Deneen Escobedo FNP Service: -- Author Type: --    Filed:  Encounter Date: 9/19/2019 Status: (Other)         Christina Sanchez  [address] [facility] [address]             September 19, 2019         Dear Ms. Sanchez,    Our records indicate that you are due for a cervical cancer screen/pap screen.  Please call the clinic or use my chart and schedule an office visit to complete this.  If you have had this done somewhere other than [facility], please help us obtain a copy of your testing/results so we can update your records.  You can also just let us know when and where you had the testing completed through a phone call or through my chart.The records can be faxed to us at [phone].    Please call with questions or contact us using [employer].    Sincerely,        Electronically signed by CIRA Crabtree

## 2021-06-20 NOTE — ANESTHESIA PREPROCEDURE EVALUATION
Anesthesia Evaluation      Patient summary reviewed   No history of anesthetic complications     Airway   Mallampati: II  Neck ROM: full   Pulmonary - negative ROS and normal exam                          Cardiovascular - normal exam  Exercise tolerance: > or = 4 METS  (+) hypertension poorly controlled, past MI, CAD, angina ((+) symptoms on NTG infusion.) with exertion and at rest, CHF, cardiomyopathy,     ECG reviewed        Neuro/Psych - negative ROS     Endo/Other    (+) diabetes mellitus type 2 using insulin, obesity,      GI/Hepatic/Renal - negative ROS      Other findings: CHF (congestive heart failure) (H)     Acute on chronic heart failure (H)    Acute respiratory failure with hypoxia (H)    Hypertensive emergency    Viral gastroenteritis    Type 2 diabetes mellitus with complication, without long-term current use of insulin (H)    Noncompliance with medication regimen    Non-ST elevation myocardial infarction (NSTEMI) (H)    Troponin level elevated    Acute systolic congestive heart failure (H)    NSTEMI (non-ST elevated myocardial infarction) (H)    Bacteremia    Acute cystitis without hematuria    Anemia, unspecified type          Dental    (+) chipped                       Anesthesia Plan  Planned anesthetic: general endotracheal  TATO  Consider ketamine and methadone IV on induction.  ASA 4   Induction: intravenous   Anesthetic plan and risks discussed with: patient  Anesthesia plan special considerations: antiemetics, CVP line, arterial catheterization, pulmonary artery catheterization, IV therapy two IVs, dexmedetomidine  Post-op plan: extended intubation/vent support

## 2021-06-20 NOTE — ANESTHESIA PROCEDURE NOTES
Arterial Line  Reason for Procedure: hemodynamic monitoring  Patient location during procedure: OR pre-induction  Start time: 10/8/2018 7:08 AM  End time: 10/8/2018 7:16 AM  Staffing:  Performing  Anesthesiologist: CHARLOTTE CHEEK  Sterile Precautions:  sterile barriers used during insertion: cap, mask, sterile gloves, large sheet, and hand hygiene used.  Arterial Line:     Laterality: left  Location: brachial  Prepped with: ChloroPrep    Needle gauge: 20 G  Number of Attempts: 1  Secured with: tape, transparent dressing and pressure dressing  Flushed with: saline    Ultrasound evaluation of access site: yes  Vessel patent by US exam    Concurrent real time visualization of needle entry  Permanent ultrasound image captured

## 2021-06-20 NOTE — ANESTHESIA POSTPROCEDURE EVALUATION
Patient: Christina Sanchez  CORONARY ARTERY BYPASS GRAFT X 3 ENDOSCOPIC VEIN HARVEST, INTERNAL MAMMARY ARTERY, ANESTHESIA TRANSESOPHAGEAL ECHOCARDIOGRAM  Anesthesia type: general    Patient location: ICU  Last vitals:   Vitals:    10/08/18 1715   BP:    Pulse: 90   Resp:    Temp:    SpO2: 91%     Post vital signs: stable  Level of consciousness: awake  Post-anesthesia pain: pain controlled  Post-anesthesia nausea and vomiting: no  Pulmonary: BIPAP  Cardiovascular: stable and on epi and nicardipine  Hydration: adequate  Anesthetic events: no    QCDR Measures:  ASA# 11 - Janet-op Cardiac Arrest: ASA11B - Patient did NOT experience unanticipated cardiac arrest  ASA# 12 - Janet-op Mortality Rate: ASA12B - Patient did NOT die  ASA# 13 - PACU Re-Intubation Rate: ASA13X - Exclusion: organ donor or direct ICU transfer  ASA# 10 - Composite Anes Safety: ASA10A - No serious adverse event    Additional Notes:

## 2021-06-20 NOTE — ANESTHESIA CARE TRANSFER NOTE
Monitored transport to ICU with anesthesiologist/surgeon. O2 via ETT/Ambu 10 lpm.     Last vitals:   Vitals:    10/08/18 1231   BP: 132/75   Pulse: 90   Resp: 16   Temp: 98.7   SpO2: 98%     Patient's level of consciousness is sedated  Spontaneous respirations: no: ventilator  Maintains airway independently: no: ETT  Dentition unchanged: yes  Oropharynx: endotracheal tube in place    QCDR Measures:  ASA# 20 - Surgical Safety Checklist: WHO surgical safety checklist completed prior to induction  PQRS# 430 - Adult PONV Prevention: NA - Not adult patient, not GA or 3 or more risk factors NOT present  ASA# 8 - Peds PONV Prevention: NA - Not pediatric patient, not GA or 2 or more risk factors NOT present  PQRS# 424 - Janet-op Temp Management: 4559F - At least one body temp DOCUMENTED => 35.5C or 95.9F within required timeframe  PQRS# 426 - PACU Transfer Protocol:NA - Patient did not go to PACU  ASA# 14 - Acute Post-op Pain: NA - Patient under age 10y or did not go to PACU

## 2021-06-20 NOTE — ANESTHESIA PROCEDURE NOTES
Central line    Start time: 10/8/2018 7:30 AM  End time: 10/8/2018 7:39 AM  Patient location: OR Post-induction  Indications: central pressure monitoring and vascular access  Performing Anesthesiologist: CHARLOTTE CHEEK  Pre-procedure Checklist  Completed: patient identified, site marked, risks, benefits, and alternatives discussed, timeout performed, consent obtained, hand hygiene performed, all elements of maximal sterile barriers used including cap, mask, gown, sterile gloves, and large sheet and skin prep agent completely dried prior to procedure    Procedure Details:  Preparation: 2% chlorhexidine  Location details: right internal jugular  Site selection rationale: access  Catheter type: Introducer with Norton-Ivett  Introducer type: MAC  Lumens:double lumen  Withdrawn and locked at: 49Number of attempts: 1  Ultrasound evaluation of access site: yes  Vessel patent by US exam  Concurrent real time visualization of needle entry  manometry confirmation of venous access    Post-procedure:   line sutured and Antimicrobial disks with CHG applied  Assessment: blood return through all ports and free fluid flow  Complications: none

## 2021-06-21 NOTE — PROGRESS NOTES
ITP ASSESSMENT   Assessment Day: Initial  Session Number: 1  Precautions: Surgical, Balance due to bilateral drop foot  Diagnosis: MI;CAB  Risk Stratification: High  Referring Provider: Chelsea Redding PA-C  EXERCISE  Exercise Assessment: Initial     6 Minute Walk Test deferred due to bilateral drop foot    Nustep- exercise for 10 min.  Pre   Pre Exercise HR: 68                  Pre Exercise BP: 134/78    Peak  Peak HR: 75                 Peak BP: 152/84  Peak O2 SAT: 97  Peak RPE: 14  MET level:2.2-2.3    Symptoms:  Peak Symptoms: leg fatigue    5 mins. Post  5 Min Post HR: 64  5 Min Post BP: 130/66                         Exercise Plan  Goals Next 30 days  ADL'S: Tolerate home activities with less fatigue  Leisure: Tolerate walking program 3-4x/week, 10-15 min walks  Work: Disabled    Education Goals: Has system for taking medication.;Patient can state cardiac s/s and appropriate emergency response.  Education Goals Met: Medication review.    Exercise Prescription  Exercise Mode: Bike;Nustep;Hallway Walking  Frequency: 2x/week  Duration: 30-40 min  Intensity / THR: 20-30 beats above resting heart rate  RPE 11-14  Progression / Met level: 2.3-3.3  Resistive Training?: No    Current Exercise (mins/week): 1    Interventions  Home Exercise:  Mode: walkiing  Frequency: 3-4x/week  Duration: 10-15 min    Education Material : Provide written material;Individual education and counseling    Education Completed  Exercise Education Completed: Signs and Symptoms;Medication review;RPE;Emergency Plan;Home Exercise;Warm up/cool down;FITT Principles            Exercise Follow-up/Discharge  Follow up/Discharge: Pt encouraged to begin home walking program. NUTRITION  Nutrition Assessment: Initial    Nutrition Risk Factors:  Nutrition Risk Factors: Diabetes;Dyslipidemia;Overweight  HbA1c: 9.1  Monitors blood sugar at home: Yes  Frequency: 2x/day  Cholesterol: 197  LDL: 114  HDL: 31  Triglycerides: 259    Nutrition  "Plan  Interventions  Other Nutrition Intervention: Therapist/Pt Discussion;Diet Class;Provide with Written Material    Education Completed  Nutrition Education Completed: Risk factor overview;Low Saturated fat diet;Low sodium diet    Goals  Nutrition Goals (Next 30 days): Patient can identify their risk factors for CAD;Patient knows appropriate portion size    Goals Met  Nutrition Goals Met: Patient follows a low sodium diet;Completed Nutritional Risk Screen;Provided Rate your Plate Survey;Patient states following a low saturated fat diet    Height, Weight, and  BMI  Weight: 179 lb 9.6 oz (81.5 kg)  Height: 5' 2\" (1.575 m)  BMI: 32.84    Nutrition Follow-up  Follow-up/Discharge: Pt encouraged to meet with dietician       Other Risk Factors  Other Risk Factor Assessment: Initial    HTN Risk Factor: Hypertension    Pre Exercise BP: 134/78  Post Exercise BP: 130/66    Hypertension Plan  Goals  HTN Goals: Take medication as prescribed;Exercises regularly    Goals Met  HTN Goals Met: Follow low sodium diet    HTN Interventions  HTN Interventions: Diet consult;Therapist/patient discussion;Provide written material    HTN Education Completed  HTN Education Completed: Medication review;Risk factor overview;Low sodium diet    Tobacco Risk Factor: NA    No Data Recorded    Tobacco Plan  Tobacco Goals  No Data Recorded    Goals Met  No Data Recorded    Tobacco Interventions  No Data Recorded    Tobacco Education Completed  No Data Recorded    Risk Factor Follow-up   No Data Recorded   PSYCHOSOCIAL  Psychosocial Assessment: Initial     [facility] Q of L Summary Score: 23    RULA-D Score: 14    Psychosocial Risk Factor: Stress    Psychosocial Plan  Interventions  Interventions: Provide written material;Individual education and counseling    Education Completed  Education Completed: Relaxation/Coping Techniques    Goals  Goals (Next 30 days): Identify stressors;Practicing stress management skills    Goals Met  Goals Met: " Identified Support system    Psychosocial Follow-up  No Data Recorded           Patient involved in Goal setting?: Yes    Signature: _____________________________________________________________    Date: __________________    Time: __________________

## 2021-06-21 NOTE — PROGRESS NOTES
Christina Sanchez  1965  [id number]    Reason for visit: Christina Sanchez is 53 y.o. year old female seen in clinic for routine follow-up after cardiac surgery. Hospital course was notable for concern for a large incarcerated umbilical hernia and general surgery was consulted. Hernia was not felt to be causing any ischemic bowel or inflammatory changes and could be followed up as an outpatient. 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 Her neurological symptoms did improve over her hospitalization. She was diuresed and wean off of O2 in the usual manner. Pt was uncomfortable with insulin therapy and how it made her feel, she refused insulin ad was discharged on Januvia . Patient was discharged to acute rehab for further medical and rehab management..    Procedure CABG  DOS 10/8/2018  Day of discharge: 10/18  Discharge to Acute rehab    Readmissions: none    Followed up with Joel D Wegener, MD   Follow up with Cardiology scheduled for - 12/6  Cardiac Rehab start date : to be determined     Assessment  Exam  There were no vitals taken for this visit.  Gen: Alert, oriented, pleasant, no acute distress  CV: RRR without murmur or rub, no appreciable edema  Lungs: CTAB, non-labored breathing on room air  GI: Abdomen soft, non-tender, non-distended  Sternum is stable no movement  Incisions: Chest and leg incisions well healing; C/D/I without erythema, purulence, swelling    Appetite: fair  Bowels: regular  Weight: stable     Plan    Christina Sanchez is a 53 y.o. year old female status post CABG with post op CVA who returns to clinic for post-op visit. She is wearing LE braces and using a rolling walker. She was discharged from acute rehab and is now home. She is complaining of neck, shoulder and hand discomfort. She does not want any narcotic medications. We discussed non drug treatments such as heat,  "ice and massage.     She could also try a OTC lidocaine patch. I suggested she retry Melatonin with tylenol at  for better sleep.     She has a follow up scheduled with Neurology to address some of these complaints and stroke follow up. She will ask about length of anticoagulation with coumadin needed. When coumadin no longer needed for s/p CVA, she would benefit from Plavix for graft protection and that could be started at that time and continued for one year post CABG. I put in an order for amb consult for HE coumadin management in the mean time.   INR 3.9 with instructions to not take coumadin today. She was instructed to recheck INR tomorrow.  We reviewed together her questions and post op course. She is tired and sore, as described above, however she is looking much better and moves stronger than when seen at discharge. She is improving and I tried to encourage her that she was heading in the right direction.     Reviewed with patient:  Vital signs /86 (Patient Site: Right Arm, Patient Position: Sitting, Cuff Size: Adult Regular)  Pulse 84  Resp 16  Ht 5' 2\" (1.575 m)  Wt 183 lb (83 kg)  BMI 33.47 kg/m2    Medications- no change - on Coumadin    1. Surgically doing well. Incisions are healing well with no signs of infection. Sternum is stable.  2. Vital signs are stable. .  3. Follow-up with cardiology as scheduled .  4. Continue cardiac rehab until completed.  5. Continue sternal precautions Jan. 8, 2018  6. May start driving- doesn't drive   7. Return to work - no plan to return at this point.   8. No need for further follow-up with CV surgery unless concerns.    Follow up as scheduled with    Ambulatory coumadin clinic order placed. Pt to call clinic to set up appointment.  INR tomorrow - 10/31  Neurology -Dec 19  Cardiology  Endocrine   Cardiac rehab at [facility].         Feel free to call our office with questions. [phone]          "

## 2021-06-21 NOTE — PROGRESS NOTES
INR 1.5 increase dose to 6 mg M and F and 4 mg all other days. Retest in 1 week. After talking with pt and discussing history of greens/salads and medication change. Pt will  continue  with current diet and dosing of Warfarin.  Continue with moderation of Vit K and green leafy vegetables. Cautioned to call with increase bruising or bleeding. Reminded to call with medication change especially antibiotic. Call with any questions or concerns or any up coming procedures. Cautioned about using Herbal medication.

## 2021-06-21 NOTE — PROGRESS NOTES
INR 1.9 Continue current management dosing of Warfarin. Continue  diet of moderate Vitamin K intake. Discussed with pt the need to call with questions or concerns or any change in medication especially herbal medication or OTC. Call with increased bleeding or bruising or any upcoming procedures.  Retest in 2 weeks.

## 2021-06-21 NOTE — PROGRESS NOTES
Cardiac Rehab  Phase II Assessment    Assessment Date: 11/6/2018    Diagnosis: NSTEMI, CHF  Date of Onset: 10/3/2018  Procedure: CABGX3  Date of Onset: 10/8/2018  ICD/Pacemaker: No Parameters: na  Post-op Complications:Embolic CVA, respiratory issues  ECG History: SR, ST abnormalities, EF%:63  Past Medical History: Hypertension, Polyneuropathy, Bilateral drop foot, hx non-compliance with medication    Physical Assessment  Precautions/ Physical Limitations: surgical precautions, balance issues due to bilateral drop foot  Oxygen: No  O2 Sats: 98 Lung Sounds: did not assess Edema: none  Incisions: healing well  Sleeping Pattern: poor  Appetite: fair   Nutrition Risk Screen: Pt would benefit from meeting with dietician and diabetic educator    Pain  Location: shoulder, neck arm, hand  Characteristics:pain  Intensity: (0-10 scale) 0  Current Pain Management: tylenol    Response: pt denied pain at session today    Psychosocial/ Emotional Health  1. In the past 12 months, have you been in a relationship where you have been abused physically, emotionally, sexually or financially? No  notified: NA  2. Who do you turn to for emotional support?: significant other, daughter  3. Do you have cultural or spiritual needs? No  4. Have there been any major life changes in the past 12 months? Yes. Father passed away in June, health issues, not being able to work    Referral Information  Primary Physician: Joel D Wegener, MD  Cardiologist: Dr. Johnson  Surgeon: Dr. Whitney    Home exercise/Equipment: none    Patient's long-term goal(s): Regain stamina, endurance, home exercise program, tolerate all home activities    1. Living Accommodations: Home Steps: Yes, but pt does not use the stairs      Support people at home: significant other, daughter,    2. Marital Status:   3. Family is able to assist with cares      Congregational/Community involvement: na  4. Recreation/Hobbies: music

## 2021-06-21 NOTE — PROGRESS NOTES
Assessment:     1. Atrial fibrillation (H)  POCT INR     Christina is here Pre-Cardioversion? No  Past Medical History:   Diagnosis Date     Diabetes mellitus (H)      Hypertension        Plan:     Written dosage and follow-up instructions were provided in the After Visit Summary    Anticoagulation Episode Summary     Current INR goal 2.0-3.0   Next INR check 11/19/2018   INR from last check 2.1 (11/5/2018)   Weekly max warfarin dose    Target end date    INR check location    Preferred lab    Send INR reminders to  [facility] SUPPORT POOL    Indications   Atrial fibrillation (H) [I48.91] [I48.91]           Comments          Anticoagulation Care Providers     Provider Role Specialty Phone number    Carey Johnson MD  Cardiology [phone]          Discussed the following:  Education provided in regards to  Diet, Bleeding signs and symptoms, Medication changes and Travel safety, Importance of consistent vitamin K intake , High Vitamin K foods discussed in detail , Low Vitamin K foods discussed in detail , Importance of therapeutic range , Potential interaction between warfarin and alcohol discussed in detail , Importance of taking medication as instructed , Allowing no longer than 4-6  weeks between INR monitoring, New patient education packet reviewed in  and My Guide to Warfarin Therapy   Christina displays a  good warfarin knowledge base. There were   no barriers to education  Tegan Ayala        Subjective/Objective:      ANTICOAGULATION MANAGEMENT    Christina Sanchez is  a new warfarin patient    Joel D Wegener, MD

## 2021-06-22 NOTE — PROGRESS NOTES
INR 1.7 increase dose to 6 mg daily. After talking with pt and discussing history of greens/salads and medication change. Pt will  continue  with current diet and dosing of Warfarin.  Continue with moderation of Vit K and green leafy vegetables. Cautioned to call with increase bruising or bleeding. Reminded to call with medication change especially antibiotic. Call with any questions or concerns or any up coming procedures. Cautioned about using Herbal medication.

## 2021-06-22 NOTE — PROGRESS NOTES
1.6 changing Warfarin to [facility] instead of [facility]. Will take 8 mg today and then 6 mg with retest on 1/7. After talking with pt and discussing history of greens/salads and medication change. Pt will  continue  with current diet and dosing of Warfarin.  Continue with moderation of Vit K and green leafy vegetables. Cautioned to call with increase bruising or bleeding. Reminded to call with medication change especially antibiotic. Call with any questions or concerns or any up coming procedures. Cautioned about using Herbal medication.

## 2021-06-22 NOTE — PROGRESS NOTES
ITP ASSESSMENT   Assessment Day: 30 Day    Session Number: 2  Precautions: surgical, balance due to bilateral    Diagnosis: MI;CAB    Risk Stratification: High    Referring Provider: Wegener, Joel D, MD   ITP sent to Dr. Chin  EXERCISE  Exercise Assessment: Reassessment       6 Minute Walk Test   Pre   Pre Exercise HR: 68                    Pre Exercise BP: 134/78      Peak  Peak HR: 75                   Peak BP: 152/84    No Data Recorded  Peak O2 SAT: 97    Peak RPE: 14    No Data Recorded    Symptoms:  Peak Symptoms: leg fatigue      5 mins. Post  5 Min Post HR: 64    5 Min Post BP: 130/66                           Exercise Plan  Goals Next 30 days  ADL'S: Tolerate home activities with less fatigue    Leisure: Continue to marching/leg exercises 3-4x/week    Work: disabled      Education Goals: Has system for taking medication.;Patient can state cardiac s/s and appropriate emergency response.    Education Goals Met: Medication review.;Patient can state cardiac s/s and appropriate emergency response.;Has system for taking medication.                          Goals Met  Initial ADL's goals met: Tolerates doing home exercise with less fatigue, still reports getting fatigue and needing to stop and take breaks, will keep as same goal    Initial Leisure goals met: Tolerates marching program at home    Intial Work goals met: Disabled    Initial Progression: Reached 1.8-2.2 MET level.      Exercise Prescription  Exercise Mode: Bike;Nustep;Hallway Walking    Frequency: 2x/week    Duration: 40-45 min.    Intensity / THR: 20-30 beats above resting heart rate    RPE 11-14  Progression / Met level: 2.5-3    Resistive Training?: No      Current Exercise (mins/week): 100      Interventions  Home Exercise:  Mode: marching, leg exercises    Frequency: 3-4x/week    Duration: 30 min.      Education Material : Provide written material;Individual education and counseling      Education Completed  Exercise Education Completed: Signs  "and Symptoms;Medication review;RPE;Emergency Plan;Home Exercise;Warm up/cool down;FITT Principles              Exercise Follow-up/Discharge  Follow up/Discharge: Continue with home exercise program.           NUTRITION  Nutrition Assessment: Reassessment      Nutrition Risk Factors:  Nutrition Risk Factors: Diabetes;Dyslipidemia;Overweight  HbA1c: 9.1  Monitors blood sugar at home: Yes  Frequency: 2x/day  Cholesterol: 197  LDL: 114  HDL: 31  Triglycerides: 259      Nutrition Plan  Interventions  No Data Recorded  Other Nutrition Intervention: Therapist/Pt Discussion    No Data Recorded  No Data Recorded    Education Completed  Nutrition Education Completed: Risk factor overview;Low Saturated fat diet;Low sodium diet      Goals  Nutrition Goals (Next 30 days): Patient knows appropriate portion size      Goals Met  Nutrition Goals Met: Patient follows a low sodium diet;Completed Nutritional Risk Screen;Provided Rate your Plate Survey;Patient states following a low saturated fat diet      Height, Weight, and  BMI  Weight: 172 lb (78 kg)  Height: 5' 2\" (1.575 m)  BMI: 31.45      Nutrition Follow-up  Follow-up/Discharge: Pt encouraged to meet with dieticianjohn 12/11.  Follows low sodium and low fat diet, no sweets.       Other Risk Factors  Other Risk Factor Assessment: Reassessment      HTN Risk Factor: Hypertension      Pre Exercise BP: 130/76  Post Exercise BP: 114/76      Hypertension Plan  Goals  HTN Goals: Patient demonstrates understanding of HTN, no goals identified for the next 30 days      Goals Met  HTN Goals Met: Follow low sodium diet;Take medication as prescribed;Exercises regularly      HTN Interventions  HTN Interventions: Diet consult;Therapist/patient discussion;Provide written material      HTN Education Completed  HTN Education Completed: Medication review;Risk factor overview;Low sodium diet      Tobacco Risk Factor: NA           PSYCHOSOCIAL  Psychosocial Assessment: Reassessment       [facility]" COOP Q of L Summary Score: 23      RULA-D Score: 14      Psychosocial Risk Factor: Stress      Psychosocial Plan  Interventions  Interventions: Provide written material;Individual education and counseling      Education Completed  Education Completed: Relaxation/Coping Techniques      Goals  Goals (Next 30 days): Improvement in [facility] COOP score      Goals Met  Goals Met: Identified Support system;Practicing stress management skills      Psychosocial Follow-up  Follow-up/Discharge: Patient states that she does deep breathing, listening to music and watching [employer] movies for relaxation       Patient involved in Goal setting?: Yes      Signature: _____________________________________________________________    Date: __________________    Time: __________________

## 2021-06-22 NOTE — TELEPHONE ENCOUNTER
Reached out to patient with recommendation.    Patient reports blood pressures runnin/70 to 140/80 pulse 79-80  160/85 on 2 separate occassions.  Currently taking:  Amlodipine 10 mg daily  Losartan 100 mg daily    Patient had follow up with her PCP 18 and hydrochlorothiazide was mentioned as a possible addition to amlodipine 10 mg and losartan 100 mg daily. Patient states her sister and mother tolerate hctz and she wonders if Dr. Johnson would recommend this addition.    Patient states she feels well and has no symptoms to speak of.    Informed patient a message will be sent to Dr. Johnson and a return call will be made with any further recommendations.    Call transferred to  for 3 month follow up with Dr. Johnson.   =====================    Dr. Johnson,  Please see blood pressures above. Patient has been off clonidine and did not start diltiazem.    Patient is scheduled follow up with you 19.  Any new orders or recommendations in the interim?  Thank you,  Radha

## 2021-06-22 NOTE — PROGRESS NOTES
ITP ASSESSMENT   Assessment Day: 60 Day    Session Number: 10  Precautions: Surgical, balance concerns from B ft drop    Diagnosis: MI;CAB    Risk Stratification: High    Referring Provider: Wegener, Joel D, MD   ITP sent to Dr. Chin  EXERCISE  Exercise Assessment: Reassessment                              Exercise Plan  Goals Next 30 days  ADL'S: Tolerate home activities with less fatigue    Leisure: Continue to marching/leg exercises 3-4x/week    Work: disabled      Education Goals: Has system for taking medication.;Patient can state cardiac s/s and appropriate emergency response.    Education Goals Met: Medication review.;Patient can state cardiac s/s and appropriate emergency response.;Has system for taking medication.                          Goals Met  30 Day Progression: Pt did not attend rehab today.  Unable to assess goals.      Initial ADL's goals met: Tolerates doing home exercise with less fatigue, still reports getting fatigue and needing to stop and take breaks, will keep as same goal    Initial Leisure goals met: Tolerates marching program at home    Intial Work goals met: Disabled    Initial Progression: Reached 1.8-2.2 MET level.      Exercise Prescription  Exercise Mode: Bike;Nustep;Hallway Walking    Frequency: 2x/week    Duration: 40-45 min.    Intensity / THR: 20-30 beats above resting heart rate    RPE 11-14  Progression / Met level: 2.5-3    Resistive Training?: No      Current Exercise (mins/week): 120      Interventions  Home Exercise:  Mode: marching, leg exercises    Frequency: 3-4x/week    Duration: 30 min.      Education Material : Provide written material;Individual education and counseling      Education Completed  Exercise Education Completed: Signs and Symptoms;Medication review;RPE;Emergency Plan;Home Exercise;Warm up/cool down;FITT Principles              Exercise Follow-up/Discharge  Follow up/Discharge: Unable to assess exercise goals as pt did not attend rehab today.  Will  "assess when she returns.   NUTRITION  Nutrition Assessment: Reassessment      Nutrition Risk Factors:  Nutrition Risk Factors: Diabetes;Dyslipidemia;Overweight  Monitors blood sugar at home: Yes  Frequency: 2x/day      Nutrition Plan  Interventions  Diet Consult: Completed    Other Nutrition Intervention: Diet Class;Therapist/Pt Discussion;Educational Videos;Provide with Written Material      Education Completed  Nutrition Education Completed: Low Saturated fat diet;Low sodium diet;Carbohydrate counting      Goals  Nutrition Goals (Next 30 days): Patient will follow a low sodium diet;Patient able to demonstrate carbohydrate counting;Patient will follow a low saturated fat diet;Patient knows appropriate portion size      Goals Met  Nutrition Goals Met: Patient follows a low sodium diet;Patient able to demonstrate carbohydrate counting;Patient states following a low saturated fat diet;Patient knows appropriate portion size      Height, Weight, and  BMI  Weight: 169 lb 3.2 oz (76.7 kg)  Height: 5' 2\" (1.575 m)  BMI: 30.94      Nutrition Follow-up  Follow-up/Discharge: Unable to assess diet goals as pt did not attend rehab today.  Will assess when she returns.         Other Risk Factors  Other Risk Factor Assessment: Reassessment      HTN Risk Factor: Hypertension      Pre Exercise BP: 152/82 (Recheck: 118/60, pt drank water)  Post Exercise BP: (!) 200/100 (192/100, 182/92, 162/82 pt feels better-sent home)      Hypertension Plan  Goals  HTN Goals: Patient demonstrates understanding of HTN, no goals identified for the next 30 days      Goals Met  HTN Goals Met: Follow low sodium diet;Take medication as prescribed;Exercises regularly      HTN Interventions  HTN Interventions: Diet consult;Therapist/patient discussion;Provide written material      HTN Education Completed  HTN Education Completed: Medication review;Risk factor overview;Low sodium diet      Tobacco Risk Factor: NA        Risk Factor Follow-up   " Follow-up/Discharge: Unable to assess HTN goals as pt did not attend rehab today.  Will assess when she retruns.     PSYCHOSOCIAL  Psychosocial Assessment: Reassessment         Psychosocial Risk Factor: Stress      Psychosocial Plan  Interventions  Interventions: Provide written material;Individual education and counseling      Education Completed  Education Completed: Relaxation/Coping Techniques      Goals  Goals (Next 30 days): Improvement in [employer] COOP score      Goals Met  Goals Met: Identified Support system;Practicing stress management skills      Psychosocial Follow-up  Follow-up/Discharge: Unable to assess pt's stress goals as she did not attend rehab today.  Will assess when she returns.             Patient involved in Goal setting?: No      Signature: _____________________________________________________________    Date: __________________    Time: __________________

## 2021-06-22 NOTE — PROGRESS NOTES
INR 1.3 After talking with pt and discussing history of greens/salads and medication change. Pt will  continue  with current diet and dosing of Warfarin.  Continue with moderation of Vit K and green leafy vegetables. Cautioned to call with increase bruising or bleeding. Reminded to call with medication change especially antibiotic. Call with any questions or concerns or any up coming procedures. Cautioned about using Herbal medication.  Increase dose to 4 mg Sun tue and thur and 6 mg all other days.

## 2021-06-22 NOTE — PROGRESS NOTES
ITP ASSESSMENT   Assessment Day: 60 Day    Session Number: 11  Precautions: Surgical, balance concerns from B ft drop    Diagnosis: MI;CAB    Risk Stratification: High    Referring Provider: Wegener, Joel D, MD  EXERCISE  Exercise Assessment: Reassessment       6 Minute Walk Test   Pre   Pre Exercise HR: 68                    Pre Exercise BP: 134/78      Peak  Peak HR: 75                   Peak BP: 152/84      Peak O2 SAT: 97    Peak RPE: 14      Symptoms:  Peak Symptoms: leg fatigue      5 mins. Post  5 Min Post HR: 64    5 Min Post BP: 130/66                           Exercise Plan  Goals Next 30 days  ADL'S: Tolerate home activities with less fatigue.    Leisure: Patient will join [employer].    Work: disabled      Education Goals: Has system for taking medication.;Patient can state cardiac s/s and appropriate emergency response.    Education Goals Met: Medication review.;Patient can state cardiac s/s and appropriate emergency response.;Has system for taking medication.                          Goals Met  30 day ADL'S goals met: Patient continues to get fatigued doing home activites. Patient reports less fatigue.    30 day Leisure goals met: Goal met. Patient is marching/leg exercises 3-4x/week.    30 day Work goals met: Disabled    30 Day Progression: Pt is exercising at a MET level of 2.5 on the [employer] for a total exercise time of 50 minutes.      Initial ADL's goals met: Tolerates doing home exercise with less fatigue, still reports getting fatigue and needing to stop and take breaks, will keep as same goal    Initial Leisure goals met: Tolerates marching program at home    Intial Work goals met: Disabled    Initial Progression: Reached 1.8-2.2 MET level.      Exercise Prescription  Exercise Mode: Bike;Nustep;Hallway Walking    Frequency: 2x/week    Duration: 40-45 minutes    Intensity / THR: 20-30 beats above resting heart rate    RPE 11-14  Progression / Met level: 2.5-3.0    Resistive Training?:  "No      Current Exercise (mins/week): 180      Interventions  Home Exercise:  Mode: walking, marching, leg exercises    Frequency: 3-4x/week    Duration: 30-45 minutes      Education Material : Provide written material;Individual education and counseling      Education Completed  Exercise Education Completed: Signs and Symptoms;Medication review;RPE;Emergency Plan;Home Exercise;Warm up/cool down;FITT Principles              Exercise Follow-up/Discharge  Follow up/Discharge: Patient is walking at stores for 45 minutes 2-3x/week and continues to do leg exercises and marching 3-4x/week..   NUTRITION  Nutrition Assessment: Reassessment      Nutrition Risk Factors:  Nutrition Risk Factors: Diabetes;Dyslipidemia;Overweight  Monitors blood sugar at home: Yes  Frequency: 3x/day      Nutrition Plan  Interventions  Diet Consult: Completed    Other Nutrition Intervention: Diet Class;Therapist/Pt Discussion;Educational Videos;Provide with Written Material      Education Completed  Nutrition Education Completed: Low Saturated fat diet;Low sodium diet;Carbohydrate counting      Goals  Nutrition Goals (Next 30 days): Patient will follow a low sodium diet;Patient able to demonstrate carbohydrate counting;Patient will follow a low saturated fat diet;Patient knows appropriate portion size      Goals Met  Nutrition Goals Met: Patient follows a low sodium diet;Patient able to demonstrate carbohydrate counting;Patient states following a low saturated fat diet;Patient knows appropriate portion size      Height, Weight, and  BMI  Weight: 169 lb 3.2 oz (76.7 kg)  Height: 5' 2\" (1.575 m)  BMI: 30.94      Nutrition Follow-up  Follow-up/Discharge: Pt reports following a heart healthy diet the majority of the time. Patient eats primarily chicken and turkey and watches carbs and sodium.         Other Risk Factors  Other Risk Factor Assessment: Reassessment      HTN Risk Factor: Hypertension      Pre Exercise BP: 152/80  Post Exercise BP: (!) " 200/100 (192/100, 182/92, 162/82 pt feels better-sent home)      Hypertension Plan  Goals  HTN Goals: Patient demonstrates understanding of HTN, no goals identified for the next 30 days      Goals Met  HTN Goals Met: Follow low sodium diet;Take medication as prescribed;Exercises regularly      HTN Interventions  HTN Interventions: Diet consult;Therapist/patient discussion;Provide written material      HTN Education Completed  HTN Education Completed: Medication review;Risk factor overview;Low sodium diet      Tobacco Risk Factor: NA            Risk Factor Follow-up   Follow-up/Discharge: Patient reports watching sodium and avoiding processed foods and frozen foods.     PSYCHOSOCIAL  Psychosocial Assessment: Reassessment      Psychosocial Risk Factor: Stress      Psychosocial Plan  Interventions  Interventions: Provide written material;Individual education and counseling      Education Completed  Education Completed: Relaxation/Coping Techniques      Goals  Goals (Next 30 days): Improvement in [employer] COOP score      Goals Met  Goals Met: Identified Support system;Practicing stress management skills      Psychosocial Follow-up  Follow-up/Discharge: Patient reports handling stress well. Pt enjoys listening to music.             Patient involved in Goal setting?: Yes      Signature: _____________________________________________________________    Date: __________________    Time: __________________

## 2021-06-22 NOTE — TELEPHONE ENCOUNTER
"----- Message from Karen Chandra sent at 1/3/2019  1:21 PM CST -----  Contact: Patient   General phone call:    Caller: Patient  Primary cardiologist: Alex  Detailed reason for call: Patient asked to speak with Tegan directly re her \"INR stuff\"  New or active symptoms? No  Best phone number:   Best time to contact: anytime  Ok to leave a detailed message? Yes  Device? No     Additional Info:   "

## 2021-06-22 NOTE — PROGRESS NOTES
INR 1.7 increase dose to 4 mg Tue, thur and Sat and 6 mg all other days. Retest in one week. After talking with pt and discussing history of greens/salads and medication change. Pt will  continue  with current diet and dosing of Warfarin.  Continue with moderation of Vit K and green leafy vegetables. Cautioned to call with increase bruising or bleeding. Reminded to call with medication change especially antibiotic. Call with any questions or concerns or any up coming procedures. Cautioned about using Herbal medication.

## 2021-06-22 NOTE — PROGRESS NOTES
Office Visit - New Patient   Christina Sanchez   53 y.o.  female    Date of visit: 12/18/2018  Physician: Deneen Escobedo CNP     Assessment and Plan   1. Encounter to establish care  2. Visit for screening mammogram  - Mammo Screening Bilateral; Future  - DXA Bone Density Scan; Future    3. Essential hypertension  Patient is currently taking amlodipine, losartan and clonidine.  Reporting adverse effect from clonidine.  Which include foot drop and fatigue.  We will reevaluate medications with [facility] pharmacy and consult patient's cardiologist.  - Thyroid Stimulating Hormone (TSH)    4. Screen for colon cancer  - Ambulatory referral for Colonoscopy    5. Coronary artery disease involving native heart with unstable angina pectoris, unspecified vessel or lesion type (H)  No symptoms of angina, no chest pain and patient is currently on cardiac rehab.  Sternum incision is intact without any sign of infection.   - Comprehensive Metabolic Panel  - Ambulatory referral to Medication Management  - HM2(CBC w/o Differential)    6. Type 2 diabetes mellitus with complication, without long-term current use of insulin (H)  A1C at 6.3. Controlled diabetes.   - Glycosylated Hemoglobin A1c  - Comprehensive Metabolic Panel  - Ambulatory referral to Medication Management  - Thyroid Stimulating Hormone (TSH)    The following high BMI interventions were performed this visit: encouragement to exercise and dietary management education, guidance, and counseling    No Follow-up on file.     Chief Complaint   Chief Complaint   Patient presents with     [facility] Care        Patient Profile   Social History     Social History Narrative     Not on file        Past Medical History   Patient Active Problem List   Diagnosis     CHF (congestive heart failure) (H)     Acute on chronic heart failure (H)     Acute respiratory failure with hypoxia (H)     Hypertensive emergency     Viral gastroenteritis     Type 2 diabetes mellitus with complication,  without long-term current use of insulin (H)     Noncompliance with medication regimen     Non-ST elevation myocardial infarction (NSTEMI) (H)     Troponin level elevated     Acute systolic congestive heart failure (H)     NSTEMI (non-ST elevated myocardial infarction) (H)     Bacteremia     Acute cystitis without hematuria     Anemia, unspecified type     Coronary artery disease involving native heart with unstable angina pectoris, unspecified vessel or lesion type (H)     Essential hypertension     S/P CABG x 3     Left arm numbness     Bilateral embolic cerebral infarction (H)     History of CVA (cerebrovascular accident)     Atrial fibrillation (H) [I48.91]       Past Surgical History  She has a past surgical history that includes Cholecystectomy; CORONARY ARTERY BYPASS GRAFT X 3 ENDOSCOPIC VEIN HARVEST, INTERNAL MAMMARY ARTERY, ANESTHESIA TRANSESOPHAGEAL ECHOCARDIOGRAM (N/A, 10/8/2018); Coronary Angiogram (N/A, 10/2/2018); and Left Heart Catheterization with Left Ventriculogram (N/A, 10/2/2018).     History of Present Illness   Christina Sanchez is a 53 year old woman with a PMH including but not limited to HTN, diabetes, CAD, CHF, bilateral foot drop who presented to the clinic to establish care and to follow up with her blood pressure management. Patient recently underwent CABG x3 on 10/8 by Dr. Whitney. Her recent echo showed. Recent EF was 64%. Blood pressure during cardiac rehab was within normal range.  Patient reports various symptoms that she believes is from her blood pressure medications. She reports that this has been ongoing since she started taking clonidine which was the same symptom she had when she was on atenolol and metoprolol. Patient also reports that her home blood sugar was less than 150 in the morning but she was surprise that her A1C was 9.1 at the hospital. She reports falling at her bathroom while holding her her walker due to foot drop. She denied pain and discomfort but wanted her chest  incision checked to avoid separation. She denied chest pain, shortness of breath, lightheadedness and syncope.     Review of Systems: A comprehensive review of systems was negative except as noted.     Medications and Allergies   Current Outpatient Medications   Medication Sig Dispense Refill     acetaminophen (TYLENOL) 325 MG tablet Take 2 tablets (650 mg total) by mouth every 4 (four) hours as needed for pain or fever.  0     amLODIPine (NORVASC) 10 MG tablet Take 1 tablet (10 mg total) by mouth daily. 90 tablet 1     aspirin 81 mg chewable tablet Chew 1 tablet (81 mg total) daily. 30 tablet 3     cloNIDine (CATAPRES-TTS-1) 0.1 mg/24 hr Place 1 patch on the skin every 7 days. 12 patch 3     liraglutide (VICTOZA) 0.6 mg/0.1 mL (18 mg/3 mL) PnIj injection Inject 0.6 mg under the skin daily.       losartan (COZAAR) 100 MG tablet Take 1 tablet (100 mg total) by mouth daily. 90 tablet 1     nitroglycerin (NITROSTAT) 0.4 MG SL tablet Place 1 tablet (0.4 mg total) under the tongue every 5 (five) minutes as needed for chest pain. 1 Bottle 0     warfarin (COUMADIN/JANTOVEN) 4 MG tablet Take 4mg Sun, tue and thur and 6 mg all other days.. 45 tablet 0     aspirin-acetaminophen-caffeine (EXCEDRIN MIGRAINE) 250-250-65 mg per tablet Take 1-2 tablets by mouth every 6 (six) hours as needed for pain.       ezetimibe (ZETIA) 10 mg tablet Take 1 tablet (10 mg total) by mouth daily. 90 tablet 3     furosemide (LASIX) 20 MG tablet Take 1 tablet (20 mg total) by mouth 2 (two) times a day at 9am and 6pm for 4 days. 8 tablet 0     magnesium hydroxide (MILK OF MAG) 400 mg/5 mL Susp suspension Take 30 mL by mouth daily as needed.  0     omeprazole (PRILOSEC) 20 MG capsule Take 1 capsule (20 mg total) by mouth daily before breakfast. 14 capsule 0     polyethylene glycol (MIRALAX) 17 gram packet Take 1 packet (17 g total) by mouth daily as needed.  0     sitaGLIPtin (JANUVIA) 50 MG tablet Take 1 tablet (50 mg total) by mouth daily. 30 tablet  "0     No current facility-administered medications for this visit.      Allergies   Allergen Reactions     Shrimp Anaphylaxis     Atenolol Other (See Comments)     Lost muscle, ability to walk     Lisinopril Cough     Mold      Nuts - Unspecified      Peanut Headache     Stomachache        Family and Social History   Family History   Problem Relation Age of Onset     Heart disease Mother      Hyperlipidemia Mother      Hypertension Mother      Heart disease Father      Hyperlipidemia Father      Hypertension Father         Social History     Tobacco Use     Smoking status: Never Smoker     Smokeless tobacco: Never Used   Substance Use Topics     Alcohol use: No     Drug use: No        Physical Exam   General Appearance:   Well groomed     BP (!) 158/92   Pulse 74   Temp 98.2  F (36.8  C)   Ht 5' 2\" (1.575 m)   Wt 165 lb (74.8 kg)   SpO2 99%   BMI 30.18 kg/m      EYES: Eyelids, conjunctiva, and sclera were normal. Pupils were normal. Cornea, iris, and lens were normal bilaterally.  HEAD, EARS, NOSE, MOUTH, AND THROAT: Head and face were normal. Hearing was normal to voice and the ears were normal to external exam. Nose appearance was normal and there was no discharge. Oropharynx was normal.  NECK: Neck appearance was normal. There were no neck masses and the thyroid was not enlarged.  RESPIRATORY: Breathing pattern was normal and the chest moved symmetrically.  Percussion/auscultatory percussion was normal.  Lung sounds were normal and there were no abnormal sounds.  CARDIOVASCULAR: Heart rate and rhythm were normal.  S1 and S2 were normal and there were no extra sounds or murmurs. Peripheral pulses in arms and legs were normal.  Jugular venous pressure was normal.  There was no peripheral edema. Chest incision looks normal.  MUSCULOSKELETAL: Skeletal configuration was normal and muscle mass was normal for age. Joint appearance was overall normal. Use the support of a walker  LYMPHATIC: There were no enlarged " nodes.  SKIN/HAIR/NAILS: Skin color was normal.  There were no skin lesions.  Hair and nails were normal.  NEUROLOGIC: The patient was alert and oriented to person, place, time, and circumstance. Speech was normal. .  PSYCHIATRIC:  Mood and affect were normal and the patient had normal recent and remote memory. The patient's judgment and insight were normal.       Additional Information       Time: total time spent with the patient was 45 minutes of which >50% was spent in counseling and coordination of care     Deneen Escobedo DNP  Family Nurse Practitioner  [facility]  [phone]  [email or web address]

## 2021-06-22 NOTE — PROGRESS NOTES
INR 1.5 increase dose to 4mg tue and thur and 6 mg all other days. Retest in 1 weeks. After talking with pt and discussing history of greens/salads and medication change. Pt will  continue  with current diet and dosing of Warfarin.  Continue with moderation of Vit K and green leafy vegetables. Cautioned to call with increase bruising or bleeding. Reminded to call with medication change especially antibiotic. Call with any questions or concerns or any up coming procedures. Cautioned about using Herbal medication.

## 2021-06-23 NOTE — TELEPHONE ENCOUNTER
----- Message from YUE Abdi sent at 1/15/2019  4:10 PM CST -----  REBEKAH, Pt was here for her 14th session of outpt cardiac rehab today. Pt was hypertensive with exercise today. Resting BP was 136/89, HR 82. Exercise /100,  after 21 min exercise, pt was asymptomatic. Stopped exercise, after 6 min rest BP was 192/84, after another 5 min rest BP was still 192/84. Pt continues to be asymptomatic.  States she did take her medications Amlodipine 10 mg and Losartan 100 mg about 10:00 am today. .  /88, HR 76.  Pt asymptomatic, was sent home.  Pt instructed in emergency plan if BP remains high and symptoms develop.

## 2021-06-23 NOTE — PROGRESS NOTES
Pt called 2/5/19 and stated she was going to stop cardiac rehab indefinitely.  Pt will be d/c and can restart if she so chooses.  Pt had attended 14 sessions and had been exercising regularly at home.  See 90-day update as on 1/31 for more detailed info on pt's rehab.    Dominik Falcon  Cardiac Rehab Therapist

## 2021-06-23 NOTE — PATIENT INSTRUCTIONS - HE
INR 1.3 take 8 mg daily. After talking with pt and discussing history of greens/salads and medication change. Pt will  continue  with current diet and dosing of Warfarin.  Continue with moderation of Vit K and green leafy vegetables. Cautioned to call with increase bruising or bleeding. Reminded to call with medication change especially antibiotic. Call with any questions or concerns or any up coming procedures. Cautioned about using Herbal medication.

## 2021-06-23 NOTE — PATIENT INSTRUCTIONS - HE
Take 8 mg daily and hold for procedure. Restart after procedure and retest on 2/25.  Call with concerns. [phone]

## 2021-06-23 NOTE — TELEPHONE ENCOUNTER
FYI - Status Update  Who is Calling: Patient  Update: Patient spoke with heart care and they stated it's okay to run the Hepatic profile and lipid profile with blood drawn provided on 1/23/18 at clinic. Please update accordingly.  Okay to leave a detailed message?:  No return call needed

## 2021-06-23 NOTE — PROGRESS NOTES
INR 2.4 pt is feeling very poorly. Ha, fuzzy headed fatigue and weakness. She is having a biopsy 2/113 so will hold for several days and then see if symptoms get better. She will also be seeing neuro to eval need for Warfarin.

## 2021-06-23 NOTE — TELEPHONE ENCOUNTER
----- Message from Carey Johnson MD sent at 1/24/2019 12:54 PM CST -----  LDL is quite high.  Would advocate if patient willing a trial of Repatha 140 mg twice daily.  If patient is willing, let us see if we facilitate a preauthorization on her behalf.  Thanks.

## 2021-06-23 NOTE — TELEPHONE ENCOUNTER
Left detailed message with my direct number for patient to return the call to review results and recommendations.

## 2021-06-23 NOTE — PROGRESS NOTES
INR 1.6 continue 8 mg one more week. After talking with pt and discussing history of greens/salads and medication change. Pt will  continue  with current diet and dosing of Warfarin.  Continue with moderation of Vit K and green leafy vegetables. Cautioned to call with increase bruising or bleeding. Reminded to call with medication change especially antibiotic. Call with any questions or concerns or any up coming procedures. Cautioned about using Herbal medication.

## 2021-06-23 NOTE — PROGRESS NOTES
INR 1.3 will increase 6 mg daily. After talking with pt and discussing history of greens/salads and medication change. Pt will  continue  with current diet and dosing of Warfarin.  Continue with moderation of Vit K and green leafy vegetables. Cautioned to call with increase bruising or bleeding. Reminded to call with medication change especially antibiotic. Call with any questions or concerns or any up coming procedures. Cautioned about using Herbal medication.

## 2021-06-23 NOTE — TELEPHONE ENCOUNTER
Left detailed message with my direct number for patient to return the call to review recommendations.

## 2021-06-23 NOTE — PROGRESS NOTES
INR 1.3 increase Warfarin to 8 mg daily and retest on 1/28. After talking with pt and discussing history of greens/salads and medication change. Pt will  continue  with current diet and dosing of Warfarin.  Continue with moderation of Vit K and green leafy vegetables. Cautioned to call with increase bruising or bleeding. Reminded to call with medication change especially antibiotic. Call with any questions or concerns or any up coming procedures. Cautioned about using Herbal medication.

## 2021-06-23 NOTE — PROGRESS NOTES
ITP ASSESSMENT   Assessment Day: 90 Day    Session Number: 14  Precautions: Surgical, balance concerns from B ft drop    Diagnosis: MI;CAB    Risk Stratification: High    Referring Provider: Dr. Whitney  ITP sent to Dr. Chin  EXERCISE  Exercise Assessment: Reassessment                              Exercise Plan  Goals Next 30 days  ADL'S: Tolerate home activities with less fatigue.    Leisure: Patient will join [employer].    Work: disabled      Education Goals: Has system for taking medication.;Patient can state cardiac s/s and appropriate emergency response.    Education Goals Met: Medication review.;Patient can state cardiac s/s and appropriate emergency response.;Has system for taking medication.                          Goals Met  60 Day Progression: Will assess pt goals when she returns to rehab.      30 day ADL'S goals met: Patient continues to get fatigued doing home activites. Patient reports less fatigue.    30 day Leisure goals met: Goal met. Patient is marching/leg exercises 3-4x/week.    30 day Work goals met: Disabled    30 Day Progression: Pt is exercising at a MET level of 2.5 on the Nustep for a total exercise time of 50 minutes.      Initial ADL's goals met: Tolerates doing home exercise with less fatigue, still reports getting fatigue and needing to stop and take breaks, will keep as same goal    Initial Leisure goals met: Tolerates marching program at home    Intial Work goals met: Disabled    Initial Progression: Reached 1.8-2.2 MET level.      Exercise Prescription  Exercise Mode: Bike;Nustep;Hallway Walking    Frequency: 2x/week    Duration: 40-45 minutes    Intensity / THR: 20-30 beats above resting heart rate    RPE 11-14  Progression / Met level: 2.5-3.0    Resistive Training?: No      Current Exercise (mins/week): 180      Interventions  Home Exercise:  Mode: walking, marching, leg exercises    Frequency: 3-4x/week    Duration: 30-45 minutes      Education Material : Provide written  "material;Individual education and counseling      Education Completed  Exercise Education Completed: Signs and Symptoms;Medication review;RPE;Emergency Plan;Home Exercise;Warm up/cool down;FITT Principles              Exercise Follow-up/Discharge  Follow up/Discharge: Will assess pt's exercise goals when she returns to rehab.   NUTRITION  Nutrition Assessment: Reassessment      Nutrition Risk Factors:  Nutrition Risk Factors: Diabetes;Dyslipidemia;Overweight  Monitors blood sugar at home: Yes  Frequency: 3x/day      Nutrition Plan  Interventions  Diet Consult: Completed    Other Nutrition Intervention: Diet Class;Therapist/Pt Discussion;Educational Videos;Provide with Written Material      Education Completed  Nutrition Education Completed: Low Saturated fat diet;Low sodium diet;Carbohydrate counting      Goals  Nutrition Goals (Next 30 days): Patient will follow a low sodium diet;Patient able to demonstrate carbohydrate counting;Patient will follow a low saturated fat diet;Patient knows appropriate portion size      Goals Met  Nutrition Goals Met: Patient follows a low sodium diet;Patient able to demonstrate carbohydrate counting;Patient states following a low saturated fat diet;Patient knows appropriate portion size      Height, Weight, and  BMI  Weight: 171 lb 3.2 oz (77.7 kg)  Height: 5' 2\" (1.575 m)  BMI: 31.31      Nutrition Follow-up  Follow-up/Discharge: Will assess pt's diet goals         Other Risk Factors  Other Risk Factor Assessment: Reassessment      HTN Risk Factor: Hypertension      Pre Exercise BP: 136/89  Post Exercise BP: 192/84 (186/88 after another 5 min rest)      Hypertension Plan  Goals  HTN Goals: Patient demonstrates understanding of HTN, no goals identified for the next 30 days      Goals Met  HTN Goals Met: Follow low sodium diet;Take medication as prescribed;Exercises regularly      HTN Interventions  HTN Interventions: Diet consult;Therapist/patient discussion;Provide written " material      HTN Education Completed  HTN Education Completed: Medication review;Risk factor overview;Low sodium diet      Tobacco Risk Factor: NA        Risk Factor Follow-up   Follow-up/Discharge: Will assess pt's HTN goals when she returns to rehab.     PSYCHOSOCIAL  Psychosocial Assessment: Reassessment         Psychosocial Risk Factor: Stress      Psychosocial Plan  Interventions  Interventions: Provide written material;Individual education and counseling      Education Completed  Education Completed: Relaxation/Coping Techniques      Goals  Goals (Next 30 days): Improvement in [employer] CO[employer] score      Goals Met  Goals Met: Identified Support system;Practicing stress management skills      Psychosocial Follow-up  Follow-up/Discharge: Will assess pt's stress goals when she returns from rehab.             Patient involved in Goal setting?: No      Signature: _____________________________________________________________    Date: __________________    Time: __________________

## 2021-06-23 NOTE — TELEPHONE ENCOUNTER
Result Notes for Lipid Profile     Notes recorded by Radha Morgan RN on 1/30/2019 at 10:05 AM CST  Results viewed by patient 1/24/19 on [employer].   Please see ongoing telephone encounter.  ------    Notes recorded by Radha Morgan RN on 1/30/2019 at 10:01 AM CST  Result letter sent via [employer].  ------    Notes recorded by Radha Morgan RN on 1/28/2019 at 3:36 PM CST  Left detailed message with my direct number for return call to discuss results.

## 2021-06-24 NOTE — TELEPHONE ENCOUNTER
Left detailed message sating Dr. Johnson is out of the clinic this week and my direct number for return call to discuss medication concerns.

## 2021-06-26 NOTE — PROGRESS NOTES
Progress Notes by Carey Johnson MD at 12/6/2018 11:30 AM     Author: Carey Johnson MD Service: -- Author Type: Physician    Filed: 12/6/2018  1:27 PM Encounter Date: 12/6/2018 Status: Signed    : Carey Johnson MD (Physician)                  [email or web address]  [phone]         Thank you Dr. Wegener for asking the [facility] team to participate in the care of your patient, Christina Sanchez.     Impression and Plan     1.  Coronary artery disease.  She a lot has known coronary artery disease.  Specifically, Christina underwent three-vessel coronary artery bypass graft surgery 8 October 2018 with JAMI graft to the LAD, saphenous vein graft to the obtuse marginal, and saphenous vein graft to the right PDA.    This is stable since her surgical revascularization.  She has been participating in cardiac rehab without incident.    2.  Hypertension.  Blood pressure is mildly elevated in the office today.  As noted below, she has experienced inordinate fatigue on beta-blocker therapy and on her own volition has tapered her metoprolol to 25 mg.  She still has some subjective fatigue on the therapy.  Plan:    Discontinue metoprolol.    We will add clonidine patch 0.1 mg weekly.    3.  Dyslipidemia.  Lipid profile to October 2018 revealed  mg/dL and HDL 31 mg/dL.  Patient has been intolerant of myriad statin formulations including simvastatin, pravastatin, atorvastatin, and rosuvastatin.  Plan:    Trial of ezetimibe.    Repeat lipid profile in 3 months.    4.  Cerebrovascular disease.  Patient noted evidence of CVA and was started on warfarin therapy.    Plan on follow-up in in 3 months.           History of Present Illness    Once again I would like to thank you again for asking me to participate in the care of your patient, Christina Sanchez.  As you know, but to reiterate for my own records, Christina Sanchez is a 53 y.o. female with known coronary artery disease.   Specifically, Christina underwent three-vessel coronary artery bypass graft surgery 8 October 2018 with JAMI graft to the LAD, saphenous vein graft to the obtuse marginal, and saphenous vein graft to the right PDA.    In follow-up today, Christina states that she has been doing well from a cardiovascular standpoint.  She has been participating in cardiac rehab without incident.  She denies chest pain or shortness of breath.  She reports no subjective palpitations or lightheadedness.  She does, however, report that she feels quite fatigued on the beta-blocker therapy and on her own volition had scaled this back to metoprolol 25 mg daily.  She still, however, is feeling somewhat fatigued on the therapy despite lowering of dose.    Further review of systems is otherwise negative/noncontributory (medical record and 13 point review of systems reviewed as well and pertinent positives noted).         Cardiac Diagnostics      Echocardiogram 9 October 2018 (personally reviewed):  1. Limited echocardiogram.  2. Normal left ventricular size and systolic performance with a visually estimated ejection fraction of 65-70%.   3. There is mild concentric increase in left ventricular wall thickness.   4. No significant valvular heart disease is identified on this study.   5. On selected views the right ventricle appears enlarged with reduced function though very difficult to quantify due to poor acoustic imaging.     Coronary angiogram 2 October 2018 (pre-coronary artery bypass surgery):  1. The anterior descending coronary artery: 60% proximal to mid stenosis.  50-80% distal LAD stenosis.  Third diagonal branch with 90% stenosis.  2. Circumflex coronary artery: 80% proximal stenosis.  3. Right coronary artery: Proximal to mid 35% stenosis.  PDA with 50% stenosis.  4. Left ventriculography with normal left ventricular systolic performance and no wall motion abnormality.    Twelve-lead ECG (personally reviewed) 1 October 2018: Sinus rhythm.   "Heart rate 128 bpm.  Cannot rule out anterior infarct.            Physical Examination       /78 (Patient Site: Left Arm, Patient Position: Sitting, Cuff Size: Adult Regular)   Pulse 100   Resp 16   Ht 5' 2\" (1.575 m)   Wt 171 lb (77.6 kg)   BMI 31.28 kg/m          Wt Readings from Last 3 Encounters:   12/06/18 171 lb (77.6 kg)   12/04/18 172 lb (78 kg)   11/29/18 175 lb 9.6 oz (79.7 kg)     The patient is alert and oriented times three. Sclerae are anicteric. Mucosal membranes are moist. Jugular venous pressure is normal. No significant adenopathy/thyromegally appreciated. Lungs are clear with good expansion. On cardiovascular exam, the patient has a regular S1 and S2. Abdomen is soft and non-tender. Extremities reveal no clubbing, cyanosis, or edema.         Family History/Social History/Risk Factors   Patient does not smoke.  Family history of hypertension.         Medications  Allergies   Current Outpatient Medications   Medication Sig Dispense Refill   ? acetaminophen (TYLENOL) 325 MG tablet Take 2 tablets (650 mg total) by mouth every 4 (four) hours as needed for pain or fever.  0   ? amLODIPine (NORVASC) 10 MG tablet Take 1 tablet (10 mg total) by mouth daily. 90 tablet 1   ? aspirin 81 mg chewable tablet Chew 1 tablet (81 mg total) daily. 30 tablet 3   ? liraglutide (VICTOZA) 0.6 mg/0.1 mL (18 mg/3 mL) PnIj injection Inject 0.6 mg under the skin daily.     ? losartan (COZAAR) 100 MG tablet Take 1 tablet (100 mg total) by mouth daily. 90 tablet 1   ? warfarin (COUMADIN) 5 MG tablet Take 5 mg daily or as directed based on INR . Adjust dose based on INR results as directed. 30 tablet 3   ? warfarin (COUMADIN/JANTOVEN) 4 MG tablet Take 6 mg M and F and 4 mg all other days.. 45 tablet 0   ? aspirin-acetaminophen-caffeine (EXCEDRIN MIGRAINE) 250-250-65 mg per tablet Take 1-2 tablets by mouth every 6 (six) hours as needed for pain.     ? cloNIDine (CATAPRES-TTS-1) 0.1 mg/24 hr Place 1 patch on the skin " every 7 days. 12 patch 3   ? ezetimibe (ZETIA) 10 mg tablet Take 1 tablet (10 mg total) by mouth daily. 90 tablet 3   ? furosemide (LASIX) 20 MG tablet Take 1 tablet (20 mg total) by mouth 2 (two) times a day at 9am and 6pm for 4 days. 8 tablet 0   ? magnesium hydroxide (MILK OF MAG) 400 mg/5 mL Susp suspension Take 30 mL by mouth daily as needed.  0   ? omeprazole (PRILOSEC) 20 MG capsule Take 1 capsule (20 mg total) by mouth daily before breakfast. 14 capsule 0   ? polyethylene glycol (MIRALAX) 17 gram packet Take 1 packet (17 g total) by mouth daily as needed.  0   ? sitaGLIPtin (JANUVIA) 50 MG tablet Take 1 tablet (50 mg total) by mouth daily. 30 tablet 0     No current facility-administered medications for this visit.       Allergies   Allergen Reactions   ? Shrimp Anaphylaxis   ? Atenolol Other (See Comments)     Lost muscle, ability to walk   ? Lisinopril Cough   ? Mold    ? Nuts - Unspecified    ? Peanut Headache     Stomachache          Lab Results   Lab Results   Component Value Date     10/18/2018    K 4.0 10/18/2018    CL 98 10/18/2018    CO2 28 10/18/2018    BUN 20 10/18/2018    CREATININE 0.98 10/18/2018    CALCIUM 8.8 10/18/2018     Lab Results   Component Value Date    WBC 9.1 10/15/2018    HGB 8.9 (L) 10/17/2018    HCT 31.3 (L) 10/15/2018    MCV 93 10/15/2018     10/17/2018     Lab Results   Component Value Date    CHOL 197 10/02/2018    TRIG 259 (H) 10/02/2018    HDL 31 (L) 10/02/2018    LDLCALC 114 10/02/2018     Lab Results   Component Value Date    INR 1.9 11/26/2018    INR 2.30 (H) 10/31/2018     Lab Results   Component Value Date     (H) 10/01/2018     Lab Results   Component Value Date    CKTOTAL 44 10/15/2018    TROPONINI 0.65 (HH) 10/08/2018    TROPONINI 0.16 10/08/2018    TROPONINI 0.16 10/07/2018     Lab Results   Component Value Date    TSH 4.56 10/15/2018

## 2021-06-27 NOTE — PROGRESS NOTES
Progress Notes by Carey Johnson MD at 2/8/2019  1:50 PM     Author: Carey Johnson MD Service: -- Author Type: Physician    Filed: 2/8/2019  2:30 PM Encounter Date: 2/8/2019 Status: Signed    : Carey Johnson MD (Physician)                  [email or web address]  [phone]         Thank you Dr. Escobedo for asking the [facility] team to participate in the care of your patient, Christina Sanchez.     Impression and Plan     1.  Coronary artery disease.  She a lot has known coronary artery disease.  Specifically, Christina underwent three-vessel coronary artery bypass graft surgery 8 October 2018 with JAMI graft to the LAD, saphenous vein graft to the obtuse marginal, and saphenous vein graft to the right PDA.     This is stable since her surgical revascularization.  She has been participating in cardiac rehab without incident.  2.  Hypertension.  Blood pressure at times has been somewhat high.  It had been advised by my colleague, Dr. Yordy Valdez, to initiate amlodipine the patient was reticent to do so given some intolerances to various medications in the past.  She is willing to try low-dose after our discussion today, however.  Plan:    Trial of amlodipine 2.5 mg daily.     3.  Dyslipidemia.  Lipid profile 2 October 2018 revealed  mg/dL and HDL 31 mg/dL.  Patient has been intolerant of myriad statin formulations including simvastatin, pravastatin, atorvastatin, and rosuvastatin.  Repeat lipid profile post trial of ezetimibe revealed  mg/dL and HDL 49 mg/dL.  He had discussed pursuing a trial of a proprotein convertase subtilisin kexin 9 (PCSK9) inihibitor  such as Repatha  (evolocumab), however, with her multiple intolerances to medications she at this time would rather hold off, but may consider in the future.    Continue ezetimibe.     4.  Cerebrovascular disease.  Patient noted evidence of CVA and was started on warfarin therapy.  She is going to  "be following up with neurology in this regard.     Plan on follow-up in in 6 weeks primarily to reassess blood pressure.  .         History of Present Illness    Once again I would like to thank you again for asking me to participate in the care of your patient, Christina Sanchez.  As you know, but to reiterate for my own records, Christina Sanchez is a 53 y.o. female with known coronary artery disease.  Specifically, Christina underwent three-vessel coronary artery bypass graft surgery 8 October 2018 with JAMI graft to the LAD, saphenous vein graft to the obtuse marginal, and saphenous vein graft to the right PDA.     In follow-up today, Christina states that she feels she is having some reactions to some of her medications primarily her vitamin D.  She states that after she takes her vitamin D she feels anxious and \"jittery\".  She denies anginal chest pain.  Breathing is been comfortable.  She reports no palpitations or lightheadedness.  Continues to participate in cardiac rehab.    Further review of systems is otherwise negative/noncontributory (medical record and 13 point review of systems reviewed as well and pertinent positives noted).         Cardiac Diagnostics      Echocardiogram 17 December 2018:  1. Normal left ventricular size and systolic performance with ejection fraction of 60-65%.  2. No significant valvular heart disease.  3. Normal right ventricular size and systolic performance.  4. Mild left atrial enlargement.  Right atrium of normal dimension.  Line when compared to prior echocardiogram 9 October 2018, no significant change.     Echocardiogram 9 October 2018 (personally reviewed):  1. Limited echocardiogram.  2. Normal left ventricular size and systolic performance with a visually estimated ejection fraction of 65-70%.   3. There is mild concentric increase in left ventricular wall thickness.   4. No significant valvular heart disease is identified on this study.   5. On selected views the right ventricle " "appears enlarged with reduced function though very difficult to quantify due to poor acoustic imaging.     Coronary angiogram 2 October 2018 (pre-coronary artery bypass surgery):  1. The anterior descending coronary artery: 60% proximal to mid stenosis.  50-80% distal LAD stenosis.  Third diagonal branch with 90% stenosis.  2. Circumflex coronary artery: 80% proximal stenosis.  3. Right coronary artery: Proximal to mid 35% stenosis.  PDA with 50% stenosis.  4. Left ventriculography with normal left ventricular systolic performance and no wall motion abnormality.     Twelve-lead ECG (personally reviewed) 1 October 2018: Sinus rhythm.  Heart rate 128 bpm.  Cannot rule out anterior infarct.            Physical Examination       /84 (Patient Site: Right Arm, Patient Position: Sitting, Cuff Size: Adult Large)   Pulse 82   Resp 18   Ht 5' 2\" (1.575 m)   Wt 166 lb (75.3 kg)   BMI 30.36 kg/m          Wt Readings from Last 3 Encounters:   02/08/19 166 lb (75.3 kg)   01/15/19 171 lb 3.2 oz (77.7 kg)   01/10/19 170 lb (77.1 kg)     The patient is alert and oriented times three. Sclerae are anicteric. Mucosal membranes are moist. Jugular venous pressure is normal. No significant adenopathy/thyromegally appreciated. Lungs are clear with good expansion. On cardiovascular exam, the patient has a regular S1 and S2. Abdomen is soft and non-tender. Extremities reveal no clubbing, cyanosis, or edema.         Family History/Social History/Risk Factors   Patient does not smoke.  Family history of hypertension.         Medications  Allergies   Current Outpatient Medications   Medication Sig Dispense Refill   ? amLODIPine (NORVASC) 10 MG tablet Take 1 tablet (10 mg total) by mouth daily. 90 tablet 1   ? blood glucose test (ACCU-CHEK HEATHER PLUS TEST STRP) strips Use 1 each As Directed 4 (four) times a day. Test one time daily. Dx: E11.9 Dispense brand per patient's insurance at pharmacy discretion. 200 strip 3   ? blood glucose " test strips Use 1 each As Directed 4 (four) times a day. Dispense brand per patient's insurance at pharmacy discretion. 200 strip 3   ? blood-glucose meter Misc One meter, covered by insurance 1 each 0   ? ergocalciferol (ERGOCALCIFEROL) 50,000 unit capsule Take 1 capsule (50,000 Units total) by mouth once a week for 12 doses. 12 capsule 0   ? losartan (COZAAR) 100 MG tablet Take 1 tablet (100 mg total) by mouth daily. 90 tablet 1   ? nitroglycerin (NITROSTAT) 0.4 MG SL tablet Place 1 tablet (0.4 mg total) under the tongue every 5 (five) minutes as needed for chest pain. 1 Bottle 0   ? acetaminophen (TYLENOL) 325 MG tablet Take 2 tablets (650 mg total) by mouth every 4 (four) hours as needed for pain or fever.  0   ? amLODIPine (NORVASC) 2.5 MG tablet Take 1 tablet (2.5 mg total) by mouth daily. 30 tablet 12   ? aspirin 81 mg chewable tablet Chew 1 tablet (81 mg total) daily. 30 tablet 3   ? aspirin-acetaminophen-caffeine (EXCEDRIN MIGRAINE) 250-250-65 mg per tablet Take 1-2 tablets by mouth every 6 (six) hours as needed for pain.     ? ezetimibe (ZETIA) 10 mg tablet Take 1 tablet (10 mg total) by mouth daily. 90 tablet 3   ? hydroCHLOROthiazide (HYDRODIURIL) 12.5 MG tablet Take 1 tablet (12.5 mg total) by mouth daily. 30 tablet 11   ? liraglutide (VICTOZA) 0.6 mg/0.1 mL (18 mg/3 mL) PnIj injection Inject 0.6 mg under the skin daily.     ? magnesium hydroxide (MILK OF MAG) 400 mg/5 mL Susp suspension Take 30 mL by mouth daily as needed.  0   ? omeprazole (PRILOSEC) 20 MG capsule Take 1 capsule (20 mg total) by mouth daily before breakfast. 14 capsule 0   ? polyethylene glycol (MIRALAX) 17 gram packet Take 1 packet (17 g total) by mouth daily as needed.  0   ? sitaGLIPtin (JANUVIA) 50 MG tablet Take 1 tablet (50 mg total) by mouth daily. 30 tablet 0   ? warfarin (COUMADIN/JANTOVEN) 4 MG tablet Take 8 mg daily 180 tablet 0     No current facility-administered medications for this visit.       Allergies   Allergen  Reactions   ? Shrimp Anaphylaxis   ? Atenolol Other (See Comments)     Lost muscle, ability to walk   ? Lisinopril Cough   ? Mold    ? Nuts - Unspecified    ? Peanut Headache     Stomachache          Lab Results   Lab Results   Component Value Date     (L) 12/18/2018    K 4.0 12/18/2018     12/18/2018    CO2 22 12/18/2018    BUN 19 12/18/2018    CREATININE 0.89 12/18/2018    CALCIUM 9.5 12/18/2018     Lab Results   Component Value Date    WBC 5.9 12/18/2018    HGB 12.5 12/18/2018    HCT 36.6 12/18/2018    MCV 87 12/18/2018     12/18/2018     Lab Results   Component Value Date    CHOL 255 (H) 01/23/2019    TRIG 169 (H) 01/23/2019    HDL 49 (L) 01/23/2019    LDLCALC 172 (H) 01/23/2019     Lab Results   Component Value Date    INR 2.4 02/04/2019    INR 1.30 (H) 01/23/2019    INR 1.60 (!) 01/02/2019     Lab Results   Component Value Date     (H) 10/01/2018     Lab Results   Component Value Date    CKTOTAL 44 10/15/2018    TROPONINI 0.65 (HH) 10/08/2018    TROPONINI 0.16 10/08/2018    TROPONINI 0.16 10/07/2018     Lab Results   Component Value Date    TSH 1.51 12/18/2018

## 2021-06-27 NOTE — PROGRESS NOTES
Progress Notes by Carey Johnson MD at 3/22/2019 10:30 AM     Author: Carey Johnson MD Service: -- Author Type: Physician    Filed: 3/22/2019 10:43 AM Encounter Date: 3/22/2019 Status: Signed    : Carey Johnson MD (Physician)                  [email or web address]  [phone]         Thank you Dr. Escobedo for asking the [facility] Heart Care team to participate in the care of your patient, Christina Sanchez.     Impression and Plan     1.  Coronary artery disease.  She a lot has known coronary artery disease.  Specifically, Christina underwent three-vessel coronary artery bypass graft surgery 8 October 2018 with JAMI graft to the LAD, saphenous vein graft to the obtuse marginal, and saphenous vein graft to the right PDA.     This is stable since her surgical revascularization.     2.  Hypertension.  Blood pressure at times continues to be elevated.  Patient has had multiple intolerances to medications.  She has been tolerating the losartan, however.    Increase losartan from 50 mg daily to 100 mg daily.    3.  Dyslipidemia.  Lipid profile 2 October 2018 revealed  mg/dL and HDL 31 mg/dL.  Patient has been intolerant of myriad statin formulations including simvastatin, pravastatin, atorvastatin, and rosuvastatin.  Repeat lipid profile post trial of ezetimibe revealed  mg/dL and HDL 49 mg/dL.  He had discussed pursuing a trial of a proprotein convertase subtilisin kexin 9 (PCSK9) inihibitor  such as Repatha  (evolocumab), however, with her multiple intolerances to medications she at this time would rather defer.    Continue ezetimibe.     4.  Cerebrovascular disease.  Patient noted evidence of CVA and was started on warfarin therapy.  She is going to be following up with neurology in this regard.     Plan on follow-up in in 6 weeks primarily to reassess blood pressure.           History of Present Illness    Once again I would like to thank you again for asking me to  participate in the care of your patient, Christina Sanchez.  As you know, but to reiterate for my own records, Christina Sanchez is a 53 y.o. female withknown coronary artery disease.  Specifically, Christina underwent three-vessel coronary artery bypass graft surgery 8 October 2018 with JAMI graft to the LAD, saphenous vein graft to the obtuse marginal, and saphenous vein graft to the right PDA.     In follow-up today, Christina states that she feels she is having some reactions to some of her medications.  She reported nausea with amlodipine.  She felt poorly on hydrochlorothiazide as well.  She denies chest pain.  Breathing is comfortable.  She reports no subjective palpitations.    Further review of systems is otherwise negative/noncontributory (medical record and 13 point review of systems reviewed as well and pertinent positives noted).         Cardiac Diagnostics      Echocardiogram 17 December 2018:  1. Normal left ventricular size and systolic performance with ejection fraction of 60-65%.  2. No significant valvular heart disease.  3. Normal right ventricular size and systolic performance.  4. Mild left atrial enlargement.  Right atrium of normal dimension.    o When compared to prior echocardiogram 9 October 2018, no significant change.    Echocardiogram 9 October 2018 (personally reviewed):  1. Limited echocardiogram.  2. Normal left ventricular size and systolic performance with a visually estimated ejection fraction of 65-70%.   3. There is mild concentric increase in left ventricular wall thickness.   4. No significant valvular heart disease is identified on this study.   5. On selected views the right ventricle appears enlarged with reduced function though very difficult to quantify due to poor acoustic imaging.     Coronary angiogram 2 October 2018 (pre-coronary artery bypass surgery):  1. The anterior descending coronary artery: 60% proximal to mid stenosis.  50-80% distal LAD stenosis.  Third diagonal branch  "with 90% stenosis.  2. Circumflex coronary artery: 80% proximal stenosis.  3. Right coronary artery: Proximal to mid 35% stenosis.  PDA with 50% stenosis.  4. Left ventriculography with normal left ventricular systolic performance and no wall motion abnormality.    Twelve-lead ECG (personally reviewed) 1 October 2018: Sinus rhythm.  Heart rate 128 bpm.  Cannot rule out anterior infarct.            Physical Examination       BP (!) 195/85 (Patient Site: Right Arm, Patient Position: Sitting, Cuff Size: Adult Regular)   Pulse 76   Resp 16   Ht 5' 2\" (1.575 m)   Wt 165 lb (74.8 kg)   BMI 30.18 kg/m          Wt Readings from Last 3 Encounters:   03/22/19 165 lb (74.8 kg)   02/08/19 166 lb (75.3 kg)   01/15/19 171 lb 3.2 oz (77.7 kg)     The patient is alert and oriented times three. Sclerae are anicteric. Mucosal membranes are moist. Jugular venous pressure is normal. No significant adenopathy/thyromegally appreciated. Lungs are clear with good expansion. On cardiovascular exam, the patient has a regular S1 and S2. Abdomen is soft and non-tender. Extremities reveal no clubbing, cyanosis, or edema.       Family History/Social History/Risk Factors   Patient does not smoke.  Family history of hypertension.         Medications  Allergies   Current Outpatient Medications   Medication Sig Dispense Refill   ? acetaminophen (TYLENOL) 325 MG tablet Take 2 tablets (650 mg total) by mouth every 4 (four) hours as needed for pain or fever.  0   ? aspirin 81 mg chewable tablet Chew 1 tablet (81 mg total) daily. 30 tablet 3   ? blood glucose test (ACCU-CHEK HEATHER PLUS TEST STRP) strips Use 1 each As Directed 4 (four) times a day. Test one time daily. Dx: E11.9 Dispense brand per patient's insurance at pharmacy discretion. 200 strip 3   ? blood glucose test strips Use 1 each As Directed 4 (four) times a day. Dispense brand per patient's insurance at pharmacy discretion. 200 strip 3   ? blood-glucose meter Misc One meter, covered by " insurance 1 each 0   ? liraglutide (VICTOZA) 0.6 mg/0.1 mL (18 mg/3 mL) PnIj injection Inject 0.6 mg under the skin as needed.            ? nitroglycerin (NITROSTAT) 0.4 MG SL tablet Place 1 tablet (0.4 mg total) under the tongue every 5 (five) minutes as needed for chest pain. 1 Bottle 0   ? aspirin-acetaminophen-caffeine (EXCEDRIN MIGRAINE) 250-250-65 mg per tablet Take 1-2 tablets by mouth every 6 (six) hours as needed for pain.     ? ergocalciferol (ERGOCALCIFEROL) 50,000 unit capsule Take 1 capsule (50,000 Units total) by mouth once a week for 12 doses. 12 capsule 0   ? ezetimibe (ZETIA) 10 mg tablet Take 1 tablet (10 mg total) by mouth daily. 90 tablet 3   ? hydroCHLOROthiazide (HYDRODIURIL) 12.5 MG tablet Take 1 tablet (12.5 mg total) by mouth daily. 30 tablet 11   ? losartan (COZAAR) 100 MG tablet Take 1 tablet (100 mg total) by mouth daily. 90 tablet 3   ? magnesium hydroxide (MILK OF MAG) 400 mg/5 mL Susp suspension Take 30 mL by mouth daily as needed.  0   ? omeprazole (PRILOSEC) 20 MG capsule Take 1 capsule (20 mg total) by mouth daily before breakfast. 14 capsule 0   ? polyethylene glycol (MIRALAX) 17 gram packet Take 1 packet (17 g total) by mouth daily as needed.  0   ? sitaGLIPtin (JANUVIA) 50 MG tablet Take 1 tablet (50 mg total) by mouth daily. 30 tablet 0   ? warfarin (COUMADIN/JANTOVEN) 4 MG tablet Take 8 mg daily 180 tablet 0     No current facility-administered medications for this visit.       Allergies   Allergen Reactions   ? Amlodipine Nausea Only   ? Shrimp Anaphylaxis   ? Atenolol Other (See Comments)     Lost muscle, ability to walk   ? Lisinopril Cough   ? Mold    ? Nuts - Unspecified    ? Peanut Headache     Stomachache          Lab Results   Lab Results   Component Value Date     02/08/2019    K 4.6 02/08/2019     02/08/2019    CO2 22 02/08/2019    BUN 26 (H) 02/08/2019    CREATININE 0.92 02/08/2019    CALCIUM 9.6 02/08/2019     Lab Results   Component Value Date    WBC  5.9 12/18/2018    HGB 12.5 12/18/2018    HCT 36.6 12/18/2018    MCV 87 12/18/2018     12/18/2018     Lab Results   Component Value Date    CHOL 255 (H) 01/23/2019    TRIG 169 (H) 01/23/2019    HDL 49 (L) 01/23/2019    LDLCALC 172 (H) 01/23/2019     Lab Results   Component Value Date    INR 2.4 02/04/2019    INR 1.30 (H) 01/23/2019    INR 1.60 (!) 01/02/2019     Lab Results   Component Value Date     (H) 10/01/2018     Lab Results   Component Value Date    CKTOTAL 44 10/15/2018    TROPONINI 0.65 (HH) 10/08/2018    TROPONINI 0.16 10/08/2018    TROPONINI 0.16 10/07/2018     Lab Results   Component Value Date    TSH 1.51 12/18/2018

## 2021-06-27 NOTE — PROGRESS NOTES
"Progress Notes by Carey Johnson MD at 5/2/2019  7:50 AM     Author: Carey Johnson MD Service: -- Author Type: Physician    Filed: 5/2/2019  8:25 AM Encounter Date: 5/2/2019 Status: Signed    : Carey Johnson MD (Physician)                  [email or web address]  [phone]         Thank you Dr. Escobedo for asking the [facility] Heart Care team to participate in the care of your patient, Christina Sanchez.     Impression and Plan     1.  Coronary artery disease.  She a lot has known coronary artery disease.  Specifically, Christina underwent three-vessel coronary artery bypass graft surgery 8 October 2018 with JAMI graft to the LAD, saphenous vein graft to the obtuse marginal, and saphenous vein graft to the right PDA.     This is stable since her surgical revascularization.      2.  Hypertension.  Blood pressure at times continues to be elevated.  Patient has had multiple intolerances to medications.  She reported nausea with amlodipine.  She felt poorly on hydrochlorothiazide as well.  She reported muscle aches on atenolol.  She also did not tolerate clonidine.  He now reports that the losartan makes her feel \"jittery\".  She has had a cough on ACE inhibitors.     We will pursue a trial of minoxidil 5 mg twice daily.     3.  Dyslipidemia.  Lipid profile 2 October 2018 revealed  mg/dL and HDL 31 mg/dL.  Patient has been intolerant of myriad statin formulations including simvastatin, pravastatin, atorvastatin, and rosuvastatin.  She also did not tolerate ezetimibe. Repeat lipid profile post trial of ezetimibe revealed  mg/dL and HDL 49 mg/dL.  He had discussed pursuing a trial of a proprotein convertase subtilisin kexin 9 (PCSK9) inihibitor  such as Repatha  (evolocumab), however, with her multiple intolerances to medications she at this time would rather defer.     4.  Cerebrovascular disease.  Patient noted evidence of CVA and was started on warfarin therapy.  She is " "going to be following up with neurology in this regard.     Plan on follow-up in in 8 weeks primarily to reassess blood pressure.            History of Present Illness    Once again I would like to thank you again for asking me to participate in the care of your patient, Christina Sanchez.  As you know, but to reiterate for my own records, Christina Sanchez is a 53 y.o. female with known coronary artery disease.  Specifically, Christina underwent three-vessel coronary artery bypass graft surgery 8 October 2018 with JAMI graft to the LAD, saphenous vein graft to the obtuse marginal, and saphenous vein graft to the right PDA.     In follow-up today, Christina states that she feels she is having some reactions to some of her medications.  She reported nausea with amlodipine.  She felt poorly on hydrochlorothiazide as well.  She reported muscle aches on atenolol.  She also did not tolerate clonidine.  He now reports that the losartan makes her feel \"jittery\".  She has had a cough on ACE inhibitors.  She denies chest pain.  Breathing is comfortable.  She reports no subjective palpitations.    Further review of systems is otherwise negative/noncontributory (medical record and 13 point review of systems reviewed as well and pertinent positives noted).         Cardiac Diagnostics      Echocardiogram 17 December 2018:  1. Normal left ventricular size and systolic performance with ejection fraction of 60-65%.  2. No significant valvular heart disease.  3. Normal right ventricular size and systolic performance.  4. Mild left atrial enlargement.  Right atrium of normal dimension.      When compared to prior echocardiogram 9 October 2018, no significant change.    Echocardiogram 9 October 2018 (personally reviewed):  1. Limited echocardiogram.  2. Normal left ventricular size and systolic performance with a visually estimated ejection fraction of 65-70%.   3. There is mild concentric increase in left ventricular wall thickness.   4. No " "significant valvular heart disease is identified on this study.   5. On selected views the right ventricle appears enlarged with reduced function though very difficult to quantify due to poor acoustic imaging.     Coronary angiogram 2 October 2018 (pre-coronary artery bypass surgery):  1. The anterior descending coronary artery: 60% proximal to mid stenosis.  50-80% distal LAD stenosis.  Third diagonal branch with 90% stenosis.  2. Circumflex coronary artery: 80% proximal stenosis.  3. Right coronary artery: Proximal to mid 35% stenosis.  PDA with 50% stenosis.  4. Left ventriculography with normal left ventricular systolic performance and no wall motion abnormality.    Twelve-lead ECG (personally reviewed) 1 October 2018: Sinus rhythm.  Heart rate 128 bpm.  Cannot rule out anterior infarct.         Physical Examination       BP (!) 164/94 (Patient Site: Right Arm, Patient Position: Sitting, Cuff Size: Adult Large)   Pulse 68   Resp 16   Ht 5' 2\" (1.575 m)   Wt 159 lb (72.1 kg)   BMI 29.08 kg/m          Wt Readings from Last 3 Encounters:   05/02/19 159 lb (72.1 kg)   03/22/19 165 lb (74.8 kg)   02/08/19 166 lb (75.3 kg)       The patient is alert and oriented times three. Sclerae are anicteric. Mucosal membranes are moist. Jugular venous pressure is normal. No significant adenopathy/thyromegally appreciated. Lungs are clear with good expansion. On cardiovascular exam, the patient has a regular S1 and S2. Abdomen is soft and non-tender. Extremities reveal no clubbing, cyanosis, or edema.       Family History/Social History/Risk Factors   Patient does not smoke.  Family history of hypertension.         Medications  Allergies   Current Outpatient Medications   Medication Sig Dispense Refill   ? acetaminophen (TYLENOL) 325 MG tablet Take 2 tablets (650 mg total) by mouth every 4 (four) hours as needed for pain or fever.  0   ? aspirin 81 mg chewable tablet Chew 1 tablet (81 mg total) daily. 30 tablet 3   ? blood " glucose test (ACCU-CHEK HEATHER PLUS TEST STRP) strips Use 1 each As Directed 4 (four) times a day. Test one time daily. Dx: E11.9 Dispense brand per patient's insurance at pharmacy discretion. 200 strip 3   ? blood glucose test strips Use 1 each As Directed 4 (four) times a day. Dispense brand per patient's insurance at pharmacy discretion. 200 strip 3   ? blood-glucose meter Misc One meter, covered by insurance 1 each 0   ? nitroglycerin (NITROSTAT) 0.4 MG SL tablet Place 1 tablet (0.4 mg total) under the tongue every 5 (five) minutes as needed for chest pain. 1 Bottle 0   ? aspirin-acetaminophen-caffeine (EXCEDRIN MIGRAINE) 250-250-65 mg per tablet Take 1-2 tablets by mouth every 6 (six) hours as needed for pain.     ? hydroCHLOROthiazide (HYDRODIURIL) 12.5 MG tablet Take 1 tablet (12.5 mg total) by mouth daily. 30 tablet 11   ? liraglutide (VICTOZA) 0.6 mg/0.1 mL (18 mg/3 mL) PnIj injection Inject 0.6 mg under the skin as needed.            ? magnesium hydroxide (MILK OF MAG) 400 mg/5 mL Susp suspension Take 30 mL by mouth daily as needed.  0   ? minoxidil (LONITEN) 10 MG tablet Take 0.5 tablets (5 mg total) by mouth 2 (two) times a day. 60 tablet 0   ? omeprazole (PRILOSEC) 20 MG capsule Take 1 capsule (20 mg total) by mouth daily before breakfast. 14 capsule 0   ? polyethylene glycol (MIRALAX) 17 gram packet Take 1 packet (17 g total) by mouth daily as needed.  0   ? sitaGLIPtin (JANUVIA) 50 MG tablet Take 1 tablet (50 mg total) by mouth daily. 30 tablet 0   ? warfarin (COUMADIN/JANTOVEN) 4 MG tablet Take 8 mg daily 180 tablet 0     No current facility-administered medications for this visit.       Allergies   Allergen Reactions   ? Amlodipine Nausea Only   ? Shrimp Anaphylaxis   ? Atenolol Other (See Comments)     Lost muscle, ability to walk   ? Lisinopril Cough   ? Mold    ? Nuts - Unspecified    ? Peanut Headache     Stomachache          Lab Results   Lab Results   Component Value Date     02/08/2019     K 4.6 02/08/2019     02/08/2019    CO2 22 02/08/2019    BUN 26 (H) 02/08/2019    CREATININE 0.92 02/08/2019    CALCIUM 9.6 02/08/2019     Lab Results   Component Value Date    WBC 5.9 12/18/2018    HGB 12.5 12/18/2018    HCT 36.6 12/18/2018    MCV 87 12/18/2018     12/18/2018     Lab Results   Component Value Date    CHOL 255 (H) 01/23/2019    TRIG 169 (H) 01/23/2019    HDL 49 (L) 01/23/2019    LDLCALC 172 (H) 01/23/2019     Lab Results   Component Value Date    INR 2.4 02/04/2019    INR 1.30 (H) 01/23/2019    INR 1.60 (!) 01/02/2019     Lab Results   Component Value Date     (H) 10/01/2018     Lab Results   Component Value Date    CKTOTAL 44 10/15/2018    TROPONINI 0.65 (HH) 10/08/2018    TROPONINI 0.16 10/08/2018    TROPONINI 0.16 10/07/2018     Lab Results   Component Value Date    TSH 1.51 12/18/2018

## 2021-06-28 NOTE — PROGRESS NOTES
"Progress Notes by Carey Johnson MD at 4/9/2020  2:30 PM     Author: Carey Johnson MD Service: -- Author Type: Physician    Filed: 4/9/2020  2:52 PM Encounter Date: 4/9/2020 Status: Signed    : Carey Johnson MD (Physician)          The patient has been notified of following:     \"This telephone visit will be conducted via a call between you and your physician/provider. We have found that certain health care needs can be provided without the need for a physical exam.  This service lets us provide the care you need with a phone conversation.  If a prescription is necessary we can send it directly to your pharmacy.  If lab work is needed we can place an order for that and you can then stop by our lab to have the test done at a later time. If during the course of the call the physician/provider feels a telephone visit is not appropriate, you will not be charged for this service.\" Verbal consent has been obtained for this service by care team member:         HEART CARE PHONE ENCOUNTER     The patient has chosen to have the visit conducted as a telephone visit, to reduce risk of exposure given the current status of Coronavirus in our community. This telephone visit is being conducted via a call between the patient and physician/provider. Health care needs are being provided without a physical exam.      Impression and Plan     1.  Coronary artery disease.  She a lot has known coronary artery disease.  Specifically, Christina underwent three-vessel coronary artery bypass graft surgery 8 October 2018 with JAMI graft to the LAD, saphenous vein graft to the obtuse marginal, and saphenous vein graft to the right PDA.     This is stable since her surgical revascularization.      2.  Hypertension.  She will his blood pressure has been difficult to manage due to myriad adverse effects to multiple blood pressure formulations.  She, however, states that she is tolerating the losartan presently.  " "She feels it is improving and home blood pressure monitoring has shown more favorable readings.  Venu to continue with the current therapy.    3.  Dyslipidemia.  Lipid profile 2 October 2018 revealed  mg/dL and HDL 31 mg/dL.  Patient has been intolerant of myriad statin formulations including simvastatin, pravastatin, atorvastatin, and rosuvastatin.  She also did not tolerate ezetimibe.  We have discussed pursuing a trial of a proprotein convertase subtilisin kexin 9 (PCSK9) inihibitor  such as Repatha  (evolocumab), however, with her multiple intolerances to medications she at this time would rather continue to defer.     4.  Cerebrovascular disease/atrial fibrillation.  And to continue warfarin.     Plan on follow-up in in 8 weeks primarily to reassess blood pressure.      Total time of call between patient and provider was 12 minutes     Start Time: 1435    Stop Time: 1447         History of Present Illness    Christina Sanchez is a 54 y.o. female who is being evaluated via a billable telephone visit.     Once again I would like to thank you again for asking me to participate in the care of your patient, Christina Sanchez.  As you know, but to reiterate for my own records, Christina Sanchez is a 54 y.o. female with known coronary artery disease.  Specifically, Christina underwent three-vessel coronary artery bypass graft surgery 8 October 2018 with JAMI graft to the LAD, saphenous vein graft to the obtuse marginal, and saphenous vein graft to the right PDA.     In follow-up today, Christina reports that she felt she was having some issues with lisinopril.  Specifically, she had felt sluggish and washed out and \"out of it\".  She on her own volition asked to pursue a trial of losartan once again.  She states that this seems to be going well for her.  She at present reports no adverse effects to this therapy.  She feels her blood pressure is improved on her home blood pressure monitoring device.  She denies chest pain.  She " reports no palpitations or lightheadedness.  Denies any fevers, chills, or other constitutional symptoms.    Further review of systems is otherwise negative/noncontributory (medical record and 13 point review of systems reviewed as well and pertinent positives noted).         Cardiac Diagnostics   Echocardiogram 17 December 2018:  1. Normal left ventricular size and systolic performance with ejection fraction of 60-65%.  2. No significant valvular heart disease.  3. Normal right ventricular size and systolic performance.  4. Mild left atrial enlargement.  Right atrium of normal dimension.    o When compared to prior echocardiogram 9 October 2018, no significant change.    Echocardiogram 9 October 2018 (personally reviewed):  1. Limited echocardiogram.  2. Normal left ventricular size and systolic performance with a visually estimated ejection fraction of 65-70%.   3. There is mild concentric increase in left ventricular wall thickness.   4. No significant valvular heart disease is identified on this study.   5. On selected views the right ventricle appears enlarged with reduced function though very difficult to quantify due to poor acoustic imaging.     Coronary angiogram 2 October 2018 (pre-coronary artery bypass surgery):  1. The anterior descending coronary artery: 60% proximal to mid stenosis.  50-80% distal LAD stenosis.  Third diagonal branch with 90% stenosis.  2. Circumflex coronary artery: 80% proximal stenosis.  3. Right coronary artery: Proximal to mid 35% stenosis.  PDA with 50% stenosis.  4. Left ventriculography with normal left ventricular systolic performance and no wall motion abnormality.    Twelve-lead ECG (personally reviewed) 1 October 2018: Sinus rhythm.  Heart rate 128 bpm.  Cannot rule out anterior infarct.          Physical Examination           Wt Readings from Last 3 Encounters:   04/09/20 147 lb 8 oz (66.9 kg)   05/02/19 159 lb (72.1 kg)   03/22/19 165 lb (74.8 kg)     The patient has  chosen to have the visit conducted as a telephone visit, to reduce risk of exposure given the current status of Coronavirus in our community. This telephone visit is being conducted via a call between the patient and physician/provider. Health care needs are being provided without a physical exam.          Family History/Social History/Risk Factors   Patient does not smoke.  Family history reviewed, and family history includes Atrial fibrillation in her sister; Cardiomyopathy in her brother and brother; Coronary artery disease in her brother; Diabetes in her father; Heart disease in her father, mother, and sister; Hyperlipidemia in her father, mother, and sister; Hypertension in her mother and sister.        Medications  Allergies   Current Outpatient Medications   Medication Sig Dispense Refill   ? acetaminophen (TYLENOL) 325 MG tablet Take 2 tablets (650 mg total) by mouth every 4 (four) hours as needed for pain or fever.  0   ? amoxicillin (AMOXIL) 500 MG capsule 4 tablets po 1 hour prior to dental work 4 capsule 0   ? blood glucose test (ACCU-CHEK HEATHER PLUS TEST STRP) strips Use 1 each As Directed 4 (four) times a day. Test one time daily. Dx: E11.9 Dispense brand per patient's insurance at pharmacy discretion. 200 strip 3   ? blood glucose test strips Use 1 each As Directed 4 (four) times a day. Dx: E11.8. Dispense brand per patient's insurance at pharmacy discretion. 200 strip 3   ? blood glucose test strips Use 1 each As Directed 4 (four) times a day. Dx: E11.8. Dispense brand per patient's insurance at pharmacy discretion. 200 strip 5   ? blood-glucose meter Misc One meter, covered by insurance 1 each 0   ? blood-glucose meter Misc Use as directed. Dx: E11.8. Dispense Heather Accu Check Plus 1 each 0   ? blood-glucose meter Misc Use 1 Device As Directed 4 (four) times a day. Dx: E11.8. Dispense brand per patient's insurance at pharmacy discretion. 1 each 0   ? generic lancets Use 1 each As Directed 4 (four)  times a day. Dx: E11.8. Dispense brand per patient's insurance at pharmacy discretion. 200 each 5   ? losartan (COZAAR) 25 MG tablet Take 1 tablet (25 mg total) by mouth daily. 30 tablet 11   ? nitroglycerin (NITROSTAT) 0.4 MG SL tablet Place 1 tablet (0.4 mg total) under the tongue every 5 (five) minutes as needed for chest pain. 1 Bottle 0   ? aspirin 81 mg chewable tablet Chew 1 tablet (81 mg total) daily. 30 tablet 3   ? aspirin-acetaminophen-caffeine (EXCEDRIN MIGRAINE) 250-250-65 mg per tablet Take 1-2 tablets by mouth every 6 (six) hours as needed for pain.     ? hydroCHLOROthiazide (HYDRODIURIL) 12.5 MG tablet Take 1 tablet (12.5 mg total) by mouth daily. 30 tablet 11   ? liraglutide (VICTOZA) 0.6 mg/0.1 mL (18 mg/3 mL) PnIj injection Inject 0.6 mg under the skin daily. 9 mL 0   ? magnesium hydroxide (MILK OF MAG) 400 mg/5 mL Susp suspension Take 30 mL by mouth daily as needed.  0   ? minoxidil (LONITEN) 10 MG tablet Take 0.5 tablets (5 mg total) by mouth 2 (two) times a day. 60 tablet 0   ? omeprazole (PRILOSEC) 20 MG capsule Take 1 capsule (20 mg total) by mouth daily before breakfast. 14 capsule 0   ? polyethylene glycol (MIRALAX) 17 gram packet Take 1 packet (17 g total) by mouth daily as needed.  0   ? sitaGLIPtin (JANUVIA) 50 MG tablet Take 1 tablet (50 mg total) by mouth daily. 30 tablet 0   ? warfarin (COUMADIN/JANTOVEN) 4 MG tablet Take 8 mg daily 180 tablet 0     No current facility-administered medications for this visit.       Allergies   Allergen Reactions   ? Amlodipine Nausea Only   ? Shrimp Anaphylaxis   ? Atenolol Other (See Comments)     Lost muscle, ability to walk   ? Lisinopril Cough   ? Mold    ? Nuts - Unspecified    ? Peanut Headache     Stomachache          Lab Results   Lab Results   Component Value Date     09/05/2019    K 4.4 09/05/2019     09/05/2019    CO2 21 (L) 09/05/2019    BUN 33 (H) 09/05/2019    CREATININE 0.96 09/05/2019    CALCIUM 9.0 09/05/2019     Lab  Results   Component Value Date    WBC 5.5 09/05/2019    HGB 12.8 09/05/2019    HCT 37.4 09/05/2019    MCV 96 09/05/2019     09/05/2019     Lab Results   Component Value Date    CHOL 255 (H) 01/23/2019    TRIG 169 (H) 01/23/2019    HDL 49 (L) 01/23/2019    LDLCALC 172 (H) 01/23/2019     Lab Results   Component Value Date    INR 2.4 02/04/2019    INR 1.30 (H) 01/23/2019    INR 1.60 (!) 01/02/2019     Lab Results   Component Value Date     (H) 10/01/2018     Lab Results   Component Value Date    CKTOTAL 44 10/15/2018    TROPONINI 0.65 (HH) 10/08/2018    TROPONINI 0.16 10/08/2018    TROPONINI 0.16 10/07/2018     Lab Results   Component Value Date    TSH 1.51 12/18/2018           Medical History  Surgical History   Past Medical History:   Diagnosis Date   ? Anemia    ? Diabetes mellitus (H)    ? Hx of CABG    ? Hypertension       Past Surgical History:   Procedure Laterality Date   ? CABG     ? CHOLECYSTECTOMY     ? MAMMO STEREOTACTIC CORE BIOPSY RIGHT Right 2/14/2019   ? [facility]ATH [facility] CORONARY ART ANGIO IMG S&I N/A 10/2/2018    Procedure: Coronary Angiogram;  Surgeon: Daniel Cavazos MD;  Location: [facility] Cath Lab;  Service: Cardiology   ? [facility] L HRT CATH W/[facility]JX L VENTRICULOGRAPHY IMG S&I N/A 10/2/2018    Procedure: Left Heart Catheterization with Left Ventriculogram;  Surgeon: Daniel Cavazos MD;  Location: [facility] Cath Lab;  Service: Cardiology

## 2021-08-01 ENCOUNTER — HEALTH MAINTENANCE LETTER (OUTPATIENT)
Age: 56
End: 2021-08-01

## 2021-09-26 ENCOUNTER — HEALTH MAINTENANCE LETTER (OUTPATIENT)
Age: 56
End: 2021-09-26

## 2022-03-13 ENCOUNTER — HEALTH MAINTENANCE LETTER (OUTPATIENT)
Age: 57
End: 2022-03-13

## 2022-05-08 ENCOUNTER — HEALTH MAINTENANCE LETTER (OUTPATIENT)
Age: 57
End: 2022-05-08

## 2023-01-14 ENCOUNTER — HEALTH MAINTENANCE LETTER (OUTPATIENT)
Age: 58
End: 2023-01-14

## 2023-04-23 ENCOUNTER — HEALTH MAINTENANCE LETTER (OUTPATIENT)
Age: 58
End: 2023-04-23

## 2023-06-02 ENCOUNTER — HEALTH MAINTENANCE LETTER (OUTPATIENT)
Age: 58
End: 2023-06-02

## 2023-07-16 ENCOUNTER — HEALTH MAINTENANCE LETTER (OUTPATIENT)
Age: 58
End: 2023-07-16

## 2024-02-11 ENCOUNTER — HEALTH MAINTENANCE LETTER (OUTPATIENT)
Age: 59
End: 2024-02-11